# Patient Record
Sex: FEMALE | Race: WHITE | NOT HISPANIC OR LATINO | Employment: FULL TIME | URBAN - METROPOLITAN AREA
[De-identification: names, ages, dates, MRNs, and addresses within clinical notes are randomized per-mention and may not be internally consistent; named-entity substitution may affect disease eponyms.]

---

## 2017-06-19 ENCOUNTER — APPOINTMENT (OUTPATIENT)
Dept: LAB | Facility: CLINIC | Age: 52
End: 2017-06-19
Payer: COMMERCIAL

## 2017-06-19 ENCOUNTER — TRANSCRIBE ORDERS (OUTPATIENT)
Dept: LAB | Facility: CLINIC | Age: 52
End: 2017-06-19

## 2017-06-19 DIAGNOSIS — E03.9 UNSPECIFIED HYPOTHYROIDISM: Primary | ICD-10-CM

## 2017-06-19 DIAGNOSIS — E03.9 UNSPECIFIED HYPOTHYROIDISM: ICD-10-CM

## 2017-06-19 LAB
T4 FREE SERPL-MCNC: 0.92 NG/DL (ref 0.76–1.46)
TSH SERPL DL<=0.05 MIU/L-ACNC: 0.49 UIU/ML (ref 0.36–3.74)

## 2017-06-19 PROCEDURE — 84439 ASSAY OF FREE THYROXINE: CPT

## 2017-06-19 PROCEDURE — 84443 ASSAY THYROID STIM HORMONE: CPT

## 2017-06-19 PROCEDURE — 36415 COLL VENOUS BLD VENIPUNCTURE: CPT

## 2018-01-22 ENCOUNTER — TRANSCRIBE ORDERS (OUTPATIENT)
Dept: RADIOLOGY | Facility: CLINIC | Age: 53
End: 2018-01-22

## 2018-01-22 ENCOUNTER — APPOINTMENT (OUTPATIENT)
Dept: RADIOLOGY | Facility: CLINIC | Age: 53
End: 2018-01-22
Payer: COMMERCIAL

## 2018-01-22 ENCOUNTER — APPOINTMENT (OUTPATIENT)
Dept: LAB | Facility: CLINIC | Age: 53
End: 2018-01-22
Payer: COMMERCIAL

## 2018-01-22 DIAGNOSIS — Z00.00 ROUTINE GENERAL MEDICAL EXAMINATION AT A HEALTH CARE FACILITY: ICD-10-CM

## 2018-01-22 DIAGNOSIS — R06.9 ABNORMAL RESPIRATORY RATE: Primary | ICD-10-CM

## 2018-01-22 DIAGNOSIS — E03.9 MYXEDEMA HEART DISEASE: ICD-10-CM

## 2018-01-22 DIAGNOSIS — R06.9 ABNORMAL RESPIRATORY RATE: ICD-10-CM

## 2018-01-22 DIAGNOSIS — E78.2 MIXED HYPERLIPIDEMIA: ICD-10-CM

## 2018-01-22 DIAGNOSIS — I51.9 MYXEDEMA HEART DISEASE: ICD-10-CM

## 2018-01-22 LAB
ALBUMIN SERPL BCP-MCNC: 3.6 G/DL (ref 3.5–5)
ALP SERPL-CCNC: 88 U/L (ref 46–116)
ALT SERPL W P-5'-P-CCNC: 32 U/L (ref 12–78)
ANION GAP SERPL CALCULATED.3IONS-SCNC: 7 MMOL/L (ref 4–13)
AST SERPL W P-5'-P-CCNC: 19 U/L (ref 5–45)
BASOPHILS # BLD AUTO: 0.03 THOUSANDS/ΜL (ref 0–0.1)
BASOPHILS NFR BLD AUTO: 0 % (ref 0–1)
BILIRUB SERPL-MCNC: 0.4 MG/DL (ref 0.2–1)
BUN SERPL-MCNC: 19 MG/DL (ref 5–25)
CALCIUM SERPL-MCNC: 9.2 MG/DL (ref 8.3–10.1)
CHLORIDE SERPL-SCNC: 107 MMOL/L (ref 100–108)
CHOLEST SERPL-MCNC: 205 MG/DL (ref 50–200)
CO2 SERPL-SCNC: 29 MMOL/L (ref 21–32)
CREAT SERPL-MCNC: 0.87 MG/DL (ref 0.6–1.3)
EOSINOPHIL # BLD AUTO: 0.36 THOUSAND/ΜL (ref 0–0.61)
EOSINOPHIL NFR BLD AUTO: 5 % (ref 0–6)
ERYTHROCYTE [DISTWIDTH] IN BLOOD BY AUTOMATED COUNT: 14.1 % (ref 11.6–15.1)
GFR SERPL CREATININE-BSD FRML MDRD: 77 ML/MIN/1.73SQ M
GLUCOSE P FAST SERPL-MCNC: 95 MG/DL (ref 65–99)
HCT VFR BLD AUTO: 38.9 % (ref 34.8–46.1)
HDLC SERPL-MCNC: 69 MG/DL (ref 40–60)
HGB BLD-MCNC: 12.8 G/DL (ref 11.5–15.4)
LDLC SERPL CALC-MCNC: 127 MG/DL (ref 0–100)
LYMPHOCYTES # BLD AUTO: 1.88 THOUSANDS/ΜL (ref 0.6–4.47)
LYMPHOCYTES NFR BLD AUTO: 27 % (ref 14–44)
MCH RBC QN AUTO: 30.5 PG (ref 26.8–34.3)
MCHC RBC AUTO-ENTMCNC: 32.9 G/DL (ref 31.4–37.4)
MCV RBC AUTO: 93 FL (ref 82–98)
MONOCYTES # BLD AUTO: 0.58 THOUSAND/ΜL (ref 0.17–1.22)
MONOCYTES NFR BLD AUTO: 8 % (ref 4–12)
NEUTROPHILS # BLD AUTO: 4.07 THOUSANDS/ΜL (ref 1.85–7.62)
NEUTS SEG NFR BLD AUTO: 60 % (ref 43–75)
PLATELET # BLD AUTO: 256 THOUSANDS/UL (ref 149–390)
PMV BLD AUTO: 9.3 FL (ref 8.9–12.7)
POTASSIUM SERPL-SCNC: 4.4 MMOL/L (ref 3.5–5.3)
PROT SERPL-MCNC: 7.2 G/DL (ref 6.4–8.2)
RBC # BLD AUTO: 4.19 MILLION/UL (ref 3.81–5.12)
SODIUM SERPL-SCNC: 143 MMOL/L (ref 136–145)
T4 SERPL-MCNC: 9.3 UG/DL (ref 4.7–13.3)
TRIGL SERPL-MCNC: 45 MG/DL
TSH SERPL DL<=0.05 MIU/L-ACNC: 0.46 UIU/ML (ref 0.36–3.74)
WBC # BLD AUTO: 6.92 THOUSAND/UL (ref 4.31–10.16)

## 2018-01-22 PROCEDURE — 80061 LIPID PANEL: CPT

## 2018-01-22 PROCEDURE — 84436 ASSAY OF TOTAL THYROXINE: CPT

## 2018-01-22 PROCEDURE — 80053 COMPREHEN METABOLIC PANEL: CPT

## 2018-01-22 PROCEDURE — 85025 COMPLETE CBC W/AUTO DIFF WBC: CPT

## 2018-01-22 PROCEDURE — 71046 X-RAY EXAM CHEST 2 VIEWS: CPT

## 2018-01-22 PROCEDURE — 84443 ASSAY THYROID STIM HORMONE: CPT

## 2018-01-22 PROCEDURE — 36415 COLL VENOUS BLD VENIPUNCTURE: CPT

## 2018-02-27 ENCOUNTER — HOSPITAL ENCOUNTER (INPATIENT)
Facility: HOSPITAL | Age: 53
LOS: 4 days | Discharge: HOME | DRG: 394 | End: 2018-03-03
Attending: EMERGENCY MEDICINE | Admitting: FAMILY MEDICINE
Payer: COMMERCIAL

## 2018-02-27 ENCOUNTER — APPOINTMENT (EMERGENCY)
Dept: CT IMAGING | Facility: HOSPITAL | Age: 53
DRG: 394 | End: 2018-02-27
Payer: COMMERCIAL

## 2018-02-27 DIAGNOSIS — K52.9 COLITIS: Primary | ICD-10-CM

## 2018-02-27 DIAGNOSIS — D72.829 LEUKOCYTOSIS: ICD-10-CM

## 2018-02-27 PROBLEM — Z86.73 HISTORY OF TIAS: Status: ACTIVE | Noted: 2018-02-27

## 2018-02-27 LAB
ANION GAP SERPL CALCULATED.3IONS-SCNC: 10 MMOL/L (ref 4–13)
APTT PPP: 29 SECONDS (ref 23–35)
BASOPHILS # BLD AUTO: 0.02 THOUSANDS/ΜL (ref 0–0.1)
BASOPHILS NFR BLD AUTO: 0 % (ref 0–1)
BUN SERPL-MCNC: 22 MG/DL (ref 5–25)
CALCIUM SERPL-MCNC: 9 MG/DL (ref 8.3–10.1)
CHLORIDE SERPL-SCNC: 107 MMOL/L (ref 100–108)
CO2 SERPL-SCNC: 26 MMOL/L (ref 21–32)
CREAT SERPL-MCNC: 0.85 MG/DL (ref 0.6–1.3)
EOSINOPHIL # BLD AUTO: 0.07 THOUSAND/ΜL (ref 0–0.61)
EOSINOPHIL NFR BLD AUTO: 1 % (ref 0–6)
ERYTHROCYTE [DISTWIDTH] IN BLOOD BY AUTOMATED COUNT: 14.4 % (ref 11.6–15.1)
GFR SERPL CREATININE-BSD FRML MDRD: 79 ML/MIN/1.73SQ M
GLUCOSE SERPL-MCNC: 110 MG/DL (ref 65–140)
HCT VFR BLD AUTO: 40.6 % (ref 34.8–46.1)
HGB BLD-MCNC: 13.4 G/DL (ref 11.5–15.4)
INR PPP: 0.84 (ref 0.86–1.16)
LYMPHOCYTES # BLD AUTO: 1.32 THOUSANDS/ΜL (ref 0.6–4.47)
LYMPHOCYTES NFR BLD AUTO: 10 % (ref 14–44)
MCH RBC QN AUTO: 30.5 PG (ref 26.8–34.3)
MCHC RBC AUTO-ENTMCNC: 33 G/DL (ref 31.4–37.4)
MCV RBC AUTO: 92 FL (ref 82–98)
MONOCYTES # BLD AUTO: 0.56 THOUSAND/ΜL (ref 0.17–1.22)
MONOCYTES NFR BLD AUTO: 4 % (ref 4–12)
NEUTROPHILS # BLD AUTO: 11.95 THOUSANDS/ΜL (ref 1.85–7.62)
NEUTS SEG NFR BLD AUTO: 85 % (ref 43–75)
PLATELET # BLD AUTO: 281 THOUSANDS/UL (ref 149–390)
PMV BLD AUTO: 9.3 FL (ref 8.9–12.7)
POTASSIUM SERPL-SCNC: 4.3 MMOL/L (ref 3.5–5.3)
PROTHROMBIN TIME: 11.8 SECONDS (ref 12.1–14.4)
RBC # BLD AUTO: 4.4 MILLION/UL (ref 3.81–5.12)
SODIUM SERPL-SCNC: 143 MMOL/L (ref 136–145)
WBC # BLD AUTO: 13.92 THOUSAND/UL (ref 4.31–10.16)

## 2018-02-27 PROCEDURE — 99222 1ST HOSP IP/OBS MODERATE 55: CPT | Performed by: PHYSICIAN ASSISTANT

## 2018-02-27 PROCEDURE — 85610 PROTHROMBIN TIME: CPT | Performed by: EMERGENCY MEDICINE

## 2018-02-27 PROCEDURE — 96360 HYDRATION IV INFUSION INIT: CPT

## 2018-02-27 PROCEDURE — 36415 COLL VENOUS BLD VENIPUNCTURE: CPT | Performed by: EMERGENCY MEDICINE

## 2018-02-27 PROCEDURE — 80048 BASIC METABOLIC PNL TOTAL CA: CPT | Performed by: EMERGENCY MEDICINE

## 2018-02-27 PROCEDURE — 99285 EMERGENCY DEPT VISIT HI MDM: CPT

## 2018-02-27 PROCEDURE — 85730 THROMBOPLASTIN TIME PARTIAL: CPT | Performed by: EMERGENCY MEDICINE

## 2018-02-27 PROCEDURE — 74177 CT ABD & PELVIS W/CONTRAST: CPT

## 2018-02-27 PROCEDURE — 85025 COMPLETE CBC W/AUTO DIFF WBC: CPT | Performed by: EMERGENCY MEDICINE

## 2018-02-27 RX ORDER — MORPHINE SULFATE 4 MG/ML
4 INJECTION, SOLUTION INTRAMUSCULAR; INTRAVENOUS EVERY 4 HOURS PRN
Status: DISCONTINUED | OUTPATIENT
Start: 2018-02-27 | End: 2018-03-03 | Stop reason: HOSPADM

## 2018-02-27 RX ORDER — TRAMADOL HYDROCHLORIDE 50 MG/1
50 TABLET ORAL EVERY 6 HOURS PRN
Status: DISCONTINUED | OUTPATIENT
Start: 2018-02-27 | End: 2018-03-03 | Stop reason: HOSPADM

## 2018-02-27 RX ORDER — ASPIRIN 81 MG/1
81 TABLET, CHEWABLE ORAL DAILY
Status: DISCONTINUED | OUTPATIENT
Start: 2018-02-28 | End: 2018-03-03 | Stop reason: HOSPADM

## 2018-02-27 RX ORDER — ACETAMINOPHEN 325 MG/1
650 TABLET ORAL EVERY 6 HOURS PRN
Status: DISCONTINUED | OUTPATIENT
Start: 2018-02-27 | End: 2018-03-03 | Stop reason: HOSPADM

## 2018-02-27 RX ORDER — MAGNESIUM HYDROXIDE/ALUMINUM HYDROXICE/SIMETHICONE 120; 1200; 1200 MG/30ML; MG/30ML; MG/30ML
30 SUSPENSION ORAL EVERY 6 HOURS PRN
Status: DISCONTINUED | OUTPATIENT
Start: 2018-02-27 | End: 2018-03-03 | Stop reason: HOSPADM

## 2018-02-27 RX ORDER — ONDANSETRON 2 MG/ML
4 INJECTION INTRAMUSCULAR; INTRAVENOUS EVERY 6 HOURS PRN
Status: DISCONTINUED | OUTPATIENT
Start: 2018-02-27 | End: 2018-03-03 | Stop reason: HOSPADM

## 2018-02-27 RX ORDER — SODIUM CHLORIDE 9 MG/ML
100 INJECTION, SOLUTION INTRAVENOUS CONTINUOUS
Status: DISCONTINUED | OUTPATIENT
Start: 2018-02-27 | End: 2018-03-03 | Stop reason: HOSPADM

## 2018-02-27 RX ORDER — CIPROFLOXACIN 2 MG/ML
400 INJECTION, SOLUTION INTRAVENOUS EVERY 12 HOURS
Status: DISCONTINUED | OUTPATIENT
Start: 2018-02-27 | End: 2018-03-03 | Stop reason: HOSPADM

## 2018-02-27 RX ADMIN — METRONIDAZOLE 500 MG: 500 INJECTION, SOLUTION INTRAVENOUS at 22:08

## 2018-02-27 RX ADMIN — IOHEXOL 100 ML: 350 INJECTION, SOLUTION INTRAVENOUS at 19:06

## 2018-02-27 RX ADMIN — SODIUM CHLORIDE 1000 ML: 0.9 INJECTION, SOLUTION INTRAVENOUS at 18:03

## 2018-02-27 RX ADMIN — SODIUM CHLORIDE 100 ML/HR: 0.9 INJECTION, SOLUTION INTRAVENOUS at 22:08

## 2018-02-27 RX ADMIN — CIPROFLOXACIN 400 MG: 2 INJECTION, SOLUTION INTRAVENOUS at 22:45

## 2018-02-27 NOTE — ED PROVIDER NOTES
History  Chief Complaint   Patient presents with    Rectal Bleeding     Pt c/o "bright red rectal bleeding starting around 1300 today" Pt has hx of hemmrhoids, states "It's not that, I think it might be my diverticulitis"      Patient presents to the emergency department for evaluation of lower abdominal pain which began earlier today with multiple episodes of rectal bleeding  Patient has a history of diverticulitis and colitis and she states this feels like that  She denies urinary symptoms  She denies upper abdominal pain  She denies back pain  She denies fever chills nausea vomiting diarrhea  Denies trauma fall injury or new activity  There are no ill contacts at home  She denies recent antibiotic use  None       Past Medical History:   Diagnosis Date    Diverticulitis        Past Surgical History:   Procedure Laterality Date    CHOLECYSTECTOMY      COLONOSCOPY         History reviewed  No pertinent family history  I have reviewed and agree with the history as documented  Social History   Substance Use Topics    Smoking status: Former Smoker    Smokeless tobacco: Not on file    Alcohol use No        Review of Systems   Constitutional: Negative  Negative for activity change, appetite change, chills, diaphoresis, fatigue and fever  HENT: Negative  Eyes: Negative  Negative for photophobia and visual disturbance  Respiratory: Negative  Negative for cough, choking, chest tightness and shortness of breath  Cardiovascular: Negative  Negative for chest pain, palpitations and leg swelling  Gastrointestinal: Positive for abdominal pain, anal bleeding and blood in stool  Negative for constipation, diarrhea, nausea, rectal pain and vomiting  Endocrine: Negative  Genitourinary: Negative  Musculoskeletal: Negative  Negative for back pain, neck pain and neck stiffness  Skin: Negative  Negative for rash and wound  Allergic/Immunologic: Negative      Neurological: Negative  Negative for dizziness, tremors, seizures, syncope, facial asymmetry, speech difficulty, weakness, light-headedness, numbness and headaches  Hematological: Negative  Does not bruise/bleed easily  Psychiatric/Behavioral: Negative  Physical Exam  ED Triage Vitals [02/27/18 1733]   Temperature Pulse Respirations Blood Pressure SpO2   98 °F (36 7 °C) 105 16 145/76 98 %      Temp Source Heart Rate Source Patient Position - Orthostatic VS BP Location FiO2 (%)   Oral Monitor Lying Right arm --      Pain Score       8           Orthostatic Vital Signs  Vitals:    02/27/18 1733 02/27/18 1745 02/27/18 1956   BP: 145/76 145/76 137/68   Pulse: 105 92 94   Patient Position - Orthostatic VS: Lying  Lying       Physical Exam   Constitutional: She is oriented to person, place, and time  She appears well-developed and well-nourished  Nontoxic appearance without respiratory distress  Patient looks comfortable sitting upright on the stretcher  HENT:   Head: Normocephalic and atraumatic  Right Ear: External ear normal    Left Ear: External ear normal    Mouth/Throat: Oropharynx is clear and moist    Eyes: Conjunctivae and EOM are normal  Pupils are equal, round, and reactive to light  Neck: Normal range of motion  Neck supple  Cardiovascular: Normal rate, regular rhythm, normal heart sounds and intact distal pulses  Pulmonary/Chest: Effort normal and breath sounds normal  No respiratory distress  She has no wheezes  She has no rales  She exhibits no tenderness  Abdominal: Soft  Bowel sounds are normal  She exhibits no distension and no mass  There is tenderness  There is no rebound and no guarding  No hernia  Mild lower abdominal tenderness to palpation  No peritoneal signs   Musculoskeletal: Normal range of motion  She exhibits no edema, tenderness or deformity  Neurological: She is alert and oriented to person, place, and time  She has normal reflexes  She displays normal reflexes   No cranial nerve deficit or sensory deficit  She exhibits normal muscle tone  Coordination normal    Skin: Skin is warm and dry  Psychiatric: She has a normal mood and affect  Her behavior is normal  Judgment and thought content normal    Nursing note and vitals reviewed  ED Medications  Medications   sodium chloride 0 9 % bolus 1,000 mL (0 mL Intravenous Stopped 2/27/18 1914)   iohexol (OMNIPAQUE) 350 MG/ML injection (MULTI-DOSE) 100 mL (100 mL Intravenous Given 2/27/18 1906)       Diagnostic Studies  Results Reviewed     Procedure Component Value Units Date/Time    Protime-INR [[id number]]  (Abnormal) Collected:  02/27/18 1802    Lab Status:  Final result Specimen:  Blood from Arm, Left Updated:  02/27/18 1825     Protime 11 8 (L) seconds      INR 0 84 (L)    APTT [[id number]]  (Normal) Collected:  02/27/18 1802    Lab Status:  Final result Specimen:  Blood from Arm, Left Updated:  02/27/18 1825     PTT 29 seconds     Narrative: Therapeutic Heparin Range = 60-90 seconds    Basic metabolic panel [[id number]] Collected:  02/27/18 1802    Lab Status:  Final result Specimen:  Blood from Arm, Left Updated:  02/27/18 1823     Sodium 143 mmol/L      Potassium 4 3 mmol/L      Chloride 107 mmol/L      CO2 26 mmol/L      Anion Gap 10 mmol/L      BUN 22 mg/dL      Creatinine 0 85 mg/dL      Glucose 110 mg/dL      Calcium 9 0 mg/dL      eGFR 79 ml/min/1 73sq m     Narrative:         National Kidney Disease Education Program recommendations are as follows:  GFR calculation is accurate only with a steady state creatinine  Chronic Kidney disease less than 60 ml/min/1 73 sq  meters  Kidney failure less than 15 ml/min/1 73 sq  meters      CBC and differential [[id number]]  (Abnormal) Collected:  02/27/18 1802    Lab Status:  Final result Specimen:  Blood from Arm, Left Updated:  02/27/18 1808     WBC 13 92 (H) Thousand/uL      RBC 4 40 Million/uL      Hemoglobin 13 4 g/dL      Hematocrit 40 6 %      MCV 92 fL      MCH 30 5 pg MCHC 33 0 g/dL      RDW 14 4 %      MPV 9 3 fL      Platelets 396 Thousands/uL      Neutrophils Relative 85 (H) %      Lymphocytes Relative 10 (L) %      Monocytes Relative 4 %      Eosinophils Relative 1 %      Basophils Relative 0 %      Neutrophils Absolute 11 95 (H) Thousands/µL      Lymphocytes Absolute 1 32 Thousands/µL      Monocytes Absolute 0 56 Thousand/µL      Eosinophils Absolute 0 07 Thousand/µL      Basophils Absolute 0 02 Thousands/µL                  CT abdomen pelvis with contrast   Final Result by Yusef Shin MD (02/27 1927)      Colitis of the descending colon  Workstation performed: [id number]                    Procedures  Procedures       Phone Contacts  ED Phone Contact    ED Course  ED Course as of Feb 27 2020   Tue Feb 27, 2018 1959 Patient will be admitted to the hospitalist service  Her CT is consistent with descending colitis  Patient is refusing analgesics at this time  MDM  CritCare Time    Disposition  Final diagnoses:   Colitis   Leukocytosis     Time reflects when diagnosis was documented in both MDM as applicable and the Disposition within this note     Time User Action Codes Description Comment    2/27/2018  8:00 PM Vena Corona Add [K52 9] Colitis     2/27/2018  8:00 PM Tom Cross 57 [D72 829] Leukocytosis       ED Disposition     ED Disposition Condition Comment    Admit  Case was discussed with Dr Yaya Toribio  and the patient's admission status was agreed to be Admission Status: inpatient status to the service of Dr Emanuel Rodriguez    None       Patient's Medications    No medications on file     No discharge procedures on file      ED Provider  Electronically Signed by           Jermain Orellana MD  02/27/18 8346

## 2018-02-28 LAB
ANION GAP SERPL CALCULATED.3IONS-SCNC: 8 MMOL/L (ref 4–13)
BUN SERPL-MCNC: 13 MG/DL (ref 5–25)
CALCIUM SERPL-MCNC: 8.5 MG/DL (ref 8.3–10.1)
CHLORIDE SERPL-SCNC: 106 MMOL/L (ref 100–108)
CO2 SERPL-SCNC: 25 MMOL/L (ref 21–32)
CREAT SERPL-MCNC: 0.73 MG/DL (ref 0.6–1.3)
ERYTHROCYTE [DISTWIDTH] IN BLOOD BY AUTOMATED COUNT: 14.3 % (ref 11.6–15.1)
GFR SERPL CREATININE-BSD FRML MDRD: 95 ML/MIN/1.73SQ M
GLUCOSE SERPL-MCNC: 98 MG/DL (ref 65–140)
HCT VFR BLD AUTO: 36.4 % (ref 34.8–46.1)
HGB BLD-MCNC: 11.9 G/DL (ref 11.5–15.4)
LACTATE SERPL-SCNC: 0.4 MMOL/L (ref 0.5–2)
MCH RBC QN AUTO: 30.2 PG (ref 26.8–34.3)
MCHC RBC AUTO-ENTMCNC: 32.7 G/DL (ref 31.4–37.4)
MCV RBC AUTO: 92 FL (ref 82–98)
PLATELET # BLD AUTO: 248 THOUSANDS/UL (ref 149–390)
PMV BLD AUTO: 9.7 FL (ref 8.9–12.7)
POTASSIUM SERPL-SCNC: 3.5 MMOL/L (ref 3.5–5.3)
RBC # BLD AUTO: 3.94 MILLION/UL (ref 3.81–5.12)
SODIUM SERPL-SCNC: 139 MMOL/L (ref 136–145)
WBC # BLD AUTO: 10.92 THOUSAND/UL (ref 4.31–10.16)

## 2018-02-28 PROCEDURE — 80048 BASIC METABOLIC PNL TOTAL CA: CPT | Performed by: PHYSICIAN ASSISTANT

## 2018-02-28 PROCEDURE — 83605 ASSAY OF LACTIC ACID: CPT | Performed by: PHYSICIAN ASSISTANT

## 2018-02-28 PROCEDURE — 85027 COMPLETE CBC AUTOMATED: CPT | Performed by: PHYSICIAN ASSISTANT

## 2018-02-28 PROCEDURE — 99232 SBSQ HOSP IP/OBS MODERATE 35: CPT | Performed by: HOSPITALIST

## 2018-02-28 PROCEDURE — 99254 IP/OBS CNSLTJ NEW/EST MOD 60: CPT | Performed by: INTERNAL MEDICINE

## 2018-02-28 RX ADMIN — CIPROFLOXACIN 400 MG: 2 INJECTION, SOLUTION INTRAVENOUS at 09:16

## 2018-02-28 RX ADMIN — TRAMADOL HYDROCHLORIDE 50 MG: 50 TABLET, FILM COATED ORAL at 05:19

## 2018-02-28 RX ADMIN — METRONIDAZOLE 500 MG: 500 INJECTION, SOLUTION INTRAVENOUS at 22:15

## 2018-02-28 RX ADMIN — CIPROFLOXACIN 400 MG: 2 INJECTION, SOLUTION INTRAVENOUS at 21:00

## 2018-02-28 RX ADMIN — SODIUM CHLORIDE 100 ML/HR: 0.9 INJECTION, SOLUTION INTRAVENOUS at 11:40

## 2018-02-28 RX ADMIN — TRAMADOL HYDROCHLORIDE 50 MG: 50 TABLET, FILM COATED ORAL at 11:24

## 2018-02-28 RX ADMIN — METRONIDAZOLE 500 MG: 500 INJECTION, SOLUTION INTRAVENOUS at 14:37

## 2018-02-28 RX ADMIN — METRONIDAZOLE 500 MG: 500 INJECTION, SOLUTION INTRAVENOUS at 05:19

## 2018-02-28 RX ADMIN — ASPIRIN 81 MG 81 MG: 81 TABLET ORAL at 09:15

## 2018-02-28 NOTE — CONSULTS
Consultation - [facility]  Erum Shoemaker 47 y o  female MRN: [id number]  Unit/Bed#: -01 Encounter: [id number]    Inpatient consult to gastroenterology  Consult performed by: Corey Sosa ordered by: Beltran Sal        Reason for Consult / Principal Problem: Colitis    ASSESSMENT and PLAN:    Active Problems:    Colitis    History of TIAs    Descending Colitis with abdominal pain and bright red bleeding  -Likely ischemic, less likely infectious v diverticular  -History of ischemic colitis, confirmed by biopsy in 2014  Negative historical workup with doppler U/S, and negative workup for clotting disorders   -Last colonoscopy 3/2015 with sigmoid diverticulosis, poor prep, and tubular adenoma in the cecum    Plan:   -Continue supportive care, pain management per primary  -Continue to monitor H/H qdaily  -Will advance to clear liquid diet at this time  -Will r/o infectious etiology with stool studies  -If does not improve during admission would consider colonoscopy, if improvement would recommend outpatient colonoscopy for evaluation of resolution and because she is due secondary to polyp history  -------------------------------------------------------------------------------------------------------------------  HPI:   Alicia Peters is a 46 y/o female with a PMH of diverticulitis and remote history of TIAs who presents with lower abdominal pain  The symptoms started abruptly after lunch 2/27 with severe abdominal pain in the LLQ  She then had a formed brown bm and following started with loose bms with blood, she has since been passing bloody mucus every ~30 minutes  Her pain persists in the LLQ and is constant  She did develop fever/chills and nausea but did not have vomiting  She denies any new medications (only on ASA 81mg), eating any strange food, no recent travel  No sick contacts  She did have the flu about 3 weeks ago   Typically at baseline she is constipated going every 2-3 days  She has a history of ischemic colitis and questionable history of diverticulitis  Her last episode was in 2015  She was seen in the past by Dr Kayleigh Hart  She had a colonoscopy 3/2015 with a poor prep and diverticulosis in the sigmoid colon as well as a polyp in the cecum that was a tubular adenoma with repeat colonoscopy recommended in 2016  She also had a colonoscopy 11/2014 with biopsies consistent with severe ischemic colitis  She had an EGD 3/2015 was unremarkable with biopsies showing mild chronic gastritis  She had a ultrasound with doppler in 2014/ 11/2015 with patent vasculature  She has had prior negative workup with clotting disorders such as factor V leiden  CT A/P this admission shows descending colitis without abscess and scattered sigmoid diverticula  her hemoglobin 11 9 2/28 13 4 2/27  Lactic acid unremarkable  She presented with mild leukocytosis at 13  She was started on cipro and flagyl  REVIEW OF SYSTEMS:  CONSTITUTIONAL: + fever/chill  Denies rigors  Good appetite, and no recent weight loss  HEENT: No earache or tinnitus  Denies hearing loss or visual disturbances  CARDIOVASCULAR: No chest pain or palpitations  RESPIRATORY: Denies any cough, hemoptysis, shortness of breath or dyspnea on exertion  GASTROINTESTINAL: As noted in the History of Present Illness  GENITOURINARY: No problems with urination  Denies any hematuria or dysuria  NEUROLOGIC: No dizziness or vertigo, denies headaches  MUSCULOSKELETAL: Denies any muscle or joint pain  SKIN: Denies skin rashes or itching  ENDOCRINE: Denies excessive thirst  Denies intolerance to heat or cold  PSYCHOSOCIAL: Denies depression or anxiety  Denies any recent memory loss       Historical Information   Past Medical History:   Diagnosis Date    Diverticulitis      Past Surgical History:   Procedure Laterality Date    CHOLECYSTECTOMY      COLONOSCOPY       Social History   History   Alcohol Use No History   Drug Use No     History   Smoking Status    Former Smoker   Smokeless Tobacco    Not on file     History reviewed  No pertinent family history  Meds/Allergies     Prescriptions Prior to Admission   Medication    ASPIRIN 81 PO     Current Facility-Administered Medications   Medication Dose Route Frequency    acetaminophen (TYLENOL) tablet 650 mg  650 mg Oral Q6H PRN    aluminum-magnesium hydroxide-simethicone (MYLANTA) 200-200-20 mg/5 mL oral suspension 30 mL  30 mL Oral Q6H PRN    aspirin chewable tablet 81 mg  81 mg Oral Daily    ciprofloxacin (CIPRO) IVPB (premix) 400 mg  400 mg Intravenous Q12H    metroNIDAZOLE (FLAGYL) IVPB (premix) 500 mg  500 mg Intravenous Q8H    morphine (PF) 4 mg/mL injection 4 mg  4 mg Intravenous Q4H PRN    ondansetron (ZOFRAN) injection 4 mg  4 mg Intravenous Q6H PRN    sodium chloride 0 9 % infusion  100 mL/hr Intravenous Continuous    traMADol (ULTRAM) tablet 50 mg  50 mg Oral Q6H PRN     No Known Allergies    Objective     Blood pressure 113/63, pulse 95, temperature 98 6 °F (37 °C), temperature source Oral, resp  rate 16, weight 93 3 kg (205 lb 11 oz), SpO2 92 %  Intake/Output Summary (Last 24 hours) at 02/28/18 0820  Last data filed at 02/27/18 1914   Gross per 24 hour   Intake             1000 ml   Output                0 ml   Net             1000 ml     PHYSICAL EXAM:    General Appearance:   Alert, cooperative, no distress, appears stated age    HEENT:   Normocephalic, atraumatic, anicteric, no oropharyngeal thrush present      Neck:  Supple, symmetrical, trachea midline, no adenopathy;    thyroid: no enlargement/tenderness/nodules; no carotid  bruit or JVD    Lungs:   Clear to auscultation bilaterally; no rales, rhonchi or wheezing; respirations unlabored    Heart[de-identified]   S1 and S2 normal; regular rate and rhythm; no murmur, rub, or gallop     Abdomen:   Soft, non-distended; normal bowel sounds; Tenderness in the LLQ without rigidity or peritoneal signs  No masses, no organomegaly    Genitalia:   Deferred    Rectal:   Deferred    Extremities:  No cyanosis, clubbing or edema    Pulses:  2+ and symmetric all extremities    Skin:  Skin color, texture, turgor normal, no rashes or lesions    Lymph nodes:  No palpable cervical, axillary or inguinal lymphadenopathy        Lab Results:     Results from last 7 days  Lab Units 02/28/18  0708 02/27/18  1802   WBC Thousand/uL 10 92* 13 92*   HEMOGLOBIN g/dL 11 9 13 4   HEMATOCRIT % 36 4 40 6   PLATELETS Thousands/uL 248 281   NEUTROS PCT %  --  85*   LYMPHS PCT %  --  10*   MONOS PCT %  --  4   EOS PCT %  --  1       Results from last 7 days  Lab Units 02/27/18  1802   SODIUM mmol/L 143   POTASSIUM mmol/L 4 3   CHLORIDE mmol/L 107   CO2 mmol/L 26   BUN mg/dL 22   CREATININE mg/dL 0 85   CALCIUM mg/dL 9 0   GLUCOSE RANDOM mg/dL 110       Results from last 7 days  Lab Units 02/27/18  1802   INR  0 84*     Imaging Studies: I have personally reviewed pertinent imaging studies  Ct Abdomen Pelvis With Contrast    Result Date: 2/27/2018  Impression: Colitis of the descending colon  Workstation performed: [id number]     Patient was seen and examined by Dr Amber Baker  All [facility] medical decisions were made by Dr Amber Baker  Thank you for allowing us to participate in the care of this present patient  We will follow-up with you closely

## 2018-02-28 NOTE — PROGRESS NOTES
Progress Note - Chayo Sharpe 1965, 47 y o  female MRN: [id number]    Unit/Bed#: -01 Encounter: [id number]    Primary Care Provider: Gerard King MD   Date and time admitted to hospital: 2018  5:27 PM      * Colitis   Assessment & Plan    CT with colitis of descending colon, white count 13 9, mild tachycardia  Other VSS  Hx of diverticulitis per review of [facility] records; ischemic colitis per pt   Cont cipro and flagyl   Analgesics as needed  Consult GI; appreciate input   Consider CTA if increased pain/rectal bleeding  History of TIAs   Assessment & Plan    Noted; cont ASA            VTE Pharmacologic Prophylaxis:   Pharmacologic: held for rectal bleeding   Mechanical VTE Prophylaxis in Place: Yes    Patient Centered Rounds: I have performed bedside rounds with nursing staff today  Discussions with Specialists or Other Care Team Provider: GI     Education and Discussions with Family / Patient: patient     Time Spent for Care: 20 minutes  More than 50% of total time spent on counseling and coordination of care as described above  Current Length of Stay: 1 day(s)    Current Patient Status: Inpatient   Certification Statement: The patient will continue to require additional inpatient hospital stay due to colitis on antibotics     Discharge Plan / Estimated Discharge Date: TBD based on clinical course    Code Status: Level 1 - Full Code    Subjective:   Pt's pain is slightly worse but not similar to prior case of ischemic colitis  No bowel movements  Objective:     Vitals:   Temp (24hrs), Av 2 °F (36 8 °C), Min:98 °F (36 7 °C), Max:98 6 °F (37 °C)    HR:  [] 95  Resp:  [16-18] 16  BP: (113-145)/(60-76) 113/63  SpO2:  [92 %-98 %] 92 %  Body mass index is 35 31 kg/m²  Input and Output Summary (last 24 hours):        Intake/Output Summary (Last 24 hours) at 18 0930  Last data filed at 18 1914   Gross per 24 hour   Intake             1000 ml   Output 0 ml   Net             1000 ml       Physical Exam:     Physical Exam   Constitutional: She is oriented to person, place, and time  She appears well-developed  No distress  Cardiovascular: Normal rate and regular rhythm  Pulmonary/Chest: Effort normal and breath sounds normal  No respiratory distress  She has no wheezes  Abdominal: Soft  Bowel sounds are normal  She exhibits no distension and no mass  There is tenderness (mild diffuse tenderness )  There is no rebound and no guarding  Musculoskeletal: She exhibits no edema  Neurological: She is alert and oriented to person, place, and time  Skin: Skin is warm and dry  She is not diaphoretic  Psychiatric: She has a normal mood and affect  Her behavior is normal    Nursing note and vitals reviewed  Additional Data:     Labs:      Results from last 7 days  Lab Units 02/28/18  0708 02/27/18  1802   WBC Thousand/uL 10 92* 13 92*   HEMOGLOBIN g/dL 11 9 13 4   HEMATOCRIT % 36 4 40 6   PLATELETS Thousands/uL 248 281   NEUTROS PCT %  --  85*   LYMPHS PCT %  --  10*   MONOS PCT %  --  4   EOS PCT %  --  1       Results from last 7 days  Lab Units 02/28/18  0708   SODIUM mmol/L 139   POTASSIUM mmol/L 3 5   CHLORIDE mmol/L 106   CO2 mmol/L 25   BUN mg/dL 13   CREATININE mg/dL 0 73   CALCIUM mg/dL 8 5   GLUCOSE RANDOM mg/dL 98       Results from last 7 days  Lab Units 02/27/18  1802   INR  0 84*       * I Have Reviewed All Lab Data Listed Above  * Additional Pertinent Lab Tests Reviewed:  All Labs Within Last 24 Hours Reviewed    Imaging:    Imaging Reports Reviewed Today Include:   Imaging Personally Reviewed by Myself Includes:      Recent Cultures (last 7 days):           Last 24 Hours Medication List:     Current Facility-Administered Medications:  acetaminophen 650 mg Oral Q6H PRN Corazon Oswaldo, PA-C    aluminum-magnesium hydroxide-simethicone 30 mL Oral Q6H PRN Corazon Oswaldo, PA-C    aspirin 81 mg Oral Daily Corazon Oswaldo, PA-C ciprofloxacin 400 mg Intravenous Q12H Paulene Juan José, PA-C Last Rate: 400 mg (02/28/18 0916)   metroNIDAZOLE 500 mg Intravenous Q8H Arpita Hunt PA-C Last Rate: 500 mg (02/28/18 0519)   morphine injection 4 mg Intravenous Q4H PRN Paulene Fairhope, PA-C    ondansetron 4 mg Intravenous Q6H PRN Paulene Fairhope, PA-C    sodium chloride 100 mL/hr Intravenous Continuous Paulene Juan José, PA-C Last Rate: 100 mL/hr (02/27/18 2208)   traMADol 50 mg Oral Q6H PRN Paulene Fairhope, PA-C         Today, Patient Was Seen By: Issac Dey MD    ** Please Note: This note has been constructed using a voice recognition system   **

## 2018-02-28 NOTE — PLAN OF CARE
DISCHARGE PLANNING     Discharge to home or other facility with appropriate resources Progressing        PAIN - ADULT     Verbalizes/displays adequate comfort level or baseline comfort level Progressing        SAFETY ADULT     Patient will remain free of falls Progressing

## 2018-02-28 NOTE — ASSESSMENT & PLAN NOTE
CT with colitis of descending colon, white count 13 9, mild tachycardia  Other VSS  Hx of diverticulitis per review of [facility] records;   H/o ischemic colitis; seen by Dr Ban Farrar in the past   Cont cipro and flagyl   Analgesics as needed  Consult GI; appreciate input   Consider CTA if increased pain/rectal bleeding

## 2018-02-28 NOTE — ASSESSMENT & PLAN NOTE
1  CT with colitis of descending colon, white count 13 9, mild tachycardia  Other VSS  2  Hx of diverticulitis per review of [facility] records, ischemic colitis per pt although I could not find mention of this in records  3  Will start Cipro and Flagyl, monitor CBC  IVF  4  Analgesics as needed  Hold off on steroids until GI evaluates  5  Consult GI  6  Rectal bleeding is minimal, if any increase in hematochezia/chagne in vitals, will monitor H/H closely  Hgb stable at 13 5   7  Consider CTA if increased pain/rectal bleeding

## 2018-02-28 NOTE — ED NOTES
SLIM at the bedside     Nicole Epps, [facility]  02/27/18 8935

## 2018-02-28 NOTE — PLAN OF CARE
Problem: DISCHARGE PLANNING - CARE MANAGEMENT  Goal: Discharge to post-acute care or home with appropriate resources  INTERVENTIONS:  - Conduct assessment to determine patient/family and health care team treatment goals, and need for post-acute services based on payer coverage, community resources, and patient preferences, and barriers to discharge  - Address psychosocial, clinical, and financial barriers to discharge as identified in assessment in conjunction with the patient/family and health care team  - Arrange appropriate level of post-acute services according to patients   needs and preference and payer coverage in collaboration with the physician and health care team  - Communicate with and update the patient/family, physician, and health care team regarding progress on the discharge plan  - Arrange appropriate transportation to post-acute venues  Outcome: Progressing  Per nursing rounds this AM, Patient was admitted for Colitis  Patient is currently r/o c-dif  Patient still having pain, blood in stool, and most likely will not be ready for another 48hrs  CM department will continue to follow pt through discharge

## 2018-02-28 NOTE — SOCIAL WORK
Per nursing rounds this AM, Patient was admitted for Colitis  Patient is currently r/o c-dif  Patient still having pain, blood in stool, and most likely will not be ready for another 48hrs  CM department will continue to follow pt through discharge

## 2018-02-28 NOTE — PLAN OF CARE
Problem: PAIN - ADULT  Goal: Verbalizes/displays adequate comfort level or baseline comfort level  Interventions:  - Encourage patient to monitor pain and request assistance  - Assess pain using appropriate pain scale  - Administer analgesics based on type and severity of pain and evaluate response  - Implement non-pharmacological measures as appropriate and evaluate response  - Consider cultural and social influences on pain and pain management  - Notify physician/advanced practitioner if interventions unsuccessful or patient reports new pain   Outcome: Progressing      Problem: SAFETY ADULT  Goal: Patient will remain free of falls  INTERVENTIONS:  - Assess patient frequently for physical needs  -  Identify cognitive and physical deficits and behaviors that affect risk of falls    -  [place] fall precautions as indicated by assessment   - Educate patient/family on patient safety including physical limitations  - Instruct patient to call for assistance with activity based on assessment  - Modify environment to reduce risk of injury  - Consider OT/PT consult to assist with strengthening/mobility   Outcome: Progressing      Problem: DISCHARGE PLANNING  Goal: Discharge to home or other facility with appropriate resources  INTERVENTIONS:  - Identify barriers to discharge w/patient and caregiver  - Arrange for needed discharge resources and transportation as appropriate  - Identify discharge learning needs (meds, wound care, etc )  - Arrange for interpretive services to assist at discharge as needed  - Refer to Case Management Department for coordinating discharge planning if the patient needs post-hospital services based on physician/advanced practitioner order or complex needs related to functional status, cognitive ability, or social support system   Outcome: Progressing

## 2018-02-28 NOTE — H&P
H&P- Quique Fernandez 1965, 47 y o  female MRN: [id number]    Unit/Bed#: TAYLER Encounter: [id number]    Primary Care Provider: Adebayo Boogie MD   Date and time admitted to hospital: 2/27/2018  5:27 PM    Colitis   Assessment & Plan    1  CT with colitis of descending colon, white count 13 9, mild tachycardia  Other VSS  2  Hx of diverticulitis and ischemic colitis per pt  Will start Cipro and Flagyl, monitor CBC  IVF  3  Analgesics as needed  Hold off on steroids until GI evaluates  4  Consult GI  5  Rectal bleeding is minimal, if any increase in hematochezia/chagne in vitals, will monitor H/H closely  Hgb stable at 13 5  6  Consider CTA if increased pain/rectal bleeding  VTE Prophylaxis: hold until rectal bleeding resolves  / sequential compression device   Code Status: FULL  POLST: POLST form is not discussed and not completed at this time  Discussion with family: none    Anticipated Length of Stay:  Patient will be admitted on an Inpatient basis with an anticipated length of stay of  > 2 midnights  Justification for Hospital Stay: requiring IV abx    Total Time for Visit, including Counseling / Coordination of Care: 30 minutes  Greater than 50% of this total time spent on direct patient counseling and coordination of care  Chief Complaint:   Abdominal pain    History of Present Illness:    Quique Fernandez is a 47 y o  female  With PMHx of diverticulitis who presents with lower abdominal pain  Patient states she felt well up until 12 PM this afternoon after lunch when she developed severe abdominal pain suddenly  She says the pain was in her lower abdomen in the LLQ with radiation to the suprapubic area  She describes it as constant and progressive  She says she knew to check her stool and saw that it was in fact bloody  Her first bowel movement was loose and she says they became looser and darker and more foul smelling  She says her stools now are watery and very bloody  She says she is going to the bathroom about 4-5 times per hour with bright red blood with each BM  She describes streaks of blood as well as yellow mucousy "balls" in her stool  She also admits to fever and chills that developed shortly after her abdominal pain as well as nausea without vomiting  She says she has about 3-4 episodes of colitis per year but has only been hospitalized once before  She says the colitis of her last admission was "ten times worse"  She believes she was diagnosed with ischemic colitis on her last admission, she was seen by Dr Omar Dick  She says she also has a history of TIA's that have all been worked up for which she says they gave her no reason for why they occurred, she takes baby asa daily  Denies any other past medical history or risk factors for vascular disease  Denies taking any other medications  She is currently refusing analgesics as she states last time she saw "rats on the ceiling with their guts hanging out"  She denies CP, SOB, HA, dizziness, palpitations  Review of Systems:    Review of Systems   Constitutional: Positive for appetite change, chills and fever  Negative for activity change, diaphoresis and fatigue  Respiratory: Negative  Cardiovascular: Negative  Gastrointestinal: Positive for abdominal distention, abdominal pain, blood in stool, diarrhea and nausea  Negative for constipation and vomiting  Musculoskeletal: Negative  Skin: Negative  Neurological: Negative  Hematological: Negative  Psychiatric/Behavioral: Negative  Past Medical and Surgical History:     Past Medical History:   Diagnosis Date    Diverticulitis        Past Surgical History:   Procedure Laterality Date    CHOLECYSTECTOMY      COLONOSCOPY         Meds/Allergies:    Prior to Admission medications    Medication Sig Start Date End Date Taking?  Authorizing Provider   ASPIRIN 81 PO Take 81 mg by mouth   Yes Historical Provider, MD     I have reviewed home medications with patient personally  Allergies: No Known Allergies    Social History:     Marital Status: /Civil Union   Occupation:   Patient Pre-hospital Living Situation: home  Patient Pre-hospital Level of Mobility: independent  Patient Pre-hospital Diet Restrictions: none  Substance Use History:   History   Alcohol Use No     History   Smoking Status    Former Smoker   Smokeless Tobacco    Not on file     History   Drug Use No       Family History:    non-contributory    Physical Exam:     Vitals:   Blood Pressure: 137/68 (02/27/18 1956)  Pulse: 94 (02/27/18 1956)  Temperature: 98 °F (36 7 °C) (02/27/18 1733)  Temp Source: Oral (02/27/18 1733)  Respirations: 16 (02/27/18 1956)  Weight - Scale: 93 3 kg (205 lb 11 oz) (02/27/18 1734)  SpO2: 96 % (02/27/18 1956)    Physical Exam   Constitutional: She appears well-developed and well-nourished  She appears distressed  HENT:   Head: Normocephalic and atraumatic  Cardiovascular: Normal rate, regular rhythm, normal heart sounds and intact distal pulses  Exam reveals no gallop and no friction rub  No murmur heard  Pulmonary/Chest: Effort normal and breath sounds normal  No respiratory distress  She has no wheezes  She has no rales  She exhibits no tenderness  Abdominal: Soft  Bowel sounds are normal  She exhibits no distension and no mass  There is tenderness (suprapubic tenderness with wrost tenderness over LLQ) in the suprapubic area and left lower quadrant  There is rebound and guarding  Musculoskeletal: She exhibits no edema  Neurological: She is alert  Skin: Skin is warm and dry  No rash noted  She is not diaphoretic  No erythema  No pallor  Psychiatric: She has a normal mood and affect  Her behavior is normal    Nursing note and vitals reviewed  Additional Data:     Lab Results: I have personally reviewed pertinent reports          Results from last 7 days  Lab Units 02/27/18  1802   WBC Thousand/uL 13 92* HEMOGLOBIN g/dL 13 4   HEMATOCRIT % 40 6   PLATELETS Thousands/uL 281   NEUTROS PCT % 85*   LYMPHS PCT % 10*   MONOS PCT % 4   EOS PCT % 1       Results from last 7 days  Lab Units 02/27/18  1802   SODIUM mmol/L 143   POTASSIUM mmol/L 4 3   CHLORIDE mmol/L 107   CO2 mmol/L 26   BUN mg/dL 22   CREATININE mg/dL 0 85   CALCIUM mg/dL 9 0   GLUCOSE RANDOM mg/dL 110       Results from last 7 days  Lab Units 02/27/18  1802   INR  0 84*       Imaging: I have personally reviewed pertinent reports  CT abdomen pelvis with contrast   Final Result by Jenkins Hashimoto, MD (02/27 1927)      Colitis of the descending colon  Workstation performed: [id number]             EKG, Pathology, and Other Studies Reviewed on Admission:   · EKG: none on record    Allscripts / Epic Records Reviewed: No     ** Please Note: This note has been constructed using a voice recognition system   **

## 2018-02-28 NOTE — CASE MANAGEMENT
Initial Clinical Review    Admission: Date/Time/Statement: 2/27/18 @ 2019     Orders Placed This Encounter   Procedures    Inpatient Admission (expected length of stay for this patient is greater than two midnights)     Standing Status:   Standing     Number of Occurrences:   1     Order Specific Question:   Admitting Physician     Answer:   Fawad Stanton [[id number]]     Order Specific Question:   Level of Care     Answer:   Med Surg [[id number]]     Order Specific Question:   Estimated length of stay     Answer:   More than 2 Midnights     Order Specific Question:   Certification     Answer:   I certify that inpatient services are medically necessary for this patient for a duration of greater than two midnights  See H&P and MD Progress Notes for additional information about the patient's course of treatment  ED: Date/Time/Mode of Arrival:   ED Arrival Information     Expected Arrival Acuity Means of Arrival Escorted By Service Admission Type    - 2/27/2018 17:26 Urgent Walk-In Family Member General Medicine Urgent    Arrival Complaint    Abdominal pain, bloody stool          Chief Complaint:   Chief Complaint   Patient presents with    Rectal Bleeding     Pt c/o "bright red rectal bleeding starting around 1300 today" Pt has hx of hemmrhoids, states "It's not that, I think it might be my diverticulitis"        History of Illness:  47 y o  female  With PMHx of diverticulitis who presents with lower abdominal pain  Patient states she felt well up until 12 PM this afternoon after lunch when she developed severe abdominal pain suddenly  She says the pain was in her lower abdomen in the LLQ with radiation to the suprapubic area  She describes it as constant and progressive  She says she knew to check her stool and saw that it was in fact bloody  Her first bowel movement was loose and she says they became looser and darker and more foul smelling  She says her stools now are watery and very bloody   She says she is going to the bathroom about 4-5 times per hour with bright red blood with each BM  She describes streaks of blood as well as yellow mucousy "balls" in her stool  She also admits to fever and chills that developed shortly after her abdominal pain as well as nausea without vomiting  She says she has about 3-4 episodes of colitis per year but has only been hospitalized once before  She says the colitis of her last admission was "ten times worse"  She believes she was diagnosed with ischemic colitis on her last admission, she was seen by Dr Lm Parker  She says she also has a history of TIA's that have all been worked up for which she says they gave her no reason for why they occurred, she takes baby asa daily  Denies any other past medical history or risk factors for vascular disease  Denies taking any other medications  She is currently refusing analgesics as she states last time she saw "rats on the ceiling with their guts hanging out"  She denies CP, SOB, HA, dizziness, palpitations    ED Vital Signs:   ED Triage Vitals [02/27/18 1733]   Temperature Pulse Respirations Blood Pressure SpO2   98 °F (36 7 °C) 105 16 145/76 98 %      Temp Source Heart Rate Source Patient Position - Orthostatic VS BP Location FiO2 (%)   Oral Monitor Lying Right arm --      Pain Score       8        Wt Readings from Last 1 Encounters:   02/27/18 93 3 kg (205 lb 11 oz)       Vital Signs (abnormal): maximum pulse 105  Exam  Mild lower abdominal tenderness to palpation       Abnormal Labs/Diagnostic Test Results:   INR 0 84  Wbc 13 92    Ct abdomen - Colitis of the descending colon    Labs 2/28- lactic acid 0 4  Wbc 10 92    ED Treatment:   Medication Administration from 02/27/2018 1725 to 02/27/2018 2110       Date/Time Order Dose Route Action Action by Comments     02/27/2018 1914 sodium chloride 0 9 % bolus 1,000 mL 0 mL Intravenous Stopped Virgil Matamoros RN      02/27/2018 1803 sodium chloride 0 9 % bolus 1,000 mL 1,000 mL Intravenous New Bag Tennille Valverde, RN      02/27/2018 1906 iohexol (OMNIPAQUE) 350 MG/ML injection (MULTI-DOSE) 100 mL 100 mL Intravenous Given Boubacar Britt           Past Medical/Surgical History: Active Ambulatory Problems     Diagnosis Date Noted    No Active Ambulatory Problems     Resolved Ambulatory Problems     Diagnosis Date Noted    No Resolved Ambulatory Problems     Past Medical History:   Diagnosis Date    Diverticulitis        Admitting Diagnosis: Colitis [K52 9]  Leukocytosis [D72 829]  Rectal bleeding [K62 5]    Age/Sex: 47 y o  female    Assessment/Plan:   Colitis   Assessment & Plan     1  CT with colitis of descending colon, white count 13 9, mild tachycardia  Other VSS  2  Hx of diverticulitis and ischemic colitis per pt  Will start Cipro and Flagyl, monitor CBC  IVF  3  Analgesics as needed  Hold off on steroids until GI evaluates  4  Consult GI  5  Rectal bleeding is minimal, if any increase in hematochezia/chagne in vitals, will monitor H/H closely  Hgb stable at 13 5    6  Consider CTA if increased pain/rectal bleeding              Admission Orders:  2/27/2018 2020 INPATIENT   Scheduled Meds:   Current Facility-Administered Medications:  acetaminophen 650 mg Oral Q6H PRN Jeremy Retort, PA-C    aluminum-magnesium hydroxide-simethicone 30 mL Oral Q6H PRN Jeremy Retort, PA-C    aspirin 81 mg Oral Daily Jeremy Retort, PA-FERNY    ciprofloxacin 400 mg Intravenous Q12H Jeremy Retort, PA-C Last Rate: 400 mg (02/28/18 0916)   metroNIDAZOLE 500 mg Intravenous Q8H Arpita Hunt PA-C Last Rate: 500 mg (02/28/18 0519)   morphine injection 4 mg Intravenous Q4H PRN Letty Hunt PA-C    ondansetron 4 mg Intravenous Q6H PRN Jeremy Retort, PA-FERNY    sodium chloride 100 mL/hr Intravenous Continuous Jeremy Retort, PA-C Last Rate: 100 mL/hr (02/27/18 0118)   traMADol 50 mg Oral Q6H PRN Arpita Hunt PA-C      Continuous Infusions:   sodium chloride 100 mL/hr Last Rate: 100 mL/hr (02/27/18 5058)     PRN Meds:   acetaminophen    aluminum-magnesium hydroxide-simethicone    morphine injection    ondansetron    traMADol - used x 1    OTHER ORDERS: scds  Stool clostridium difficile; enteric bacterial panel  Clears  Consult GI    Per attending on 2/28-   Colitis   Assessment & Plan     CT with colitis of descending colon, white count 13 9, mild tachycardia  Other VSS  Hx of diverticulitis per review of [facility] records; ischemic colitis per pt   Cont cipro and flagyl   Analgesics as needed  Consult GI; appreciate input   Consider CTA if increased pain/rectal bleeding  Certification Statement: The patient will continue to require additional inpatient hospital stay due to colitis on antibotics         Thank you,  [address] [facility] in the [facility] by Salo Lowe for 2017  Network Utilization Review Department  Phone: [phone]; Fax [phone]  ATTENTION: The Network Utilization Review Department is now centralized for our 7 Facilities  Make a note that we have a new phone and fax numbers for our Department  Please call with any questions or concerns to [phone] and carefully follow the prompts so that you are directed to the right person  All voicemails are confidential  Fax any determinations, approvals, denials, and requests for initial or continue stay review clinical to [phone]  Due to HIGH CALL volume, it would be easier if you could please send faxed requests to expedite your requests and in part, help us provide discharge notifications faster

## 2018-03-01 ENCOUNTER — APPOINTMENT (INPATIENT)
Dept: ULTRASOUND IMAGING | Facility: HOSPITAL | Age: 53
DRG: 394 | End: 2018-03-01
Payer: COMMERCIAL

## 2018-03-01 LAB
BASOPHILS # BLD AUTO: 0.01 THOUSANDS/ΜL (ref 0–0.1)
BASOPHILS NFR BLD AUTO: 0 % (ref 0–1)
EOSINOPHIL # BLD AUTO: 0.32 THOUSAND/ΜL (ref 0–0.61)
EOSINOPHIL NFR BLD AUTO: 3 % (ref 0–6)
ERYTHROCYTE [DISTWIDTH] IN BLOOD BY AUTOMATED COUNT: 14.5 % (ref 11.6–15.1)
HCT VFR BLD AUTO: 33 % (ref 34.8–46.1)
HGB BLD-MCNC: 10.8 G/DL (ref 11.5–15.4)
LYMPHOCYTES # BLD AUTO: 1.53 THOUSANDS/ΜL (ref 0.6–4.47)
LYMPHOCYTES NFR BLD AUTO: 15 % (ref 14–44)
MCH RBC QN AUTO: 30.6 PG (ref 26.8–34.3)
MCHC RBC AUTO-ENTMCNC: 32.7 G/DL (ref 31.4–37.4)
MCV RBC AUTO: 94 FL (ref 82–98)
MONOCYTES # BLD AUTO: 0.74 THOUSAND/ΜL (ref 0.17–1.22)
MONOCYTES NFR BLD AUTO: 7 % (ref 4–12)
NEUTROPHILS # BLD AUTO: 7.39 THOUSANDS/ΜL (ref 1.85–7.62)
NEUTS SEG NFR BLD AUTO: 75 % (ref 43–75)
PLATELET # BLD AUTO: 201 THOUSANDS/UL (ref 149–390)
PMV BLD AUTO: 9.3 FL (ref 8.9–12.7)
RBC # BLD AUTO: 3.53 MILLION/UL (ref 3.81–5.12)
WBC # BLD AUTO: 9.99 THOUSAND/UL (ref 4.31–10.16)

## 2018-03-01 PROCEDURE — 85025 COMPLETE CBC W/AUTO DIFF WBC: CPT | Performed by: PHYSICIAN ASSISTANT

## 2018-03-01 PROCEDURE — 87493 C DIFF AMPLIFIED PROBE: CPT | Performed by: PHYSICIAN ASSISTANT

## 2018-03-01 PROCEDURE — 93975 VASCULAR STUDY: CPT

## 2018-03-01 PROCEDURE — 99232 SBSQ HOSP IP/OBS MODERATE 35: CPT | Performed by: INTERNAL MEDICINE

## 2018-03-01 PROCEDURE — 87505 NFCT AGENT DETECTION GI: CPT | Performed by: PHYSICIAN ASSISTANT

## 2018-03-01 RX ADMIN — METRONIDAZOLE 500 MG: 500 INJECTION, SOLUTION INTRAVENOUS at 05:30

## 2018-03-01 RX ADMIN — METRONIDAZOLE 500 MG: 500 INJECTION, SOLUTION INTRAVENOUS at 13:20

## 2018-03-01 RX ADMIN — TRAMADOL HYDROCHLORIDE 50 MG: 50 TABLET, FILM COATED ORAL at 00:54

## 2018-03-01 RX ADMIN — TRAMADOL HYDROCHLORIDE 50 MG: 50 TABLET, FILM COATED ORAL at 13:19

## 2018-03-01 RX ADMIN — CIPROFLOXACIN 400 MG: 2 INJECTION, SOLUTION INTRAVENOUS at 09:43

## 2018-03-01 RX ADMIN — SODIUM CHLORIDE 100 ML/HR: 0.9 INJECTION, SOLUTION INTRAVENOUS at 21:14

## 2018-03-01 RX ADMIN — SODIUM CHLORIDE 100 ML/HR: 0.9 INJECTION, SOLUTION INTRAVENOUS at 11:32

## 2018-03-01 RX ADMIN — ASPIRIN 81 MG 81 MG: 81 TABLET ORAL at 08:30

## 2018-03-01 RX ADMIN — CIPROFLOXACIN 400 MG: 2 INJECTION, SOLUTION INTRAVENOUS at 21:14

## 2018-03-01 RX ADMIN — METRONIDAZOLE 500 MG: 500 INJECTION, SOLUTION INTRAVENOUS at 22:16

## 2018-03-01 NOTE — PLAN OF CARE
DISCHARGE PLANNING     Discharge to home or other facility with appropriate resources Progressing        GASTROINTESTINAL - ADULT     Minimal or absence of nausea and/or vomiting Progressing     Maintains or returns to baseline bowel function Progressing     Maintains adequate nutritional intake Progressing        PAIN - ADULT     Verbalizes/displays adequate comfort level or baseline comfort level Progressing        SAFETY ADULT     Patient will remain free of falls Progressing

## 2018-03-01 NOTE — SOCIAL WORK
Per nursing rounds this AM, patient is doing better today  No bowel movements  Resting the bowel at this time  Patient is currently on clear liquids and currently working on determining the type of colitis  Patient may d c home with PO antibiotic if colitis is infectious type  Patient currently on IV antibiotics, IV fluids, and is ambulating herself to and from bathroom  No need from case management stand point at this time, CM department will follow patient through discharge  Possible d/c 24hrs pending medical clearance

## 2018-03-01 NOTE — PLAN OF CARE
Problem: DISCHARGE PLANNING - CARE MANAGEMENT  Goal: Discharge to post-acute care or home with appropriate resources  INTERVENTIONS:  - Conduct assessment to determine patient/family and health care team treatment goals, and need for post-acute services based on payer coverage, community resources, and patient preferences, and barriers to discharge  - Address psychosocial, clinical, and financial barriers to discharge as identified in assessment in conjunction with the patient/family and health care team  - Arrange appropriate level of post-acute services according to patients   needs and preference and payer coverage in collaboration with the physician and health care team  - Communicate with and update the patient/family, physician, and health care team regarding progress on the discharge plan  - Arrange appropriate transportation to post-acute venues   Outcome: Progressing  Per nursing rounds this AM, patient is doing better today  No bowel movements  Resting the bowel at this time  Patient is currently on clear liquids and currently working on determining the type of colitis  Patient may d c home with PO antibiotic if colitis is infectious type  Patient currently on IV antibiotics, IV fluids, and is ambulating herself to and from bathroom  No need from case management stand point at this time, CM department will follow patient through discharge  Possible d/c 24hrs pending medical clearance

## 2018-03-01 NOTE — PROGRESS NOTES
Progress Note - Nuno Marquez 47 y o  female MRN: [id number]    Unit/Bed#: -01 Encounter: [id number]    Assessment and Plan:   Principal Problem:    Colitis  Active Problems:    History of TIAs    Descending Colitis with abdominal pain and bright red bleeding  -Likely ischemic, less likely infectious   -History of ischemic colitis, confirmed by biopsy in 2014  Negative historical workup with doppler U/S, and negative workup for clotting disorders   -Last colonoscopy 3/2015 with sigmoid diverticulosis, poor prep, and tubular adenoma in the cecum     Plan:   -Continue supportive care, pain management per primary  -Continue IV hydration  -Trial clear liquid diet  -check stool studies if has a bm  -Continue antibiotics for now  -Will need outpatient follow-up and outpatient colonoscopy  ----------------------------------------------------------------------------------------------------------------    Subjective:     Much improved  No further bleeding, has not had a bm since ~8am 2/28  Has been hesitant to trial clears  Abdominal pain is improved, did not need pain medication this Am  Slept well overnight    Objective:     Vitals: Blood pressure 117/66, pulse 78, temperature 98 3 °F (36 8 °C), resp  rate 18, weight 93 3 kg (205 lb 11 oz), SpO2 95 %  ,Body mass index is 35 31 kg/m²      No intake or output data in the 24 hours ending 03/01/18 1044    Physical Exam:     General Appearance: Alert, appears stated age and cooperative  Lungs: Clear to auscultation bilaterally, no rales or rhonchi, no labored breathing/accessory muscle use  Heart: Regular rate and rhythm, S1, S2 normal, no murmur, click, rub or gallop  Abdomen: Soft, mild LLQ pain, non-distended; bowel sounds normal; no masses or no organomegaly  Extremities: No cyanosis, clubbing, or edema    Invasive Devices     Peripheral Intravenous Line            Peripheral IV 02/27/18 Left Antecubital 1 day                Lab Results:    Results from last 7 days  Lab Units 03/01/18  0905   WBC Thousand/uL 9 99   HEMOGLOBIN g/dL 10 8*   HEMATOCRIT % 33 0*   PLATELETS Thousands/uL 201   NEUTROS PCT % 75   LYMPHS PCT % 15   MONOS PCT % 7   EOS PCT % 3       Results from last 7 days  Lab Units 02/28/18  0708   SODIUM mmol/L 139   POTASSIUM mmol/L 3 5   CHLORIDE mmol/L 106   CO2 mmol/L 25   BUN mg/dL 13   CREATININE mg/dL 0 73   CALCIUM mg/dL 8 5   GLUCOSE RANDOM mg/dL 98       Results from last 7 days  Lab Units 02/27/18  1802   INR  0 84*           Imaging Studies: I have personally reviewed pertinent imaging studies  Ct Abdomen Pelvis With Contrast    Result Date: 2/27/2018  Impression: Colitis of the descending colon   Workstation performed: [id number]

## 2018-03-01 NOTE — PLAN OF CARE
DISCHARGE PLANNING     Discharge to home or other facility with appropriate resources Progressing        DISCHARGE PLANNING - CARE MANAGEMENT     Discharge to post-acute care or home with appropriate resources Progressing        PAIN - ADULT     Verbalizes/displays adequate comfort level or baseline comfort level Progressing        SAFETY ADULT     Patient will remain free of falls Progressing

## 2018-03-01 NOTE — PROGRESS NOTES
Progress Note - Opal Farrar 1965, 47 y o  female MRN: [id number]    Unit/Bed#: -01 Encounter: [id number]    Primary Care Provider: Aylin Harrington MD   Date and time admitted to hospital: 2/27/2018  5:27 PM      History of TIAs   Assessment & Plan    · Noted; cont ASA  · No apparent issues         * Colitis   Assessment & Plan    · Symptoms of abdominal pain and BRBPR, symptoms slowly improving  Trend hgb   · GI following, appreciate input  · Likely ischemic colitis, less likely infectious v diverticular  Has hx of ischemic colitis confirmed by bx in 2014  · Check stool studies if able to go, has not had BM in 1 day  Continue IV cipro/flagyl for now  · CT with evidence of colitis in descending colon  · Currently on clears and does not want to advance yet, continue IVF  · Leukocytosis improving, afebrile  · Needs outpatient colonoscopy           VTE Pharmacologic Prophylaxis:   Pharmacologic: low risk, ambulatory   Mechanical VTE Prophylaxis in Place: Yes    Patient Centered Rounds: I have performed bedside rounds with nursing staff today  EDITE    Discussions with Specialists or Other Care Team Provider: appreciate GI input  Education and Discussions with Family / Patient: patient  Time Spent for Care: 30 minutes  More than 50% of total time spent on counseling and coordination of care as described above  Current Length of Stay: 2 day(s)    Current Patient Status: Inpatient   Certification Statement: The patient will continue to require additional inpatient hospital stay due to need to advance diet and monitor symptoms of pain     Discharge Plan: home tomorrow if tolerating diet  Code Status: Level 1 - Full Code      Subjective:   Pt reports having less abdominal pain today  No nausea or vomiting yet she is reluctant to eat  She is relying on IVF   I stated that she needs to at least try eating some clear liquids so we can assess how she responds to this and advance her diet as tolerated  She understands and states she will try some clears today  She denies having any bowel movement in about 24 hours  Objective:     Vitals:   Temp (24hrs), Av 3 °F (36 8 °C), Min:98 1 °F (36 7 °C), Max:98 4 °F (36 9 °C)    HR:  [81-94] 81  Resp:  [18] 18  BP: (109-110)/(62-64) 110/64  SpO2:  [94 %-95 %] 94 %  Body mass index is 35 31 kg/m²  Input and Output Summary (last 24 hours):     No intake or output data in the 24 hours ending 18 0946    Physical Exam:     Physical Exam   Constitutional: She is oriented to person, place, and time  No distress  Cardiovascular: Normal rate and regular rhythm  Pulmonary/Chest: Effort normal and breath sounds normal  No respiratory distress  She has no wheezes  Abdominal: Soft  She exhibits no distension  There is tenderness (LLQ)  Dec bowel sounds    Musculoskeletal: She exhibits no edema  Neurological: She is alert and oriented to person, place, and time  Skin: Skin is warm and dry  She is not diaphoretic  Psychiatric:   Flat    Nursing note and vitals reviewed  Additional Data:     Labs:      Results from last 7 days  Lab Units 18  0905   WBC Thousand/uL 9 99   HEMOGLOBIN g/dL 10 8*   HEMATOCRIT % 33 0*   PLATELETS Thousands/uL 201   NEUTROS PCT % 75   LYMPHS PCT % 15   MONOS PCT % 7   EOS PCT % 3       Results from last 7 days  Lab Units 18  0708   SODIUM mmol/L 139   POTASSIUM mmol/L 3 5   CHLORIDE mmol/L 106   CO2 mmol/L 25   BUN mg/dL 13   CREATININE mg/dL 0 73   CALCIUM mg/dL 8 5   GLUCOSE RANDOM mg/dL 98       Results from last 7 days  Lab Units 18  1802   INR  0 84*       * I Have Reviewed All Lab Data Listed Above  * Additional Pertinent Lab Tests Reviewed: All Labs Within Last 24 Hours Reviewed    Imaging:    Imaging Reports Reviewed Today Include:  All  Imaging Personally Reviewed by Myself Includes:  none    Recent Cultures (last 7 days):           Last 24 Hours Medication List:     Current Facility-Administered Medications:  acetaminophen 650 mg Oral Q6H PRN Jeremy Labrum, PA-C    aluminum-magnesium hydroxide-simethicone 30 mL Oral Q6H PRN Jeremy Labrum, PA-C    aspirin 81 mg Oral Daily Jeremy Labrum, PA-C    ciprofloxacin 400 mg Intravenous Q12H Jeremy Labrum, PA-C Last Rate: 400 mg (03/01/18 0943)   metroNIDAZOLE 500 mg Intravenous Q8H Arpita Hunt PA-C Last Rate: 500 mg (03/01/18 0530)   morphine injection 4 mg Intravenous Q4H PRN Jeremy Labrum, PA-C    ondansetron 4 mg Intravenous Q6H PRN Jeremy Labrum, PA-C    sodium chloride 100 mL/hr Intravenous Continuous Jeremy Labrum, PA-C Last Rate: 100 mL/hr (02/28/18 1140)   traMADol 50 mg Oral Q6H PRN Jeremy Labrum, PA-C         Today, Patient Was Seen By: Rajan Owens PA-C    ** Please Note: Dictation voice to text software may have been used in the creation of this document   **

## 2018-03-01 NOTE — ASSESSMENT & PLAN NOTE
· Symptoms of abdominal pain and BRBPR, symptoms slowly improving  Trend hgb   · GI following, appreciate input  · Likely ischemic colitis, less likely infectious v diverticular   Has hx of ischemic colitis confirmed by bx in 2014  · Check stool studies if able to go, has not had BM in 1 day  · CT with evidence of colitis in descending colon  · Currently on clears and does not want to advance yet, continue IVF  · Leukocytosis improving, afebrile  · Needs outpatient colonoscopy

## 2018-03-01 NOTE — PLAN OF CARE
GASTROINTESTINAL - ADULT     Minimal or absence of nausea and/or vomiting Progressing     Maintains or returns to baseline bowel function Progressing     Maintains adequate nutritional intake Progressing        PAIN - ADULT     Verbalizes/displays adequate comfort level or baseline comfort level Progressing        SAFETY ADULT     Patient will remain free of falls Progressing

## 2018-03-02 LAB
ANION GAP SERPL CALCULATED.3IONS-SCNC: 9 MMOL/L (ref 4–13)
BASOPHILS # BLD AUTO: 0.02 THOUSANDS/ΜL (ref 0–0.1)
BASOPHILS NFR BLD AUTO: 0 % (ref 0–1)
BUN SERPL-MCNC: 9 MG/DL (ref 5–25)
C DIFF TOX GENS STL QL NAA+PROBE: NORMAL
CALCIUM SERPL-MCNC: 7.9 MG/DL (ref 8.3–10.1)
CAMPYLOBACTER DNA SPEC NAA+PROBE: NORMAL
CHLORIDE SERPL-SCNC: 109 MMOL/L (ref 100–108)
CO2 SERPL-SCNC: 24 MMOL/L (ref 21–32)
CREAT SERPL-MCNC: 0.73 MG/DL (ref 0.6–1.3)
EOSINOPHIL # BLD AUTO: 0.47 THOUSAND/ΜL (ref 0–0.61)
EOSINOPHIL NFR BLD AUTO: 6 % (ref 0–6)
ERYTHROCYTE [DISTWIDTH] IN BLOOD BY AUTOMATED COUNT: 14.3 % (ref 11.6–15.1)
GFR SERPL CREATININE-BSD FRML MDRD: 95 ML/MIN/1.73SQ M
GLUCOSE SERPL-MCNC: 81 MG/DL (ref 65–140)
HCT VFR BLD AUTO: 31.5 % (ref 34.8–46.1)
HGB BLD-MCNC: 10.1 G/DL (ref 11.5–15.4)
LYMPHOCYTES # BLD AUTO: 1.62 THOUSANDS/ΜL (ref 0.6–4.47)
LYMPHOCYTES NFR BLD AUTO: 19 % (ref 14–44)
MAGNESIUM SERPL-MCNC: 1.7 MG/DL (ref 1.6–2.6)
MCH RBC QN AUTO: 30.1 PG (ref 26.8–34.3)
MCHC RBC AUTO-ENTMCNC: 32.1 G/DL (ref 31.4–37.4)
MCV RBC AUTO: 94 FL (ref 82–98)
MONOCYTES # BLD AUTO: 0.76 THOUSAND/ΜL (ref 0.17–1.22)
MONOCYTES NFR BLD AUTO: 9 % (ref 4–12)
NEUTROPHILS # BLD AUTO: 5.47 THOUSANDS/ΜL (ref 1.85–7.62)
NEUTS SEG NFR BLD AUTO: 66 % (ref 43–75)
PLATELET # BLD AUTO: 194 THOUSANDS/UL (ref 149–390)
PMV BLD AUTO: 9.3 FL (ref 8.9–12.7)
POTASSIUM SERPL-SCNC: 3.5 MMOL/L (ref 3.5–5.3)
RBC # BLD AUTO: 3.36 MILLION/UL (ref 3.81–5.12)
SALMONELLA DNA SPEC QL NAA+PROBE: NORMAL
SHIGA TOXIN STX GENE SPEC NAA+PROBE: NORMAL
SHIGELLA DNA SPEC QL NAA+PROBE: NORMAL
SODIUM SERPL-SCNC: 142 MMOL/L (ref 136–145)
WBC # BLD AUTO: 8.34 THOUSAND/UL (ref 4.31–10.16)

## 2018-03-02 PROCEDURE — 85025 COMPLETE CBC W/AUTO DIFF WBC: CPT | Performed by: INTERNAL MEDICINE

## 2018-03-02 PROCEDURE — 83735 ASSAY OF MAGNESIUM: CPT | Performed by: INTERNAL MEDICINE

## 2018-03-02 PROCEDURE — 93975 VASCULAR STUDY: CPT | Performed by: SURGERY

## 2018-03-02 PROCEDURE — 99232 SBSQ HOSP IP/OBS MODERATE 35: CPT | Performed by: NURSE PRACTITIONER

## 2018-03-02 PROCEDURE — 99254 IP/OBS CNSLTJ NEW/EST MOD 60: CPT | Performed by: PHYSICIAN ASSISTANT

## 2018-03-02 PROCEDURE — 99232 SBSQ HOSP IP/OBS MODERATE 35: CPT | Performed by: INTERNAL MEDICINE

## 2018-03-02 PROCEDURE — 80048 BASIC METABOLIC PNL TOTAL CA: CPT | Performed by: INTERNAL MEDICINE

## 2018-03-02 RX ADMIN — CIPROFLOXACIN 400 MG: 2 INJECTION, SOLUTION INTRAVENOUS at 10:01

## 2018-03-02 RX ADMIN — METRONIDAZOLE 500 MG: 500 INJECTION, SOLUTION INTRAVENOUS at 23:09

## 2018-03-02 RX ADMIN — ASPIRIN 81 MG 81 MG: 81 TABLET ORAL at 08:40

## 2018-03-02 RX ADMIN — SODIUM CHLORIDE 100 ML/HR: 0.9 INJECTION, SOLUTION INTRAVENOUS at 08:38

## 2018-03-02 RX ADMIN — METRONIDAZOLE 500 MG: 500 INJECTION, SOLUTION INTRAVENOUS at 14:34

## 2018-03-02 RX ADMIN — SODIUM CHLORIDE 100 ML/HR: 0.9 INJECTION, SOLUTION INTRAVENOUS at 21:36

## 2018-03-02 RX ADMIN — TRAMADOL HYDROCHLORIDE 50 MG: 50 TABLET, FILM COATED ORAL at 00:04

## 2018-03-02 RX ADMIN — CIPROFLOXACIN 400 MG: 2 INJECTION, SOLUTION INTRAVENOUS at 21:37

## 2018-03-02 RX ADMIN — METRONIDAZOLE 500 MG: 500 INJECTION, SOLUTION INTRAVENOUS at 05:00

## 2018-03-02 NOTE — PLAN OF CARE
Problem: DISCHARGE PLANNING - CARE MANAGEMENT  Goal: Discharge to post-acute care or home with appropriate resources  INTERVENTIONS:  - Conduct assessment to determine patient/family and health care team treatment goals, and need for post-acute services based on payer coverage, community resources, and patient preferences, and barriers to discharge  - Address psychosocial, clinical, and financial barriers to discharge as identified in assessment in conjunction with the patient/family and health care team  - Arrange appropriate level of post-acute services according to patients   needs and preference and payer coverage in collaboration with the physician and health care team  - Communicate with and update the patient/family, physician, and health care team regarding progress on the discharge plan  - Arrange appropriate transportation to post-acute venues   Outcome: Progressing  Per nursing rounds this AM, patient is currently on IV antibiotics, IV steroids, and will most likely not discharge for another 24hrs pending medical clearance  Cm will continue to follow patient

## 2018-03-02 NOTE — PROGRESS NOTES
[facility]  Progress Note - Abebediogenes Servin 1965, 47 y o  female MRN: [id number]    Unit/Bed#: -01 Encounter: [id number]    Primary Care Provider: Chavo Winter MD   Date and time admitted to hospital: 2/27/2018  5:27 PM    * Colitis   Assessment & Plan    · CT with evidence of colitis in descending colon, continue cipro and flagyl  · Symptoms of abdominal pain, patient feeling much better today  · GI following, appreciate input  · Likely ischemic colitis, less likely infectious v diverticular  Has hx of ischemic colitis confirmed by bx in 2014  · Patient had soft bm yesterday, Stool studies pending, f/u result  · Currently on clears and per patient has been tolerating, will advance to full liquid and monitor for symptoms  Patient agreeable to plan  · Hemoglobin 10 1 today  Monitor  Patient denied any blood in stool yesterday  · Leukocytosis resoved, afebrile  · Vascular consulted per GI as 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  Patent SMA and also in the setting of recurrent ischemia  · Will need outpatient colonoscopy  History of TIAs   Assessment & Plan    · Noted; cont ASA  · No apparent issues           Pharmacologic: low risk, ambulatory  Mechanical VTE Prophylaxis in Place: Yes    Patient Centered Rounds: I have performed bedside rounds with nursing staff today  Edithe    Discussions with Specialists or Other Care Team Provider: nursing, case management     Education and Discussions with Family / Patient: Patient     Time Spent for Care: 30 minutes  More than 50% of total time spent on counseling and coordination of care as described above      Current Length of Stay: 3 day(s)    Current Patient Status: Inpatient   Certification Statement: The patient will continue to require additional inpatient hospital stay due to monitor for symptoms of pain, f/u stool studies, advance diet    Discharge Plan / Estimated Discharge Date: Later today or tomorrow       Code Status: Level 1 - Full Code      Subjective:   Patient feels better today  Complains of some LLQ pain but only with palpation  Denies any nausea or vomiting  Ok with trialing full liquids  States she has not passed gas but did have a loose bm yesterday in which she did not note any blood  Objective:     Vitals:   Temp (24hrs), Av 1 °F (36 7 °C), Min:98 °F (36 7 °C), Max:98 2 °F (36 8 °C)    HR:  [78-81] 78  Resp:  [16-18] 16  BP: (122-150)/(68-72) 122/70  SpO2:  [92 %-94 %] 92 %  Body mass index is 35 31 kg/m²  Input and Output Summary (last 24 hours): Intake/Output Summary (Last 24 hours) at 18 1009  Last data filed at 18 1800   Gross per 24 hour   Intake              280 ml   Output                0 ml   Net              280 ml       Physical Exam:     Physical Exam   Constitutional: She is oriented to person, place, and time  She appears well-developed  No distress  HENT:   Head: Normocephalic  Eyes: Pupils are equal, round, and reactive to light  Neck: Normal range of motion  Cardiovascular: Normal rate and regular rhythm  No murmur heard  Pulmonary/Chest: Effort normal and breath sounds normal    Abdominal: There is tenderness (slight tenderness to LLQ with palpation  )  Musculoskeletal: Normal range of motion  She exhibits no edema  Neurological: She is alert and oriented to person, place, and time  Skin: Skin is warm and dry  Psychiatric: She has a normal mood and affect  Nursing note and vitals reviewed        Additional Data:     Labs:      Results from last 7 days  Lab Units 18  0459   WBC Thousand/uL 8 34   HEMOGLOBIN g/dL 10 1*   HEMATOCRIT % 31 5*   PLATELETS Thousands/uL 194   NEUTROS PCT % 66   LYMPHS PCT % 19   MONOS PCT % 9   EOS PCT % 6       Results from last 7 days  Lab Units 18  0459   SODIUM mmol/L 142   POTASSIUM mmol/L 3 5   CHLORIDE mmol/L 109*   CO2 mmol/L 24   BUN mg/dL 9   CREATININE mg/dL 0 73 CALCIUM mg/dL 7 9*   GLUCOSE RANDOM mg/dL 81       Results from last 7 days  Lab Units 02/27/18  1802   INR  0 84*         Recent Cultures (last 7 days):           Last 24 Hours Medication List:     Current Facility-Administered Medications:  acetaminophen 650 mg Oral Q6H PRN Choco Arroyo PA-C    aluminum-magnesium hydroxide-simethicone 30 mL Oral Q6H PRN Choco Arroyo PA-C    aspirin 81 mg Oral Daily Choco Arroyo PA-C    ciprofloxacin 400 mg Intravenous Q12H Choco Arroyo PA-C Last Rate: 400 mg (03/02/18 1001)   metroNIDAZOLE 500 mg Intravenous Q8H Arpita Hunt PA-C Last Rate: 500 mg (03/02/18 0500)   morphine injection 4 mg Intravenous Q4H PRN Arpita Hunt PA-C    ondansetron 4 mg Intravenous Q6H PRN Choco Arroyo PA-C    sodium chloride 100 mL/hr Intravenous Continuous Choco Arroyo PA-C Last Rate: 100 mL/hr (03/02/18 0599)   traMADol 50 mg Oral Q6H PRN Choco Arroyo PA-C         Today, Patient Was Seen By: EBEN Yap    ** Please Note: Dragon 360 Dictation voice to text software may have been used in the creation of this document   **

## 2018-03-02 NOTE — PROGRESS NOTES
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, patent SMA     Plan:   -Continue supportive care, pain management per primary  -Continue IV hydration  -Trial advancement to full liquids  -Will consult vascular given recurrent ischemia in a 46 y/o and doppler findings  -f/u stool studies  -Continue antibiotics for now  -Will need outpatient follow-up and outpatient colonoscopy  ----------------------------------------------------------------------------------------------------------------    Subjective:     She feels much improved, is tolerating clear liquids without pain  Had a looser bm last night without blood  Still some generalized LLQ tenderness  Doppler with >70% stenosis of ANGELA/celiac    Objective:     Vitals: Blood pressure 122/70, pulse 78, temperature 98 °F (36 7 °C), temperature source Oral, resp  rate 16, weight 93 3 kg (205 lb 11 oz), SpO2 92 %  ,Body mass index is 35 31 kg/m²        Intake/Output Summary (Last 24 hours) at 03/02/18 0902  Last data filed at 03/01/18 1800   Gross per 24 hour   Intake              280 ml   Output                0 ml   Net              280 ml       Physical Exam:     General Appearance: Alert, appears stated age and cooperative  Lungs: Clear to auscultation bilaterally, no rales or rhonchi, no labored breathing/accessory muscle use  Heart: Regular rate and rhythm, S1, S2 normal, no murmur, click, rub or gallop  Abdomen: Soft, mildly tender LLQ without guarding, non-distended; bowel sounds normal; no masses or no organomegaly  Extremities: No cyanosis, clubbing, or edema    Invasive Devices     Peripheral Intravenous Line            Peripheral IV 02/27/18 Left Antecubital 2 days                Lab Results:    Results from last 7 days  Lab Units 03/02/18  0459   WBC Thousand/uL 8 34   HEMOGLOBIN g/dL 10 1*   HEMATOCRIT % 31 5*   PLATELETS Thousands/uL 194   NEUTROS PCT % 66   LYMPHS PCT % 19   MONOS PCT % 9   EOS PCT % 6       Results from last 7 days  Lab Units 03/02/18  0459   SODIUM mmol/L 142   POTASSIUM mmol/L 3 5   CHLORIDE mmol/L 109*   CO2 mmol/L 24   BUN mg/dL 9   CREATININE mg/dL 0 73   CALCIUM mg/dL 7 9*   GLUCOSE RANDOM mg/dL 81       Results from last 7 days  Lab Units 02/27/18  1802   INR  0 84*           Imaging Studies: I have personally reviewed pertinent imaging studies  Ct Abdomen Pelvis With Contrast    Result Date: 2/27/2018  Impression: Colitis of the descending colon   Workstation performed: [id number]

## 2018-03-02 NOTE — CONSULTS
Consult Note - Vascular Surgery   Lydia Vale García 47 y o  female MRN: [id number]    Unit/Bed#: -01 Encounter: [id number]    Assessment:  1  Descending colitis  2  Celiac / SMA increased velocities on vascular duplex  3  History of TIAs  4  Prior tobacco addiction - quit in 2014   5   Obesity    Recommendations:     - CT abdomen and Vascular duplex of celiac and mesenteric arteries were reviewed with Dr Sydney Francis Doctor  The celiac and mesenteric vessels by ultrasound show, overall, only minimal plaquing  It is possible the study is over estimating the velocities with in the vessels, perhaps due to her body habitus  In any case, she has suffered the colitis in the past with vascular studies at that time showing normal, patent vessels in the abdomen  Doubt that intervention on celiac/ANGELA would prevent colitis  She potentially could have small vessel disease or other process affecting the descending colon  There does not appear to be a role for any acute vascular intervention       - Recommend continuation of supportive care and GI work up for causes of colitis   - She remains on aspirin     - Patient education was provided  - We will have our office reach out to her for follow up in the office +/- repeat duplex in the next 1-2 months  - Our findings/ thoughts / recommendations were discussed with the patient and Dr Alina Duong, hospitalist      Requesting Service: SLIM    Re:  Celiac / SMA stenosis    HPI: Marquis Nuñez is a 47 y o  female who presents with descending colitis  The patient presented with sudden onset of LLQ abd and pelvic pain with loose, bloody stools  She felt ill with fevers, chills and nausea  She is admitted to the hospital for colitis and started on ciprofloxacin Iv,  metronidazole Iv and fluids  Overall, symptomatically she feels much better  Last BM yesterday, she still had bloody stool  CT scan with contrast of the abdomen s/f colitis involving the descending colon  Vascular duplex Celiacs and mesenterics shows velocities suggesting > 70% stenosis in the Celiac and ANGELA with patent SMA  Vascular surgery was asked to evaluate the patient  Patient has a past medical history significant for colitis in 2014 - workup and biopsy suggest ischemic etiology  Vascular duplex performed in 2014 at 2015 showed all 3 (celiac, ANGELA, SMA) arteries were patent  Since hospitalization in 2014 she does try to be careful about foods that she eats  She has had no weight loss, in fact she has had weight gain in the past months  She is obese with BMI of 35  She has no food fear post prandial abdominal discomfort  She has no nausea and vomiting at baseline  No reflux  She had a remote laparoscopic cholecystectomy  She gives a history of tobacco addiction  She quit smoking in 2014 after colitis  History of TIAs with left facial, upper and lower extremity numbness for which she was placed on aspirin  She reports that she had a KIMBERLY over 10 years ago which was negative  She does c/w palpitations  She has a bedside commode next to her bed at home  No overt chest pain  SOB with activity  Mild edema at the end of the day  No dizziness, lightheadedness  Review of Systems:  General: recent fevers, chills as per hpi  Cardiovascular: positive for - dyspnea on exertion  Respiratory: no cough, chronic lung disease  Gastrointestinal: per HPI  Genitourinary ROS: negative  Musculoskeletal ROS: no specific joint complaints  Neurological ROS: no neuro sx  Hematological and Lymphatic ROS: positive for - gi bleeding     Dermatological ROS: negative  Psychological ROS: negative  Ophthalmic ROS: negative  ENT ROS: negative    Past Medical History:  Past Medical History:   Diagnosis Date    Diverticulitis        Past Surgical History:  Past Surgical History:   Procedure Laterality Date    CHOLECYSTECTOMY      COLONOSCOPY         Social History:  History   Alcohol Use No     History   Drug Use No History   Smoking Status    Former Smoker   Smokeless Tobacco    Not on file       Family History:  History reviewed  No pertinent family history  Allergies:  No Known Allergies    Medications:  Current Facility-Administered Medications   Medication Dose Route Frequency    acetaminophen (TYLENOL) tablet 650 mg  650 mg Oral Q6H PRN    aluminum-magnesium hydroxide-simethicone (MYLANTA) 200-200-20 mg/5 mL oral suspension 30 mL  30 mL Oral Q6H PRN    aspirin chewable tablet 81 mg  81 mg Oral Daily    ciprofloxacin (CIPRO) IVPB (premix) 400 mg  400 mg Intravenous Q12H    metroNIDAZOLE (FLAGYL) IVPB (premix) 500 mg  500 mg Intravenous Q8H    morphine (PF) 4 mg/mL injection 4 mg  4 mg Intravenous Q4H PRN    ondansetron (ZOFRAN) injection 4 mg  4 mg Intravenous Q6H PRN    sodium chloride 0 9 % infusion  100 mL/hr Intravenous Continuous    traMADol (ULTRAM) tablet 50 mg  50 mg Oral Q6H PRN       Vitals:  /76 (BP Location: Right arm)   Pulse 77   Temp 97 8 °F (36 6 °C) (Oral)   Resp 16   Wt 93 3 kg (205 lb 11 oz)   SpO2 98%   BMI 35 31 kg/m²     I/Os:  I/O last 24 hours:   In: 280 [P O :280]  Out: -     Lab Results and Cultures:   Lab Results   Component Value Date    WBC 8 34 03/02/2018    HGB 10 1 (L) 03/02/2018    HCT 31 5 (L) 03/02/2018    MCV 94 03/02/2018     03/02/2018     Lab Results   Component Value Date    GLUCOSE 81 03/02/2018    CALCIUM 7 9 (L) 03/02/2018     03/02/2018    K 3 5 03/02/2018    CO2 24 03/02/2018     (H) 03/02/2018    BUN 9 03/02/2018    CREATININE 0 73 03/02/2018     Lab Results   Component Value Date    INR 0 84 (L) 02/27/2018    INR 1 30 (H) 11/22/2014    PROTIME 11 8 (L) 02/27/2018    PROTIME 15 4 (H) 11/22/2014       Lipid Panel:   Lab Results   Component Value Date    CHOL 205 (H) 01/22/2018    CHOL 201 01/16/2014   ,     Blood Culture: No results found for: BLOODCX,   Urinalysis: No results found for: Nathanael Ice, Nolene Bury, LEUKOCYTESUR, NITRITE, PROTEINUA, GLUCOSEU, KETONESU, BILIRUBINUR, BLOODU,   Urine Culture: No results found for: URINECX,   Wound Culure: No results found for: WOUNDCULT    Physical Exam:    General appearance: alert and oriented, in no acute distress  Skin: Skin color, texture, turgor normal  No rashes or lesions  Neurologic: Grossly normal  Head: Normocephalic, without obvious abnormality, atraumatic  Eyes: conjunctivae/corneas clear  PERRL, EOM's intact  Fundi benign  Throat: lips, mucosa, and tongue normal; teeth and gums normal  Neck: no adenopathy, no carotid bruit, no JVD, supple, symmetrical, trachea midline and thyroid not enlarged, symmetric, no tenderness/mass/nodules  Lungs: clear to auscultation bilaterally  Chest wall: no tenderness  Heart: regular rate and rhythm, S1, S2 normal, no murmur, click, rub or gallop  Abdomen: obese  tender left mid - lower quadrants  Quiet bs  Extremities: extremities normal, warm and well-perfused; no cyanosis, clubbing, or edema    Pulse exam:  Radial: Right: 2+ Left[de-identified] 2+  DP: Right: 2+ Left: 2+      IMAGING: Studies reviewed with Dr Mirta Ortega Doctor  [facility] 02/01/2018  CLINICAL:  Indications:  Vascular Complications of mesenteric artery [T81 710A]  The  patient presents with ischemic colitis  Risk Factors  The patient has no history of hypertension or renal disease  Clinical  Right Pressure:  117/66 mm Hg     FINDINGS:     Unilateral     Impression  PSV  AP Diam    Sup-Kailee Ao                  93      2 2    Celiac         >70%        285             Prox  SMA                  202             Px Inf-Yobany Ao               86      1 9    Ds Inf-Yobany Ao              106      1 7    ANGELA            >70%        274                Segment     Rig  Left                      PSV  PSV    Prox Renal  122  106       CONCLUSION:  Impression  The aorta is patent and normal in caliber  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    The superior mesenteric artery is normal and patent in a fasting state  There is a >70% stenosis in the inferior mesenteric artery  [facility] RE[facility] 2015     [facility]  [facility] [address]     PATIENT  : Radhika Alvarez   53 F            : 1965  MRN      : [id number]  Type     : O/P     Acct     : [de-identified]  DOS      : 2015      Referring: Kala Clarke   Phone: [phone]  ICD9     :   CPT      : 99851 - Celiac and/or Mesenteric Duplex; complete study  Tech     : DANIELA QURESHI BS RVT RDMS    ReadingMD: Bethany Ovalles MD, RPVI     CLINICAL:  Indications:  Chronic Vascular Insufficiency of the Intestine [K55 1]  Patient  with h/o ischemic colitis 2014 and now c/o intermittent burning pain in  her left upper abdomen post prandial  Patient lost 15 Lbs this past month but  has gained weight in the past year  Risk Factors  The patient has history of obesity      FINDINGS:     Unilateral     Impression  PSV  EDV (cm/s)  AP Diam    Sup-Kailee Ao     Normal      106          17      2 3    Celiac         Normal      147          40             Prox  SMA      Normal      173          42             Px Inf-Yobany Ao  Normal                           1 6    Hepatic        Normal      106          24             Mid  SMA       Normal      109          20             Splenic        Normal      156          41             Dist  SMA      Normal      126          26             Ds Inf-Yobany Ao  Normal                           1  5    ANGELA            Normal      177          21                      CONCLUSION:  Impression  The aorta is patent and normal in caliber  The celiac, splenic and hepatic arteries are patent  The superior and inferior mesenteric arteries are normal and patent in a  fasting state  B/L renal arteries are patent      In comparison to the study of 14 there is no significant change      2014  [facility]   [facility] [address]   PATIENT  : Erika Drew   51 F            : 1965   MRN      : [id number]   Type     : O/P      Acct     : [de-identified]   DOS      : 2014    Referring: Keke Maier   Phone: [phone]   ICD9     : 557 0 - ACUTE VASC INSUFF INTESTINE   CPT      : 19237 - Celiac and/or Mesenteric Duplex; complete study   Tech     : TEREZA MORALEZ,RDMS,RVT     ReadingMD: Eleonora Engle MD RVT      CLINICAL:   Indications:  Acute Mesenteric Ischemia [557 0]  patient admitted for ischemic colitis, complains of abdominal pain   Clinical   Right Pressure:  140/78 mm Hg, Left Pressure:  137/75 mm Hg  FINDINGS:   Unilateral       PSV (cm/s)  EDV (cm/s)  Diameter AP  Diameter Lateral     Sup-Celiac Ao            83          15          2 1               2 3     Jux Renal Aorta                                  1 6               2 0     Px Inf-Yobany Ao            90          19                                    Ds Inf-Yobany Ao            95          26                            1 5     Celiac                  129          35                                    Hepatic                 184          51                                    Splenic                 204          55                                    Prox  SMA               197          51                                    ANGELA                     181          51                                    Segment         Right                   Left                               PSV (cm/s)  EDV (cm/s)  PSV (cm/s)  EDV (cm/s)     Prox Com Iliac         132          48         112          24     CONCLUSION:   Impression   The aorta is patent and normal in caliber  The celiac, splenic and hepatic arteries are patent  The superior and inferior mesenteric arteries are normal and patent in a   fasting state           CT ABDOMEN AND PELVIS WITH IV CONTRAST    INDICATION: Lower abdominal pain with rectal bleeding   History of diverticulitis and colitis  COMPARISON: None  TECHNIQUE:  CT examination of the abdomen and pelvis was performed  Axial, sagittal, and coronal 2D reformatted images were created from the source data and submitted for interpretation  Radiation dose length product (DLP) for this visit: 02 08 70 26 99 mGy-cm    This examination, like all CT scans performed in the [facility], was performed utilizing techniques to minimize radiation dose exposure, including the use of iterative   reconstruction and automated exposure control  IV Contrast:  100 mL of iohexol (OMNIPAQUE)  Enteric Contrast:  Enteric contrast was not administered  FINDINGS:    ABDOMEN    LOWER CHEST:  No clinically significant abnormality identified in the visualized lower chest     LIVER/BILIARY TREE:  Unremarkable  GALLBLADDER: Bi Amble is surgically absent  SPLEEN:  Unremarkable  PANCREAS:  Unremarkable  ADRENAL GLANDS:  Unremarkable  KIDNEYS/URETERS:  Unremarkable  No hydronephrosis  STOMACH AND BOWEL:  Descending colitis without evidence for extraluminal collection  Scattered sigmoid diverticula        APPENDIX:  No findings to suggest appendicitis  ABDOMINOPELVIC CAVITY:  Scattered left-sided and retroperitoneal lymph nodes noted   No intra-abdominal collection or pneumoperitoneum  VESSELS:  Unremarkable for patient's age  PELVIS    REPRODUCTIVE ORGANS:  Unremarkable for patient's age  URINARY BLADDER:  Unremarkable  ABDOMINAL WALL/INGUINAL REGIONS:  Unremarkable  OSSEOUS STRUCTURES:  No acute fracture or destructive osseous lesion  Impression:       Colitis of the descending colon         Elina Lui PA-C  3/2/2018  [facility]

## 2018-03-02 NOTE — SOCIAL WORK
Per nursing rounds this AM, patient is currently on IV antibiotics, IV steroids, and will most likely not discharge for another 24hrs pending medical clearance  Cm will continue to follow patient

## 2018-03-02 NOTE — ASSESSMENT & PLAN NOTE
· CT with evidence of colitis in descending colon  · Symptoms of abdominal pain, patient feeling much better today  · GI following, appreciate input  · Likely ischemic colitis, less likely infectious v diverticular  Has hx of ischemic colitis confirmed by bx in 2014  · Patient had soft bm yesterday, Stool studies pending, f/u result  · Currently on clears and per patient has been tolerating, will advance to full liquid and monitor for symptoms  Patient agreeable to plan  · Hemoglobin 10 1 today  Monitor  Patient denied any blood in stool yesterday  · Leukocytosis resoved, afebrile  · Will need outpatient colonoscopy

## 2018-03-02 NOTE — PLAN OF CARE
DISCHARGE PLANNING     Discharge to home or other facility with appropriate resources Progressing        DISCHARGE PLANNING - CARE MANAGEMENT     Discharge to post-acute care or home with appropriate resources Progressing        GASTROINTESTINAL - ADULT     Minimal or absence of nausea and/or vomiting Progressing     Maintains or returns to baseline bowel function Progressing     Maintains adequate nutritional intake Progressing        PAIN - ADULT     Verbalizes/displays adequate comfort level or baseline comfort level Progressing        SAFETY ADULT     Patient will remain free of falls Progressing

## 2018-03-03 VITALS
DIASTOLIC BLOOD PRESSURE: 80 MMHG | RESPIRATION RATE: 18 BRPM | BODY MASS INDEX: 35.31 KG/M2 | WEIGHT: 205.69 LBS | HEART RATE: 81 BPM | TEMPERATURE: 98.5 F | OXYGEN SATURATION: 98 % | SYSTOLIC BLOOD PRESSURE: 131 MMHG

## 2018-03-03 PROBLEM — K52.9 COLITIS: Status: RESOLVED | Noted: 2018-02-27 | Resolved: 2018-03-03

## 2018-03-03 PROBLEM — Z86.73 HISTORY OF TIAS: Status: RESOLVED | Noted: 2018-02-27 | Resolved: 2018-03-03

## 2018-03-03 LAB
BASOPHILS # BLD AUTO: 0.03 THOUSANDS/ΜL (ref 0–0.1)
BASOPHILS NFR BLD AUTO: 0 % (ref 0–1)
EOSINOPHIL # BLD AUTO: 0.45 THOUSAND/ΜL (ref 0–0.61)
EOSINOPHIL NFR BLD AUTO: 6 % (ref 0–6)
ERYTHROCYTE [DISTWIDTH] IN BLOOD BY AUTOMATED COUNT: 14.2 % (ref 11.6–15.1)
HCT VFR BLD AUTO: 37.3 % (ref 34.8–46.1)
HGB BLD-MCNC: 12.1 G/DL (ref 11.5–15.4)
LYMPHOCYTES # BLD AUTO: 1.73 THOUSANDS/ΜL (ref 0.6–4.47)
LYMPHOCYTES NFR BLD AUTO: 24 % (ref 14–44)
MCH RBC QN AUTO: 30 PG (ref 26.8–34.3)
MCHC RBC AUTO-ENTMCNC: 32.4 G/DL (ref 31.4–37.4)
MCV RBC AUTO: 93 FL (ref 82–98)
MONOCYTES # BLD AUTO: 0.66 THOUSAND/ΜL (ref 0.17–1.22)
MONOCYTES NFR BLD AUTO: 9 % (ref 4–12)
NEUTROPHILS # BLD AUTO: 4.37 THOUSANDS/ΜL (ref 1.85–7.62)
NEUTS SEG NFR BLD AUTO: 61 % (ref 43–75)
PLATELET # BLD AUTO: 242 THOUSANDS/UL (ref 149–390)
PMV BLD AUTO: 9.4 FL (ref 8.9–12.7)
RBC # BLD AUTO: 4.03 MILLION/UL (ref 3.81–5.12)
WBC # BLD AUTO: 7.24 THOUSAND/UL (ref 4.31–10.16)

## 2018-03-03 PROCEDURE — 99232 SBSQ HOSP IP/OBS MODERATE 35: CPT | Performed by: INTERNAL MEDICINE

## 2018-03-03 PROCEDURE — 85025 COMPLETE CBC W/AUTO DIFF WBC: CPT | Performed by: NURSE PRACTITIONER

## 2018-03-03 PROCEDURE — 99239 HOSP IP/OBS DSCHRG MGMT >30: CPT | Performed by: NURSE PRACTITIONER

## 2018-03-03 RX ORDER — METRONIDAZOLE 500 MG/1
500 TABLET ORAL EVERY 8 HOURS SCHEDULED
Qty: 20 TABLET | Refills: 0 | Status: SHIPPED | OUTPATIENT
Start: 2018-03-03 | End: 2018-03-09

## 2018-03-03 RX ORDER — CIPROFLOXACIN 500 MG/1
500 TABLET, FILM COATED ORAL EVERY 12 HOURS SCHEDULED
Qty: 13 TABLET | Refills: 0 | Status: SHIPPED | OUTPATIENT
Start: 2018-03-03 | End: 2018-03-09

## 2018-03-03 RX ADMIN — ASPIRIN 81 MG 81 MG: 81 TABLET ORAL at 09:01

## 2018-03-03 RX ADMIN — METRONIDAZOLE 500 MG: 500 INJECTION, SOLUTION INTRAVENOUS at 05:19

## 2018-03-03 RX ADMIN — SODIUM CHLORIDE 100 ML/HR: 0.9 INJECTION, SOLUTION INTRAVENOUS at 09:02

## 2018-03-03 RX ADMIN — CIPROFLOXACIN 400 MG: 2 INJECTION, SOLUTION INTRAVENOUS at 09:01

## 2018-03-03 RX ADMIN — METRONIDAZOLE 500 MG: 500 INJECTION, SOLUTION INTRAVENOUS at 12:29

## 2018-03-03 NOTE — PROGRESS NOTES
Progress Note - Isabel Triana 47 y o  female MRN: [id number]    Unit/Bed#: -01 Encounter: [id number]        Subjective:   Patient says she is feeling better, she said she passed small amount of bright red blood per rectum last night, has not had any bowel movements though, says she is only having mild discomfort in the left lower quadrant but is tolerating soft diet this morning, which is her 1st meal     Objective:     Vitals: Blood pressure 131/80, pulse 81, temperature 98 5 °F (36 9 °C), temperature source Oral, resp  rate 18, weight 93 3 kg (205 lb 11 oz), SpO2 98 %  ,Body mass index is 35 31 kg/m²  No intake or output data in the 24 hours ending 03/03/18 1244    Physical Exam:   General appearance: alert, appears stated age and cooperative  Lungs: clear to auscultation bilaterally, no labored breathing/accessory muscle use  Heart: regular rate and rhythm, S1, S2 normal, no murmur, click, rub or gallop  Abdomen: soft, mild left lower quadrant tenderness; bowel sounds normal; no masses,  no organomegaly  Extremities: no edema    Invasive Devices     Peripheral Intravenous Line            Peripheral IV 02/27/18 Left Antecubital 3 days                Lab, Imaging and other studies: I have personally reviewed pertinent reports      Admission on 02/27/2018   Component Date Value    Sodium 02/27/2018 143     Potassium 02/27/2018 4 3     Chloride 02/27/2018 107     CO2 02/27/2018 26     Anion Gap 02/27/2018 10     BUN 02/27/2018 22     Creatinine 02/27/2018 0 85     Glucose 02/27/2018 110     Calcium 02/27/2018 9 0     eGFR 02/27/2018 79     WBC 02/27/2018 13 92*    RBC 02/27/2018 4 40     Hemoglobin 02/27/2018 13 4     Hematocrit 02/27/2018 40 6     MCV 02/27/2018 92     MCH 02/27/2018 30 5     MCHC 02/27/2018 33 0     RDW 02/27/2018 14 4     MPV 02/27/2018 9 3     Platelets 74/71/8983 281     Neutrophils Relative 02/27/2018 85*    Lymphocytes Relative 02/27/2018 10*    Monocytes Relative 02/27/2018 4     Eosinophils Relative 02/27/2018 1     Basophils Relative 02/27/2018 0     Neutrophils Absolute 02/27/2018 11 95*    Lymphocytes Absolute 02/27/2018 1 32     Monocytes Absolute 02/27/2018 0 56     Eosinophils Absolute 02/27/2018 0 07     Basophils Absolute 02/27/2018 0 02     Protime 02/27/2018 11 8*    INR 02/27/2018 0 84*    PTT 02/27/2018 29     WBC 02/28/2018 10 92*    RBC 02/28/2018 3 94     Hemoglobin 02/28/2018 11 9     Hematocrit 02/28/2018 36 4     MCV 02/28/2018 92     MCH 02/28/2018 30 2     MCHC 02/28/2018 32 7     RDW 02/28/2018 14 3     Platelets 01/56/6540 248     MPV 02/28/2018 9 7     Sodium 02/28/2018 139     Potassium 02/28/2018 3 5     Chloride 02/28/2018 106     CO2 02/28/2018 25     Anion Gap 02/28/2018 8     BUN 02/28/2018 13     Creatinine 02/28/2018 0 73     Glucose 02/28/2018 98     Calcium 02/28/2018 8 5     eGFR 02/28/2018 95     LACTIC ACID 02/28/2018 0 4*    C difficile toxin by PCR 03/01/2018 NEGATIVE for C difficle toxin by PCR        Salmonella sp PCR 03/01/2018 None Detected     Shigella sp/Enteroinvasi* 03/01/2018 None Detected     Campylobacter sp (jejuni* 03/01/2018 None Detected     Shiga toxin 1/Shiga jorge luis* 03/01/2018 None Detected     WBC 03/01/2018 9 99     RBC 03/01/2018 3 53*    Hemoglobin 03/01/2018 10 8*    Hematocrit 03/01/2018 33 0*    MCV 03/01/2018 94     MCH 03/01/2018 30 6     MCHC 03/01/2018 32 7     RDW 03/01/2018 14 5     MPV 03/01/2018 9 3     Platelets 03/55/7472 201     Neutrophils Relative 03/01/2018 75     Lymphocytes Relative 03/01/2018 15     Monocytes Relative 03/01/2018 7     Eosinophils Relative 03/01/2018 3     Basophils Relative 03/01/2018 0     Neutrophils Absolute 03/01/2018 7 39     Lymphocytes Absolute 03/01/2018 1 53     Monocytes Absolute 03/01/2018 0 74     Eosinophils Absolute 03/01/2018 0 32     Basophils Absolute 03/01/2018 0 01     Sodium 03/02/2018 142     Potassium 03/02/2018 3 5     Chloride 03/02/2018 109*    CO2 03/02/2018 24     Anion Gap 03/02/2018 9     BUN 03/02/2018 9     Creatinine 03/02/2018 0 73     Glucose 03/02/2018 81     Calcium 03/02/2018 7 9*    eGFR 03/02/2018 95     Magnesium 03/02/2018 1 7     WBC 03/02/2018 8 34     RBC 03/02/2018 3 36*    Hemoglobin 03/02/2018 10 1*    Hematocrit 03/02/2018 31 5*    MCV 03/02/2018 94     MCH 03/02/2018 30 1     MCHC 03/02/2018 32 1     RDW 03/02/2018 14 3     MPV 03/02/2018 9 3     Platelets 20/10/2947 194     Neutrophils Relative 03/02/2018 66     Lymphocytes Relative 03/02/2018 19     Monocytes Relative 03/02/2018 9     Eosinophils Relative 03/02/2018 6     Basophils Relative 03/02/2018 0     Neutrophils Absolute 03/02/2018 5 47     Lymphocytes Absolute 03/02/2018 1 62     Monocytes Absolute 03/02/2018 0 76     Eosinophils Absolute 03/02/2018 0 47     Basophils Absolute 03/02/2018 0 02     WBC 03/03/2018 7 24     RBC 03/03/2018 4 03     Hemoglobin 03/03/2018 12 1     Hematocrit 03/03/2018 37 3     MCV 03/03/2018 93     MCH 03/03/2018 30 0     MCHC 03/03/2018 32 4     RDW 03/03/2018 14 2     MPV 03/03/2018 9 4     Platelets 10/84/9284 242     Neutrophils Relative 03/03/2018 61     Lymphocytes Relative 03/03/2018 24     Monocytes Relative 03/03/2018 9     Eosinophils Relative 03/03/2018 6     Basophils Relative 03/03/2018 0     Neutrophils Absolute 03/03/2018 4 37     Lymphocytes Absolute 03/03/2018 1 73     Monocytes Absolute 03/03/2018 0 66     Eosinophils Absolute 03/03/2018 0 45     Basophils Absolute 03/03/2018 0 03      Results for Patrick Lazaro (MRN [id number]) as of 3/3/2018 12:44   Ref   Range 2/27/2018 18:02 2/28/2018 07:08 2/28/2018 08:18 3/1/2018 09:05 3/1/2018 13:23 3/2/2018 04:59 3/3/2018 06:15   eGFR Latest Units: ml/min/1 73sq m 79 95    95    Sodium Latest Ref Range: 136 - 145 mmol/L 143 139    142    Potassium Latest Ref Range: 3 5 - 5 3 mmol/L 4 3 3 5    3 5    Chloride Latest Ref Range: 100 - 108 mmol/L 107 106    109 (H)    CO2 Latest Ref Range: 21 - 32 mmol/L 26 25    24    Anion Gap Latest Ref Range: 4 - 13 mmol/L 10 8    9    BUN Latest Ref Range: 5 - 25 mg/dL 22 13    9    Creatinine Latest Ref Range: 0 60 - 1 30 mg/dL 0 85 0 73    0 73    Glucose Latest Ref Range: 65 - 140 mg/dL 110 98    81    Calcium Latest Ref Range: 8 3 - 10 1 mg/dL 9 0 8 5    7 9 (L)    Magnesium Latest Ref Range: 1 6 - 2 6 mg/dL      1 7    LACTIC ACID Latest Ref Range: 0 5 - 2 0 mmol/L   0 4 (L)       WBC Latest Ref Range: 4 31 - 10 16 Thousand/uL 13 92 (H) 10 92 (H)  9 99  8 34 7 24   RBC Latest Ref Range: 3 81 - 5 12 Million/uL 4 40 3 94  3 53 (L)  3 36 (L) 4 03   Hemoglobin Latest Ref Range: 11 5 - 15 4 g/dL 13 4 11 9  10 8 (L)  10 1 (L) 12 1   Hematocrit Latest Ref Range: 34 8 - 46 1 % 40 6 36 4  33 0 (L)  31 5 (L) 37 3   MCV Latest Ref Range: 82 - 98 fL 92 92  94  94 93   MCH Latest Ref Range: 26 8 - 34 3 pg 30 5 30 2  30 6  30 1 30 0   MCHC Latest Ref Range: 31 4 - 37 4 g/dL 33 0 32 7  32 7  32 1 32 4   RDW Latest Ref Range: 11 6 - 15 1 % 14 4 14 3  14 5  14 3 14 2   Platelets Latest Ref Range: 149 - 390 Thousands/uL 281 248  201  194 242   MPV Latest Ref Range: 8 9 - 12 7 fL 9 3 9 7  9 3  9 3 9 4   Neutrophils Relative Latest Ref Range: 43 - 75 % 85 (H)   75  66 61   Lymphocytes Relative Latest Ref Range: 14 - 44 % 10 (L)   15  19 24   Monocytes Relative Latest Ref Range: 4 - 12 % 4   7  9 9   Eosinophils Relative Latest Ref Range: 0 - 6 % 1   3  6 6   Basophils Relative Latest Ref Range: 0 - 1 % 0   0  0 0   Neutrophils Absolute Latest Ref Range: 1 85 - 7 62 Thousands/µL 11 95 (H)   7 39  5 47 4 37   Lymphocytes Absolute Latest Ref Range: 0 60 - 4 47 Thousands/µL 1 32   1 53  1 62 1 73   Monocytes Absolute Latest Ref Range: 0 17 - 1 22 Thousand/µL 0 56   0 74  0 76 0 66   Eosinophils Absolute Latest Ref Range: 0 00 - 0 61 Thousand/µL 0 07   0 32  0 47 0 45 Basophils Absolute Latest Ref Range: 0 00 - 0 10 Thousands/µL 0 02   0 01  0 02 0 03   Protime Latest Ref Range: 12 1 - 14 4 seconds 11 8 (L)         INR Latest Ref Range: 0 86 - 1 16  0 84 (L)         PTT Latest Ref Range: 23 - 35 seconds 29         CLOSTRIDIUM DIFFICILE TOXIN BY PCR Unknown     Rpt     C DIFF TOXIN B Latest Ref Range: NEGATIVE for C difficle toxin by PCR  NEGATIVE for C di    ENTERIC BACTERIAL PANEL BY PCR Unknown     Rpt         Assessment/Plan:    1  Ischemic colitis of the descending colon, with history of ischemic colitis back in 2014  Doppler study showed greater than 70% stenosis of the celiac/ANGELA with patent SMA    Clinically appears to be improving with respect to this current episode, she tolerated soft food this morning so far    - observe response to low residue diet  - monitor abdominal exam, temperature, white blood cell count  - continue antibiotics  - outpatient colonoscopy in 4-6 weeks  - vascular surgery input appreciated, they are recommending repeat Doppler study in a few months  - discussed case with Kael Sylvester, NP with [facility]

## 2018-03-03 NOTE — DISCHARGE INSTRUCTIONS
Colitis   WHAT YOU NEED TO KNOW:   Colitis is swelling and irritation of your colon  Colitis may be caused by ulcers or a problem with your immune system  Bacteria, a virus, or a parasite may also cause colitis  The cause may not be known  You may have diarrhea, abdominal pain, fever, or blood or mucus in your bowel movement  DISCHARGE INSTRUCTIONS:   Return to the emergency department if:   · You have sudden trouble breathing  · Your bowel movements are black or have blood in them  · You have blood in your vomit  · You have severe abdominal pain or your abdomen is swollen and feels hard  · You have any of the following signs of dehydration:     ¨ Dizziness or weakness    ¨ Dry mouth, cracked lips, or severe thirst    ¨ Fast heartbeat or breathing    ¨ Urinating very little or not at all  Contact your healthcare provider if:   · Your symptoms get worse or do not go away  · You have a fever, chills, cough, or feel weak and achy  · You suddenly lose weight without trying  · You have questions or concerns about your condition or care  Medicines:   · Medicines  may be given to decrease inflammation in your colon and treat diarrhea  · Take your medicine as directed  Contact your healthcare provider if you think your medicine is not helping or if you have side effects  Tell him of her if you are allergic to any medicine  Keep a list of the medicines, vitamins, and herbs you take  Include the amounts, and when and why you take them  Bring the list or the pill bottles to follow-up visits  Carry your medicine list with you in case of an emergency  Manage your symptoms:   · Drink liquids as directed  to help prevent dehydration  Good liquids to drink include water, juice, and broth  Ask how much liquid to drink each day  You may need to drink an oral rehydration solution (ORS)  An ORS contains a balance of water, salt, and sugar to replace body fluids lost during diarrhea       · Eat a variety of healthy foods  Healthy foods include fruits, vegetables, whole-grain breads, beans, low-fat dairy products, lean meats, and fish  You may need to eat several small meals throughout the day instead of large meals  Avoid spicy foods, caffeine, chocolate, and foods high in fat  · Talk to your healthcare provider before you take NSAIDs  NSAIDs can cause worsen your symptoms if ulcers are causing your colitis  · Start to exercise when you feel better  Regular exercise helps your bowels work normally  Ask about the best exercise plan for you  Follow up with your healthcare provider as directed: You may need to return for a colonoscopy or other tests  Write down how often you have a bowel movements and what they look like  Bring this to your follow-up visits  Write down your questions so you remember to ask them during your visits  © 2017 [employer] Information is for End User's use only and may not be sold, redistributed or otherwise used for commercial purposes  All illustrations and images included in CareNotes® are the copyrighted property of A [employer] M [employer]  or [employer]  The above information is an  only  It is not intended as medical advice for individual conditions or treatments  Talk to your doctor, nurse or pharmacist before following any medical regimen to see if it is safe and effective for you  Low Fiber Diet   WHAT YOU NEED TO KNOW:   A low-fiber diet limits foods that are high in fiber  Fiber is the part of fruits, vegetables, and grains that is not broken down by your body  You may need to follow this diet after surgery on your intestines  You may also need to follow this diet for certain conditions such as Crohn disease or ulcerative colitis  Ask your healthcare provider or dietitian how much fiber you can have each day  DISCHARGE INSTRUCTIONS:   Foods to include:  Read food labels to check the amount of fiber that are found in foods  Some foods that are low in fiber include the following:  · Grains:  Choose grains that have less than 2 grams of fiber in each serving  Examples include the following:     ¨ Cream of wheat and finely ground grits    ¨ Dry cereal made from rice     ¨ White bread, white pasta, and white rice    ¨ Crackers, bagels, and rolls made from white or refined flour    · Other foods:      ¨ Canned and well-cooked fruit without skins or seeds, and juice without pulp    ¨ Ripe bananas and melons    ¨ Canned and well-cooked vegetables without skins or seeds, and vegetable juice    ¨ Cow's milk, lactose-free milk, soy milk, and rice milk    ¨ Yogurt without nuts, fruit, or granola    ¨ Eggs, poultry (such as chicken and turkey), fish, and tender, ground, well-cooked beef     ¨ Tofu and smooth peanut butter    ¨ Broth and strained soups made of low-fiber foods  Foods to avoid:   · Breads, cereals, crackers, and pasta made with whole wheat or whole grains (such as whole oats)    · [employer], wild rice, quinoa, kasha, and barley    · All fresh fruit with skin, except banana and melons     · Dried fruits and fruit juice with pulp    · Canned pineapple    · Raw vegetables    · Nuts, seeds, and popcorn    · Beans, nuts, peas, and lentils    · Tough meats    · Coconut and avocado  What else you should know about a low-fiber diet:  A low-fiber diet can decrease the amount of bowel movements you have  Drink liquids as directed to avoid constipation  Ask how much liquid to drink each day and which liquids are best for you  © 2017 [employer]  Information is for End User's use only and may not be sold, redistributed or otherwise used for commercial purposes  All illustrations and images included in CareNotes® are the copyrighted property of A [employer] M [employer]  or [employer]  The above information is an  only  It is not intended as medical advice for individual conditions or treatments   Talk to your doctor, nurse or pharmacist before following any medical regimen to see if it is safe and effective for you

## 2018-03-03 NOTE — DISCHARGE SUMMARY
Discharge Summary - [address]    Patient Information: Ava Winter 47 y o  female MRN: [id number]  Unit/Bed#: -01 Encounter: [id number]    Discharging Physician / Practitioner: EBEN Esquivel  PCP: Colletta Lines, MD  Admission Date: 2/27/2018  Discharge Date: 03/03/18    Reason for Admission:  Lower abdominal pain    Diagnosis at discharge:  Ischemic colitis    Discharge Diagnoses:     Principal Problem:    Colitis  Active Problems:    History of TIAs  Resolved Problems:    * No resolved hospital problems  *      Consultations During Hospital Stay:  · GI  · Vascular surgery    Procedures Performed:     · None    Significant Findings / Test Results:     · CT abdomen pelvis with contrast:  Colitis of the descending colon  · Vascular celiac and or mesenteric duplex: The aorta is patent and normal in caliber  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  The superior mesenteric artery is normal and patent in a fasting stateThere is a >70% stenosis in the inferior mesenteric artery  · C diff negative negative  · Stool panel, bacterial: negative      Incidental Findings:   · none    Test Results Pending at Discharge (will require follow up): · None      Outpatient Tests Requested:  · Recommend outpatient follow-up with GI in 2 weeks  · Recommend outpatient follow-up with PCP  · Recommend follow-up with vascular surgery, should have repeat mesenteric duplex done in 1-2 months  Complications:  None    Hospital Course:     Ava Winter is a 47 y o  female patient with a past medical history of TIAs, diverticulitis who originally presented to the hospital on 2/27/2018 due to lower abdominal pain  Patient states that she was feeling well up until 12:00 p m  on day of admission after eating lunch she developed severe abdominal pain which came on sudden  She stated that the pain was worse in her lower abdomen and radiated to the suprapubic area    She stated that she knew check her stool and solid it was in fact bloody  She stated that her 1st bowel movement was loose and then she said they continued to become looser and darker and more foul smelling  She did also admit to fevers and chills that developed shortly after her abdominal pain as well as nausea and vomiting  She has a history of colitis and has been hospitalized once before, but does admit to about 3-4 episodes of colitis per year  She believed on admission that she had been diagnosed with ischemic colitis previously  She was started on intravenous Cipro and Flagyl  She was evaluated by GI  She was kept NPO and her diet was gradually advanced  Her pain resolved  We did order a vascular celiac/mesenteric duplex which did show a greater than 70% since stenosis in the celiac artery  As well as a greater than 70% stenosis in the inferior mesenteric artery  Vascular surgery did evaluate the patient  There were no inpatient interventions necessary however they did recommend outpatient follow-up and repeat duplex in 1-2 months  I did discuss this with the patient yesterday as well as on day of discharge in the presence of her   GI wanted to keep the patient 1 more day however the patient has been tolerating full liquids and had a soft diet this morning  She has passing gas without abdominal pain  She did have a bowel movement which was slightly bloody, but much improved  I did discuss with the patient that she I wanted to keep her 1 more day however she wanted to go home  She will continue with full liquid/soft diet over the next several days and gradually advance to low residue low-fiber diet  This was discussed with patient as well as instructions were given on her discharge instructions  The patient will be discharged home on remaining course of Flagyl and Cipro to complete 10 days total   She will follow up with GI and her PCP as well as vascular surgery    Discussed plan in depth with both patient and   Condition at Discharge: stable     Discharge Day Visit / Exam:     Subjective:  Patient offers no complaints  States she is passing gas  Had a small bowel movement today with small amount of blood noted  Denies any abdominal pain  Tolerated soft diet this morning for breakfast   Wants to go home  Vitals: Blood Pressure: 131/80 (03/03/18 0710)  Pulse: 81 (03/03/18 0710)  Temperature: 98 5 °F (36 9 °C) (03/03/18 0710)  Temp Source: Oral (03/03/18 0710)  Respirations: 18 (03/03/18 0710)  Weight - Scale: 93 3 kg (205 lb 11 oz) (02/27/18 1734)  SpO2: 98 % (03/03/18 0710)       Exam:   Physical Exam   Constitutional: She is oriented to person, place, and time  No distress  HENT:   Head: Normocephalic  Eyes: Pupils are equal, round, and reactive to light  Neck: Normal range of motion  Cardiovascular: Normal rate and regular rhythm  No murmur heard  Pulmonary/Chest: Effort normal and breath sounds normal    Abdominal: Soft  Bowel sounds are normal  She exhibits no distension  There is no tenderness  Musculoskeletal: Normal range of motion  She exhibits no edema  Neurological: She is alert and oriented to person, place, and time  Skin: Skin is warm and dry  Psychiatric: She has a normal mood and affect  Nursing note and vitals reviewed  Discussion with Family:  Patient and     Discharge instructions/Information to patient and family:   See after visit summary for information provided to patient and family  Provisions for Follow-Up Care:  See after visit summary for information related to follow-up care and any pertinent home health orders  Disposition:     Home    For Discharges to [facility]:   · Not Applicable to this Patient - Not Applicable to this Patient    Planned Readmission: no     Discharge Statement:  I spent 35 minutes discharging the patient  This time was spent on the day of discharge   I had direct contact with the patient on the day of discharge  Greater than 50% of the total time was spent examining patient, answering all patient questions, arranging and discussing plan of care with patient as well as directly providing post-discharge instructions  Additional time then spent on discharge activities  Discharge Medications:  See after visit summary for reconciled discharge medications provided to patient and family        ** Please Note: This note has been constructed using a voice recognition system **

## 2018-03-03 NOTE — DISCHARGE INSTR - AVS FIRST PAGE
· Please take in easy in regards to diet over the next several days, start with liquids/soft foods and ease into low residue/low fiber diet  Please follow up with GI (Dr Lm Parker) in about 2 weeks  I did include some information about low fiber diet below  · Take entire course of antibiotics (cipro and flagyl)  · Follow up with your PCP as well as a vascular surgeon for repeat imaging in 1-2 months

## 2018-03-06 ENCOUNTER — TELEPHONE (OUTPATIENT)
Dept: GASTROENTEROLOGY | Facility: CLINIC | Age: 53
End: 2018-03-06

## 2018-03-06 NOTE — TELEPHONE ENCOUNTER
Dr Gilman Castleman patient---she mixed up her medication and would like a call back to make sure she is on the right schedule

## 2018-03-12 ENCOUNTER — TRANSCRIBE ORDERS (OUTPATIENT)
Dept: ADMINISTRATIVE | Facility: HOSPITAL | Age: 53
End: 2018-03-12

## 2018-03-12 DIAGNOSIS — K55.1 MESENTERIC ARTERY STENOSIS (HCC): Primary | ICD-10-CM

## 2018-03-14 ENCOUNTER — OFFICE VISIT (OUTPATIENT)
Dept: GASTROENTEROLOGY | Facility: AMBULARY SURGERY CENTER | Age: 53
End: 2018-03-14
Payer: COMMERCIAL

## 2018-03-14 VITALS
DIASTOLIC BLOOD PRESSURE: 66 MMHG | HEIGHT: 64 IN | RESPIRATION RATE: 12 BRPM | BODY MASS INDEX: 34.15 KG/M2 | TEMPERATURE: 98 F | SYSTOLIC BLOOD PRESSURE: 108 MMHG | WEIGHT: 200 LBS | HEART RATE: 86 BPM

## 2018-03-14 DIAGNOSIS — K52.9 COLITIS: ICD-10-CM

## 2018-03-14 DIAGNOSIS — Z12.11 ENCOUNTER FOR SCREENING COLONOSCOPY: Primary | ICD-10-CM

## 2018-03-14 PROCEDURE — 99203 OFFICE O/P NEW LOW 30 MIN: CPT | Performed by: PHYSICIAN ASSISTANT

## 2018-03-14 NOTE — LETTER
March 14, 2018     [address] [place] [address]    Patient: Leah Roberts   YOB: 1965   Date of Visit: 3/14/2018       Dear Dr Roel Cotton:    Thank you for referring Opal Gee to me for evaluation  Below are my notes for this consultation  If you have questions, please do not hesitate to call me  I look forward to following your patient along with you           Sincerely,        Izabella Montenegro PA-C        CC: No Recipients

## 2018-03-14 NOTE — PROGRESS NOTES
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, and the same degrees of stenosis in the inferior mesenteric artery on mesenteric doppler  Vascular surgery saw the patient who did not believe she would benefit from surgical intervention, as she did not have stenosis during her first episode of ischemic colitis  Also, her symptoms did not exhibit chronicity  She reported having bright red blood per rectum that continued even after discharge  She relays that she was on a liquid diet for the first couple days, then advanced herself to soft foods  She now has semi formed stools that are brown  She denies BRBPR, hematochezia or melena  She does have residual left sided abdominal pain that she describes as a burning sensation  This is tolerable and much improved compared to initial presentation  She does endorse chronic history of GI issues since her 25s, one being IBS  Her last colonoscopy was with Dr Isabella Del Toro in 2015  Sigmoid diverticulosis was noted, and a single tubular adenoma in the cecum  Because of poor prep, she was due for a recall colonoscopy in 2016  She also has history of EGD in 2015 showing mild gastritis  She does report family history of colon cancer in distant relatives  Review of Systems:    CONSTITUTIONAL: Denies any fever, chills, or rigors  Good appetite, and no recent weight loss  HEENT: No earache or tinnitus  Denies hearing loss or visual disturbances  CARDIOVASCULAR: No chest pain or palpitations  RESPIRATORY: Denies any cough, hemoptysis, shortness of breath or dyspnea on exertion  GASTROINTESTINAL: As noted in the History of Present Illness  GENITOURINARY: No problems with urination  Denies any hematuria or dysuria  NEUROLOGIC: No dizziness or vertigo, denies headaches  MUSCULOSKELETAL: Denies any muscle or joint pain  SKIN: Denies skin rashes or itching  ENDOCRINE: Denies excessive thirst  Denies intolerance to heat or cold  PSYCHOSOCIAL: Denies depression or anxiety  Denies any recent memory loss  Current Outpatient Prescriptions   Medication Sig Dispense Refill    ASPIRIN 81 PO Take 81 mg by mouth       No current facility-administered medications for this visit  Past Medical History:   Diagnosis Date    Colitis     Diverticulitis      Past Surgical History:   Procedure Laterality Date    CHOLECYSTECTOMY      COLONOSCOPY       Social History     Social History    Marital status: /Civil Union     Spouse name: N/A    Number of children: N/A    Years of education: N/A     Social History Main Topics    Smoking status: Former Smoker    Smokeless tobacco: Never Used    Alcohol use No    Drug use: No    Sexual activity: Yes     Partners: Male     Other Topics Concern    None     Social History Narrative    None           PHYSICAL EXAM:      General Appearance:   Alert and oriented x 3  Cooperative, and in no respiratory distress   HEENT:   Normocephalic, atraumatic, anicteric      Neck:  Supple, symmetrical, trachea midline   Lungs:   Clear to auscultation bilaterally; no rales, rhonchi or wheezing; respirations unlabored    Heart[de-identified]   Regular rate and rhythm; no murmur, rub, or gallop     Abdomen:   Soft, left mid abdomen only tender on deep palpation without guarding or rigidity, non-distended; normal bowel sounds; no masses, no organomegaly    Genitalia:   Deferred    Rectal:   Deferred    Extremities:  No cyanosis, clubbing or edema    Pulses:  2+ and symmetric all extremities    Skin:  Skin color, texture, turgor normal, no rashes or lesions    Lymph nodes:  No palpable cervical or supraclavicular lymphadenopathy        Lab Results:     Results from last 6 Months  Lab Units 03/03/18  0615   WBC Thousand/uL 7 24   HEMOGLOBIN g/dL 12 1   HEMATOCRIT % 37 3   PLATELETS Thousands/uL 242   NEUTROS PCT % 61   LYMPHS PCT % 24   MONOS PCT % 9   EOS PCT % 6       Results from last 6 Months  Lab Units 03/02/18  0459  01/22/18  1124   SODIUM mmol/L 142  < > 143   POTASSIUM mmol/L 3 5  < > 4 4   CHLORIDE mmol/L 109*  < > 107   CO2 mmol/L 24  < > 29   BUN mg/dL 9  < > 19   CREATININE mg/dL 0 73  < > 0 87   CALCIUM mg/dL 7 9*  < > 9 2   TOTAL PROTEIN g/dL  --   --  7 2   BILIRUBIN TOTAL mg/dL  --   --  0 40   ALK PHOS U/L  --   --  88   ALT U/L  --   --  32   AST U/L  --   --  19   GLUCOSE RANDOM mg/dL 81  < >  --    < > = values in this interval not displayed  Results from last 6 Months  Lab Units 02/27/18  1802   INR  0 84*           Imaging Studies: I have personally reviewed pertinent imaging studies  Ct Abdomen Pelvis With Contrast    Result Date: 2/27/2018  Impression: Colitis of the descending colon  Workstation performed: [id number]       ASSESSMENT and PLAN:      1) Descending colitis thought to be ischemic colitis - Patient seems much improved  Fortunately, no hematochezia or BRBPR  She is having 3-4 bowel movements per day, but they are formed  There may be contributing IBS, but she does have inflammation that is healing    - We will plan for colonoscopy in mid April, GoLytley prep ordered with Dr Moreno Owen  -  She is to follow-up with vascular as well  - Follow-up after biopsy results       Follow up in 6 weeks

## 2018-04-05 ENCOUNTER — ANESTHESIA EVENT (OUTPATIENT)
Dept: PERIOP | Facility: AMBULARY SURGERY CENTER | Age: 53
End: 2018-04-05
Payer: COMMERCIAL

## 2018-04-16 ENCOUNTER — ANESTHESIA (OUTPATIENT)
Dept: PERIOP | Facility: AMBULARY SURGERY CENTER | Age: 53
End: 2018-04-16
Payer: COMMERCIAL

## 2018-04-16 ENCOUNTER — HOSPITAL ENCOUNTER (OUTPATIENT)
Facility: AMBULARY SURGERY CENTER | Age: 53
Setting detail: OUTPATIENT SURGERY
Discharge: HOME | End: 2018-04-16
Attending: INTERNAL MEDICINE | Admitting: INTERNAL MEDICINE
Payer: COMMERCIAL

## 2018-04-16 VITALS
BODY MASS INDEX: 34.83 KG/M2 | TEMPERATURE: 97.1 F | HEIGHT: 64 IN | OXYGEN SATURATION: 96 % | WEIGHT: 204 LBS | DIASTOLIC BLOOD PRESSURE: 61 MMHG | RESPIRATION RATE: 18 BRPM | HEART RATE: 81 BPM | SYSTOLIC BLOOD PRESSURE: 136 MMHG

## 2018-04-16 PROCEDURE — 45378 DIAGNOSTIC COLONOSCOPY: CPT | Performed by: INTERNAL MEDICINE

## 2018-04-16 RX ORDER — LIDOCAINE HYDROCHLORIDE 10 MG/ML
INJECTION, SOLUTION INFILTRATION; PERINEURAL AS NEEDED
Status: DISCONTINUED | OUTPATIENT
Start: 2018-04-16 | End: 2018-04-16 | Stop reason: SURG

## 2018-04-16 RX ORDER — PROPOFOL 10 MG/ML
INJECTION, EMULSION INTRAVENOUS AS NEEDED
Status: DISCONTINUED | OUTPATIENT
Start: 2018-04-16 | End: 2018-04-16 | Stop reason: SURG

## 2018-04-16 RX ORDER — SODIUM CHLORIDE 9 MG/ML
125 INJECTION, SOLUTION INTRAVENOUS CONTINUOUS
Status: DISCONTINUED | OUTPATIENT
Start: 2018-04-16 | End: 2018-04-16 | Stop reason: HOSPADM

## 2018-04-16 RX ADMIN — SODIUM CHLORIDE: 0.9 INJECTION, SOLUTION INTRAVENOUS at 09:23

## 2018-04-16 RX ADMIN — LIDOCAINE HYDROCHLORIDE 50 MG: 10 INJECTION, SOLUTION INFILTRATION; PERINEURAL at 09:35

## 2018-04-16 RX ADMIN — PROPOFOL 120 MG: 10 INJECTION, EMULSION INTRAVENOUS at 09:35

## 2018-04-16 RX ADMIN — PROPOFOL 30 MG: 10 INJECTION, EMULSION INTRAVENOUS at 09:37

## 2018-04-16 NOTE — H&P
History and Physical -  [facility]  Enrique Walters 47 y o  female MRN: [id number]        HPI:  15-year-old female with history of ischemic colitis in the descending colon    Historical Information   Past Medical History:   Diagnosis Date    Colitis     Diverticulitis     Stroke [facility])     unknown origin of 3 "mini strokes"  2008, 2012, [address]      Past Surgical History:   Procedure Laterality Date    ADENOIDECTOMY      CHOLECYSTECTOMY      COLONOSCOPY      DILATION AND CURETTAGE OF UTERUS      TONSILLECTOMY       Social History   History   Alcohol Use No     History   Drug Use No     History   Smoking Status    Former Smoker   Smokeless Tobacco    Never Used     History reviewed  No pertinent family history  Meds/Allergies     Prescriptions Prior to Admission   Medication    ASPIRIN 81 PO       No Known Allergies    Objective     Blood pressure 119/75, pulse 91, temperature 97 7 °F (36 5 °C), temperature source Temporal, resp  rate 18, height 5' 4" (1 626 m), weight 92 5 kg (204 lb), SpO2 96 %      PHYSICAL EXAM:    Gen: NAD  CV: S1 & S2 normal, RRR  CHEST: Clear to auscultate  ABD: soft, NT/ND, good bowel sounds  EXT: no edema    ASSESSMENT:      history of colitis    PLAN:     colonoscopy

## 2018-04-16 NOTE — OP NOTE
COLONOSCOPY    PROCEDURE: Colonoscopy    INDICATIONS: Hx ischemic colitis    POST-OP DIAGNOSIS: See the impression below    SEDATION: Monitored anesthesia care, check anesthesia records    PHYSICAL EXAM:    /75   Pulse 91   Temp 97 7 °F (36 5 °C) (Temporal)   Resp 18   Ht 5' 4" (1 626 m)   Wt 92 5 kg (204 lb)   SpO2 96%   BMI 35 02 kg/m²   Body mass index is 35 02 kg/m²  General: NAD  Heart: S1 & S2 normal, RRR  Lungs: CTA, No rales or rhonchi  Abdomen: Soft, nontender, nondistended, good bowel sounds    CONSENT:  Informed consent was obtained for the procedure, including sedation after explaining the risks and benefits of the procedure  Risks including but not limited to bleeding, perforation, infection, aspiration were discussed in detail  Also explained about less than 100%$ sensitivity with the exam and other alternatives  PREPARATION:   EKG tracing, pulse oximetry, blood pressure were monitored throughout the procedure  Patient was identified by myself both verbally and by visual inspection of ID band  DESCRIPTION:   Patient was placed in the left lateral decubitus position and was sedated with the above medication  Digital rectal examination was performed  The colonoscope was introduced in to the anal canal and advanced up to cecum, which was identified by the appendiceal orifice and IC valve  A careful inspection was made as the colonoscope was withdrawn, including a retroflexed view of the rectum; findings and interventions are described below  Appropriate photodocumentation was obtained  The quality of the colonic preparation was poor  FINDINGS:     1  Cecum and ileocecal valve -normal mucosa     2  There is large amount of thick liquid stool throughout with more solid stool in the sigmoid colon area  The visible mucosa appeared to be normal without any evidence of colitis  3  Sigmoid diverticulosis     4   Small internal hemorrhoids         IMPRESSIONS:       as above    RECOMMENDATIONS:     rescheduled for colonoscopy with  GoLYTELY and magnesium citrate prep    COMPLICATIONS:  None; patient tolerated the procedure well      DISPOSITION: PACU           CONDITION: Stable

## 2018-04-16 NOTE — ANESTHESIA POSTPROCEDURE EVALUATION
Post-Op Assessment Note      CV Status:  Stable    Mental Status:  Alert and awake    Hydration Status:  Euvolemic    PONV Controlled:  Controlled    Airway Patency:  Patent    Post Op Vitals Reviewed: Yes          Staff: CRNA           /55 (04/16/18 0946)    Temp      Pulse 78 (04/16/18 0946)   Resp 16 (04/16/18 0946)    SpO2 97 % (04/16/18 0946)

## 2018-04-16 NOTE — ANESTHESIA PREPROCEDURE EVALUATION
Review of Systems/Medical History  Patient summary reviewed  Chart reviewed  No history of anesthetic complications     Cardiovascular  Negative cardio ROS    Pulmonary  Negative pulmonary ROS        GI/Hepatic            Endo/Other     GYN       Hematology   Musculoskeletal       Neurology    CVA , no residual symptoms,    Psychology           Physical Exam    Airway    Mallampati score: II  TM Distance: >3 FB  Neck ROM: full     Dental       Cardiovascular  Comment: Negative ROS,     Pulmonary      Other Findings        Anesthesia Plan  ASA Score- 2     Anesthesia Type- IV sedation with anesthesia with ASA Monitors  Additional Monitors:   Airway Plan:         Plan Factors-    Induction- intravenous  Postoperative Plan-     Informed Consent- Anesthetic plan and risks discussed with patient  I personally reviewed this patient with the CRNA  Discussed and agreed on the Anesthesia Plan with the CRNA  Daniel Doe

## 2018-04-16 NOTE — DISCHARGE INSTRUCTIONS
Colonoscopy   WHAT YOU NEED TO KNOW:   A colonoscopy is a procedure to examine the inside of your colon (intestine) with a scope  Polyps or tissue growths may have been removed during your colonoscopy  It is normal to feel bloated and to have some abdominal discomfort  You should be passing gas  If you have hemorrhoids or you had polyps removed, you may have a small amount of bleeding  DISCHARGE INSTRUCTIONS:   Seek care immediately if:   · You have a large amount of bright red blood in your bowel movements  · Your abdomen is hard and firm and you have severe pain  · You have sudden trouble breathing  Contact your healthcare provider if:   · You develop a rash or hives  · You have a fever within 24 hours of your procedure  · You have not had a bowel movement for 3 days after your procedure  · You have questions or concerns about your condition or care  Activity:   · Do not lift, strain, or run  for 3 days after your procedure  · Rest after your procedure  You have been given medicine to relax you  Do not  drive or make important decisions until the day after your procedure  Return to your normal activity as directed  · Relieve gas and discomfort from bloating  by lying on your right side with a heating pad on your abdomen  You may need to take short walks to help the gas move out  Eat small meals until bloating is relieved  If you had polyps removed: For 7 days after your procedure:  · Do not  take aspirin  · Do not  go on long car rides  Help prevent constipation:   · Eat a variety of healthy foods  Healthy foods include fruit, vegetables, whole-grain breads, low-fat dairy products, beans, lean meat, and fish  Ask if you need to be on a special diet  Your healthcare provider may recommend that you eat high-fiber foods such as cooked beans  Fiber helps you have regular bowel movements  · Drink liquids as directed    Adults should drink between 9 and 13 eight-ounce cups of liquid every day  Ask what amount is best for you  For most people, good liquids to drink are water, juice, and milk  · Exercise as directed  Talk to your healthcare provider about the best exercise plan for you  Exercise can help prevent constipation, decrease your blood pressure and improve your health  Follow up with your healthcare provider as directed:  Write down your questions so you remember to ask them during your visits  © 2017 [employer] Information is for End User's use only and may not be sold, redistributed or otherwise used for commercial purposes  All illustrations and images included in CareNotes® are the copyrighted property of [employer]  or [employer]  The above information is an  only  It is not intended as medical advice for individual conditions or treatments  Talk to your doctor, nurse or pharmacist before following any medical regimen to see if it is safe and effective for you

## 2018-04-23 ENCOUNTER — HOSPITAL ENCOUNTER (OUTPATIENT)
Dept: NON INVASIVE DIAGNOSTICS | Facility: CLINIC | Age: 53
Discharge: HOME | End: 2018-04-23
Payer: COMMERCIAL

## 2018-04-23 DIAGNOSIS — K55.1 MESENTERIC ARTERY STENOSIS (HCC): ICD-10-CM

## 2018-04-23 PROCEDURE — 93975 VASCULAR STUDY: CPT

## 2018-04-26 PROCEDURE — 93975 VASCULAR STUDY: CPT | Performed by: SURGERY

## 2018-10-24 ENCOUNTER — TELEPHONE (OUTPATIENT)
Dept: GASTROENTEROLOGY | Facility: MEDICAL CENTER | Age: 53
End: 2018-10-24

## 2018-10-24 NOTE — TELEPHONE ENCOUNTER
Dr Lilian Trejo pt called wanting to know if it is ok for her to take anti-inflammatory medication with her having colitis  Pt states she fell at work and they are suggesting she take it   Pt would like to be called to please advise and can be reached at [phone]

## 2019-04-03 ENCOUNTER — TELEPHONE (OUTPATIENT)
Dept: ADMINISTRATIVE | Facility: HOSPITAL | Age: 54
End: 2019-04-03

## 2019-04-03 DIAGNOSIS — K55.1 CHRONIC VASCULAR INSUFFICIENCY OF INTESTINE (HCC): Primary | ICD-10-CM

## 2019-05-03 ENCOUNTER — HOSPITAL ENCOUNTER (OUTPATIENT)
Dept: NON INVASIVE DIAGNOSTICS | Facility: CLINIC | Age: 54
Discharge: HOME | End: 2019-05-03
Payer: COMMERCIAL

## 2019-05-03 DIAGNOSIS — K55.1 CHRONIC VASCULAR INSUFFICIENCY OF INTESTINE (HCC): ICD-10-CM

## 2019-05-03 PROCEDURE — 93975 VASCULAR STUDY: CPT

## 2019-05-06 PROCEDURE — 93975 VASCULAR STUDY: CPT | Performed by: SURGERY

## 2019-05-10 ENCOUNTER — TRANSCRIBE ORDERS (OUTPATIENT)
Dept: VASCULAR SURGERY | Facility: CLINIC | Age: 54
End: 2019-05-10

## 2019-05-10 ENCOUNTER — OFFICE VISIT (OUTPATIENT)
Dept: VASCULAR SURGERY | Facility: CLINIC | Age: 54
End: 2019-05-10
Payer: COMMERCIAL

## 2019-05-10 VITALS
TEMPERATURE: 99.3 F | WEIGHT: 224 LBS | HEART RATE: 121 BPM | SYSTOLIC BLOOD PRESSURE: 126 MMHG | BODY MASS INDEX: 38.45 KG/M2 | DIASTOLIC BLOOD PRESSURE: 82 MMHG

## 2019-05-10 DIAGNOSIS — I87.2 VENOUS INSUFFICIENCY (CHRONIC) (PERIPHERAL): ICD-10-CM

## 2019-05-10 DIAGNOSIS — I87.2 VENOUS INSUFFICIENCY (CHRONIC) (PERIPHERAL): Primary | ICD-10-CM

## 2019-05-10 DIAGNOSIS — K55.9 VASCULAR DISORDER OF INTESTINE (HCC): ICD-10-CM

## 2019-05-10 DIAGNOSIS — K55.9 VASCULAR INSUFFICIENCY OF INTESTINE (HCC): Primary | ICD-10-CM

## 2019-05-10 PROCEDURE — 99244 OFF/OP CNSLTJ NEW/EST MOD 40: CPT | Performed by: NURSE PRACTITIONER

## 2019-05-10 RX ORDER — AMOXICILLIN 500 MG/1
TABLET, FILM COATED ORAL
Refills: 0 | COMMUNITY
Start: 2019-04-12 | End: 2020-11-23

## 2019-05-10 RX ORDER — IBUPROFEN 800 MG/1
TABLET ORAL
Refills: 0 | COMMUNITY
Start: 2019-04-12 | End: 2021-12-22 | Stop reason: ALTCHOICE

## 2019-10-07 ENCOUNTER — APPOINTMENT (OUTPATIENT)
Dept: RADIOLOGY | Facility: CLINIC | Age: 54
End: 2019-10-07
Payer: COMMERCIAL

## 2019-10-07 ENCOUNTER — TRANSCRIBE ORDERS (OUTPATIENT)
Dept: URGENT CARE | Facility: CLINIC | Age: 54
End: 2019-10-07

## 2019-10-07 DIAGNOSIS — M79.672 LEFT FOOT PAIN: ICD-10-CM

## 2019-10-07 DIAGNOSIS — M79.672 LEFT FOOT PAIN: Primary | ICD-10-CM

## 2019-10-07 PROCEDURE — 73630 X-RAY EXAM OF FOOT: CPT

## 2019-10-17 ENCOUNTER — TRANSCRIBE ORDERS (OUTPATIENT)
Dept: ADMINISTRATIVE | Facility: HOSPITAL | Age: 54
End: 2019-10-17

## 2019-10-17 DIAGNOSIS — Z12.31 SCREENING MAMMOGRAM FOR HIGH-RISK PATIENT: Primary | ICD-10-CM

## 2019-10-18 ENCOUNTER — HOSPITAL ENCOUNTER (OUTPATIENT)
Dept: RADIOLOGY | Age: 54
Discharge: HOME | End: 2019-10-18
Payer: COMMERCIAL

## 2019-10-18 VITALS — HEIGHT: 64 IN | WEIGHT: 230 LBS | BODY MASS INDEX: 39.27 KG/M2

## 2019-10-18 DIAGNOSIS — Z12.31 SCREENING MAMMOGRAM FOR HIGH-RISK PATIENT: ICD-10-CM

## 2019-10-18 PROCEDURE — 77067 SCR MAMMO BI INCL CAD: CPT

## 2020-01-27 ENCOUNTER — TRANSCRIBE ORDERS (OUTPATIENT)
Dept: ADMINISTRATIVE | Facility: HOSPITAL | Age: 55
End: 2020-01-27

## 2020-01-27 ENCOUNTER — HOSPITAL ENCOUNTER (OUTPATIENT)
Dept: CT IMAGING | Facility: HOSPITAL | Age: 55
Discharge: HOME | End: 2020-01-27
Payer: COMMERCIAL

## 2020-01-27 DIAGNOSIS — G89.29 OTHER CHRONIC BACK PAIN: ICD-10-CM

## 2020-01-27 DIAGNOSIS — M54.9 OTHER CHRONIC BACK PAIN: Primary | ICD-10-CM

## 2020-01-27 DIAGNOSIS — G89.29 OTHER CHRONIC BACK PAIN: Primary | ICD-10-CM

## 2020-01-27 DIAGNOSIS — M54.9 OTHER CHRONIC BACK PAIN: ICD-10-CM

## 2020-01-27 PROCEDURE — 74176 CT ABD & PELVIS W/O CONTRAST: CPT

## 2020-11-23 ENCOUNTER — HOSPITAL ENCOUNTER (EMERGENCY)
Facility: HOSPITAL | Age: 55
Discharge: HOME | End: 2020-11-23
Attending: EMERGENCY MEDICINE
Payer: COMMERCIAL

## 2020-11-23 ENCOUNTER — APPOINTMENT (EMERGENCY)
Dept: RADIOLOGY | Facility: HOSPITAL | Age: 55
End: 2020-11-23
Payer: COMMERCIAL

## 2020-11-23 ENCOUNTER — APPOINTMENT (EMERGENCY)
Dept: CT IMAGING | Facility: HOSPITAL | Age: 55
End: 2020-11-23
Payer: COMMERCIAL

## 2020-11-23 VITALS
SYSTOLIC BLOOD PRESSURE: 110 MMHG | HEIGHT: 64 IN | DIASTOLIC BLOOD PRESSURE: 58 MMHG | BODY MASS INDEX: 39.48 KG/M2 | RESPIRATION RATE: 18 BRPM | OXYGEN SATURATION: 94 % | HEART RATE: 82 BPM | TEMPERATURE: 99 F

## 2020-11-23 DIAGNOSIS — R10.9 FLANK PAIN: Primary | ICD-10-CM

## 2020-11-23 DIAGNOSIS — N12 PYELONEPHRITIS OF LEFT KIDNEY: ICD-10-CM

## 2020-11-23 DIAGNOSIS — N39.0 UTI (URINARY TRACT INFECTION): ICD-10-CM

## 2020-11-23 LAB
ALBUMIN SERPL BCP-MCNC: 3.1 G/DL (ref 3.5–5)
ALP SERPL-CCNC: 160 U/L (ref 46–116)
ALT SERPL W P-5'-P-CCNC: 84 U/L (ref 12–78)
ANION GAP SERPL CALCULATED.3IONS-SCNC: 12 MMOL/L (ref 4–13)
AST SERPL W P-5'-P-CCNC: 42 U/L (ref 5–45)
BACTERIA UR QL AUTO: ABNORMAL /HPF
BASOPHILS # BLD AUTO: 0.04 THOUSANDS/ΜL (ref 0–0.1)
BASOPHILS NFR BLD AUTO: 0 % (ref 0–1)
BILIRUB SERPL-MCNC: 0.48 MG/DL (ref 0.2–1)
BILIRUB UR QL STRIP: ABNORMAL
BUN SERPL-MCNC: 15 MG/DL (ref 5–25)
CALCIUM ALBUM COR SERPL-MCNC: 9.8 MG/DL (ref 8.3–10.1)
CALCIUM SERPL-MCNC: 9.1 MG/DL (ref 8.3–10.1)
CHLORIDE SERPL-SCNC: 101 MMOL/L (ref 100–108)
CLARITY UR: CLEAR
CO2 SERPL-SCNC: 21 MMOL/L (ref 21–32)
COLOR UR: YELLOW
CREAT SERPL-MCNC: 0.91 MG/DL (ref 0.6–1.3)
EOSINOPHIL # BLD AUTO: 0.07 THOUSAND/ΜL (ref 0–0.61)
EOSINOPHIL NFR BLD AUTO: 1 % (ref 0–6)
ERYTHROCYTE [DISTWIDTH] IN BLOOD BY AUTOMATED COUNT: 14 % (ref 11.6–15.1)
GFR SERPL CREATININE-BSD FRML MDRD: 72 ML/MIN/1.73SQ M
GLUCOSE SERPL-MCNC: 113 MG/DL (ref 65–140)
GLUCOSE UR STRIP-MCNC: NEGATIVE MG/DL
HCT VFR BLD AUTO: 40.6 % (ref 34.8–46.1)
HGB BLD-MCNC: 13.1 G/DL (ref 11.5–15.4)
HGB UR QL STRIP.AUTO: ABNORMAL
HOLD SPECIMEN: NORMAL
IMM GRANULOCYTES # BLD AUTO: 0.04 THOUSAND/UL (ref 0–0.2)
IMM GRANULOCYTES NFR BLD AUTO: 0 % (ref 0–2)
KETONES UR STRIP-MCNC: NEGATIVE MG/DL
LACTATE SERPL-SCNC: 1.1 MMOL/L (ref 0.5–2)
LEUKOCYTE ESTERASE UR QL STRIP: ABNORMAL
LYMPHOCYTES # BLD AUTO: 2.45 THOUSANDS/ΜL (ref 0.6–4.47)
LYMPHOCYTES NFR BLD AUTO: 20 % (ref 14–44)
MCH RBC QN AUTO: 30.3 PG (ref 26.8–34.3)
MCHC RBC AUTO-ENTMCNC: 32.3 G/DL (ref 31.4–37.4)
MCV RBC AUTO: 94 FL (ref 82–98)
MONOCYTES # BLD AUTO: 1.64 THOUSAND/ΜL (ref 0.17–1.22)
MONOCYTES NFR BLD AUTO: 13 % (ref 4–12)
MUCOUS THREADS UR QL AUTO: ABNORMAL
NEUTROPHILS # BLD AUTO: 8.03 THOUSANDS/ΜL (ref 1.85–7.62)
NEUTS SEG NFR BLD AUTO: 66 % (ref 43–75)
NITRITE UR QL STRIP: POSITIVE
NON-SQ EPI CELLS URNS QL MICRO: ABNORMAL /HPF
NRBC BLD AUTO-RTO: 0 /100 WBCS
PH UR STRIP.AUTO: 5.5 [PH]
PLATELET # BLD AUTO: 284 THOUSANDS/UL (ref 149–390)
PMV BLD AUTO: 9.4 FL (ref 8.9–12.7)
POTASSIUM SERPL-SCNC: 3.9 MMOL/L (ref 3.5–5.3)
PROT SERPL-MCNC: 7.8 G/DL (ref 6.4–8.2)
PROT UR STRIP-MCNC: ABNORMAL MG/DL
RBC # BLD AUTO: 4.33 MILLION/UL (ref 3.81–5.12)
RBC #/AREA URNS AUTO: ABNORMAL /HPF
SODIUM SERPL-SCNC: 134 MMOL/L (ref 136–145)
SP GR UR STRIP.AUTO: 1.02 (ref 1–1.03)
UROBILINOGEN UR QL STRIP.AUTO: 0.2 E.U./DL
WBC # BLD AUTO: 12.27 THOUSAND/UL (ref 4.31–10.16)
WBC #/AREA URNS AUTO: ABNORMAL /HPF

## 2020-11-23 PROCEDURE — 83605 ASSAY OF LACTIC ACID: CPT | Performed by: EMERGENCY MEDICINE

## 2020-11-23 PROCEDURE — 81001 URINALYSIS AUTO W/SCOPE: CPT | Performed by: EMERGENCY MEDICINE

## 2020-11-23 PROCEDURE — 87077 CULTURE AEROBIC IDENTIFY: CPT | Performed by: EMERGENCY MEDICINE

## 2020-11-23 PROCEDURE — 36415 COLL VENOUS BLD VENIPUNCTURE: CPT

## 2020-11-23 PROCEDURE — 80053 COMPREHEN METABOLIC PANEL: CPT | Performed by: EMERGENCY MEDICINE

## 2020-11-23 PROCEDURE — 87186 SC STD MICRODIL/AGAR DIL: CPT | Performed by: EMERGENCY MEDICINE

## 2020-11-23 PROCEDURE — 74177 CT ABD & PELVIS W/CONTRAST: CPT

## 2020-11-23 PROCEDURE — 96366 THER/PROPH/DIAG IV INF ADDON: CPT

## 2020-11-23 PROCEDURE — 71046 X-RAY EXAM CHEST 2 VIEWS: CPT

## 2020-11-23 PROCEDURE — 85025 COMPLETE CBC W/AUTO DIFF WBC: CPT | Performed by: EMERGENCY MEDICINE

## 2020-11-23 PROCEDURE — 99284 EMERGENCY DEPT VISIT MOD MDM: CPT | Performed by: EMERGENCY MEDICINE

## 2020-11-23 PROCEDURE — 96365 THER/PROPH/DIAG IV INF INIT: CPT

## 2020-11-23 PROCEDURE — 99284 EMERGENCY DEPT VISIT MOD MDM: CPT

## 2020-11-23 PROCEDURE — 87181 SC STD AGAR DILUTION PER AGT: CPT | Performed by: EMERGENCY MEDICINE

## 2020-11-23 PROCEDURE — 96375 TX/PRO/DX INJ NEW DRUG ADDON: CPT

## 2020-11-23 PROCEDURE — 87086 URINE CULTURE/COLONY COUNT: CPT | Performed by: EMERGENCY MEDICINE

## 2020-11-23 RX ORDER — AMOXICILLIN AND CLAVULANATE POTASSIUM 875; 125 MG/1; MG/1
1 TABLET, FILM COATED ORAL EVERY 12 HOURS
Qty: 28 TABLET | Refills: 0 | Status: SHIPPED | OUTPATIENT
Start: 2020-11-23 | End: 2020-12-07

## 2020-11-23 RX ORDER — KETOROLAC TROMETHAMINE 30 MG/ML
30 INJECTION, SOLUTION INTRAMUSCULAR; INTRAVENOUS ONCE
Status: COMPLETED | OUTPATIENT
Start: 2020-11-23 | End: 2020-11-23

## 2020-11-23 RX ADMIN — CEFTRIAXONE 1000 MG: 10 INJECTION, POWDER, FOR SOLUTION INTRAVENOUS at 12:31

## 2020-11-23 RX ADMIN — KETOROLAC TROMETHAMINE 30 MG: 30 INJECTION, SOLUTION INTRAMUSCULAR at 12:16

## 2020-11-23 RX ADMIN — SODIUM CHLORIDE 1000 ML: 0.9 INJECTION, SOLUTION INTRAVENOUS at 12:11

## 2020-11-23 RX ADMIN — IOHEXOL 100 ML: 350 INJECTION, SOLUTION INTRAVENOUS at 13:20

## 2020-11-26 LAB
BACTERIA UR CULT: ABNORMAL
BACTERIA UR CULT: ABNORMAL

## 2021-01-12 DIAGNOSIS — Z20.828 EXPOSURE TO SARS-ASSOCIATED CORONAVIRUS: ICD-10-CM

## 2021-01-12 DIAGNOSIS — B34.9 VIRAL SYNDROME: ICD-10-CM

## 2021-01-12 PROCEDURE — U0003 INFECTIOUS AGENT DETECTION BY NUCLEIC ACID (DNA OR RNA); SEVERE ACUTE RESPIRATORY SYNDROME CORONAVIRUS 2 (SARS-COV-2) (CORONAVIRUS DISEASE [COVID-19]), AMPLIFIED PROBE TECHNIQUE, MAKING USE OF HIGH THROUGHPUT TECHNOLOGIES AS DESCRIBED BY CMS-2020-01-R: HCPCS | Performed by: INTERNAL MEDICINE

## 2021-01-12 PROCEDURE — U0005 INFEC AGEN DETEC AMPLI PROBE: HCPCS | Performed by: INTERNAL MEDICINE

## 2021-01-13 LAB — SARS-COV-2 RNA SPEC QL NAA+PROBE: DETECTED

## 2021-04-15 ENCOUNTER — IMMUNIZATIONS (OUTPATIENT)
Dept: FAMILY MEDICINE CLINIC | Facility: HOSPITAL | Age: 56
End: 2021-04-15

## 2021-04-15 DIAGNOSIS — Z23 ENCOUNTER FOR IMMUNIZATION: Primary | ICD-10-CM

## 2021-04-15 PROCEDURE — 0001A SARS-COV-2 / COVID-19 MRNA VACCINE (PFIZER-BIONTECH) 30 MCG: CPT

## 2021-04-15 PROCEDURE — 91300 SARS-COV-2 / COVID-19 MRNA VACCINE (PFIZER-BIONTECH) 30 MCG: CPT

## 2021-05-08 ENCOUNTER — APPOINTMENT (OUTPATIENT)
Dept: LAB | Facility: CLINIC | Age: 56
End: 2021-05-08
Payer: COMMERCIAL

## 2021-05-08 ENCOUNTER — TRANSCRIBE ORDERS (OUTPATIENT)
Dept: LAB | Facility: CLINIC | Age: 56
End: 2021-05-08

## 2021-05-08 DIAGNOSIS — E78.5 HYPERLIPIDEMIA, UNSPECIFIED HYPERLIPIDEMIA TYPE: ICD-10-CM

## 2021-05-08 DIAGNOSIS — Z00.00 ROUTINE GENERAL MEDICAL EXAMINATION AT A HEALTH CARE FACILITY: ICD-10-CM

## 2021-05-08 DIAGNOSIS — I51.9 MYXEDEMA HEART DISEASE: Primary | ICD-10-CM

## 2021-05-08 DIAGNOSIS — E03.9 MYXEDEMA HEART DISEASE: Primary | ICD-10-CM

## 2021-05-08 DIAGNOSIS — I51.9 MYXEDEMA HEART DISEASE: ICD-10-CM

## 2021-05-08 DIAGNOSIS — E03.9 MYXEDEMA HEART DISEASE: ICD-10-CM

## 2021-05-08 LAB
ALBUMIN SERPL BCP-MCNC: 3.5 G/DL (ref 3.5–5)
ALP SERPL-CCNC: 86 U/L (ref 46–116)
ALT SERPL W P-5'-P-CCNC: 27 U/L (ref 12–78)
ANION GAP SERPL CALCULATED.3IONS-SCNC: 8 MMOL/L (ref 4–13)
AST SERPL W P-5'-P-CCNC: 13 U/L (ref 5–45)
BASOPHILS # BLD AUTO: 0.06 THOUSANDS/ΜL (ref 0–0.1)
BASOPHILS NFR BLD AUTO: 1 % (ref 0–1)
BILIRUB SERPL-MCNC: 0.41 MG/DL (ref 0.2–1)
BUN SERPL-MCNC: 20 MG/DL (ref 5–25)
CALCIUM SERPL-MCNC: 8.6 MG/DL (ref 8.3–10.1)
CHLORIDE SERPL-SCNC: 105 MMOL/L (ref 100–108)
CHOLEST SERPL-MCNC: 189 MG/DL (ref 50–200)
CO2 SERPL-SCNC: 28 MMOL/L (ref 21–32)
CREAT SERPL-MCNC: 0.83 MG/DL (ref 0.6–1.3)
EOSINOPHIL # BLD AUTO: 0.11 THOUSAND/ΜL (ref 0–0.61)
EOSINOPHIL NFR BLD AUTO: 2 % (ref 0–6)
ERYTHROCYTE [DISTWIDTH] IN BLOOD BY AUTOMATED COUNT: 14.3 % (ref 11.6–15.1)
GFR SERPL CREATININE-BSD FRML MDRD: 80 ML/MIN/1.73SQ M
GLUCOSE P FAST SERPL-MCNC: 96 MG/DL (ref 65–99)
HCT VFR BLD AUTO: 41.7 % (ref 34.8–46.1)
HDLC SERPL-MCNC: 55 MG/DL
HGB BLD-MCNC: 13.1 G/DL (ref 11.5–15.4)
IMM GRANULOCYTES # BLD AUTO: 0.02 THOUSAND/UL (ref 0–0.2)
IMM GRANULOCYTES NFR BLD AUTO: 0 % (ref 0–2)
LDLC SERPL CALC-MCNC: 118 MG/DL (ref 0–100)
LYMPHOCYTES # BLD AUTO: 2.23 THOUSANDS/ΜL (ref 0.6–4.47)
LYMPHOCYTES NFR BLD AUTO: 29 % (ref 14–44)
MCH RBC QN AUTO: 30 PG (ref 26.8–34.3)
MCHC RBC AUTO-ENTMCNC: 31.4 G/DL (ref 31.4–37.4)
MCV RBC AUTO: 95 FL (ref 82–98)
MONOCYTES # BLD AUTO: 0.69 THOUSAND/ΜL (ref 0.17–1.22)
MONOCYTES NFR BLD AUTO: 9 % (ref 4–12)
NEUTROPHILS # BLD AUTO: 4.47 THOUSANDS/ΜL (ref 1.85–7.62)
NEUTS SEG NFR BLD AUTO: 59 % (ref 43–75)
NONHDLC SERPL-MCNC: 134 MG/DL
NRBC BLD AUTO-RTO: 0 /100 WBCS
PLATELET # BLD AUTO: 309 THOUSANDS/UL (ref 149–390)
PMV BLD AUTO: 9.1 FL (ref 8.9–12.7)
POTASSIUM SERPL-SCNC: 4.1 MMOL/L (ref 3.5–5.3)
PROT SERPL-MCNC: 7.3 G/DL (ref 6.4–8.2)
RBC # BLD AUTO: 4.37 MILLION/UL (ref 3.81–5.12)
SODIUM SERPL-SCNC: 141 MMOL/L (ref 136–145)
T4 SERPL-MCNC: 7.8 UG/DL (ref 4.7–13.3)
TRIGL SERPL-MCNC: 82 MG/DL
TSH SERPL DL<=0.05 MIU/L-ACNC: 0.63 UIU/ML (ref 0.36–3.74)
WBC # BLD AUTO: 7.58 THOUSAND/UL (ref 4.31–10.16)

## 2021-05-08 PROCEDURE — 80061 LIPID PANEL: CPT

## 2021-05-08 PROCEDURE — 84443 ASSAY THYROID STIM HORMONE: CPT

## 2021-05-08 PROCEDURE — 36415 COLL VENOUS BLD VENIPUNCTURE: CPT

## 2021-05-08 PROCEDURE — 84436 ASSAY OF TOTAL THYROXINE: CPT

## 2021-05-08 PROCEDURE — 85025 COMPLETE CBC W/AUTO DIFF WBC: CPT

## 2021-05-08 PROCEDURE — 80053 COMPREHEN METABOLIC PANEL: CPT

## 2021-05-09 ENCOUNTER — IMMUNIZATIONS (OUTPATIENT)
Dept: FAMILY MEDICINE CLINIC | Facility: HOSPITAL | Age: 56
End: 2021-05-09

## 2021-05-09 DIAGNOSIS — Z23 ENCOUNTER FOR IMMUNIZATION: Primary | ICD-10-CM

## 2021-05-09 PROCEDURE — 91300 SARS-COV-2 / COVID-19 MRNA VACCINE (PFIZER-BIONTECH) 30 MCG: CPT

## 2021-05-09 PROCEDURE — 0002A SARS-COV-2 / COVID-19 MRNA VACCINE (PFIZER-BIONTECH) 30 MCG: CPT

## 2021-06-07 ENCOUNTER — TRANSCRIBE ORDERS (OUTPATIENT)
Dept: ADMINISTRATIVE | Facility: HOSPITAL | Age: 56
End: 2021-06-07

## 2021-06-07 DIAGNOSIS — R42 DIZZINESS AND GIDDINESS: Primary | ICD-10-CM

## 2021-06-11 ENCOUNTER — HOSPITAL ENCOUNTER (OUTPATIENT)
Dept: MRI IMAGING | Facility: HOSPITAL | Age: 56
Discharge: HOME | End: 2021-06-11
Payer: COMMERCIAL

## 2021-06-11 DIAGNOSIS — R42 DIZZINESS AND GIDDINESS: ICD-10-CM

## 2021-06-11 PROCEDURE — 70551 MRI BRAIN STEM W/O DYE: CPT

## 2021-06-11 PROCEDURE — G1004 CDSM NDSC: HCPCS

## 2021-06-21 ENCOUNTER — TELEPHONE (OUTPATIENT)
Dept: NEUROLOGY | Facility: CLINIC | Age: 56
End: 2021-06-21

## 2021-06-21 NOTE — TELEPHONE ENCOUNTER
Left Message schedule new patient with General Neuro Per Ryan Cm    Self Referral / Dizziness/Empty Sella Syndrome / [facility]

## 2021-06-21 NOTE — TELEPHONE ENCOUNTER
Patient's daughter called back and I explained that per our Physician Assistant Mel Dey to schedule the patient with general   Her daughter said she doesn't know if will schedule with us because I told her (per the PA) that it's general Neurology  Her daughter said she will talk to her mom and possibly call back, or schedule elsewhere

## 2021-07-13 NOTE — TELEPHONE ENCOUNTER
Best contact number for patient:  [phone]  Emergency Contact name and number:  Tacho Rivera [phone]  Referring provider and telephone number:  Cristal August [phone]  Primary Care Provider Name and if affiliated with [facility]:   Cristal August Yes  Reason for Appointment/Dx:  dizziness  Have you seen and followed up with a pediatric Neurologist for this disease in the past?      No (If yes ok to schedule with Dr Campos Gant)    Neurology Location patient would like to be seen:  Any  Order received? Yes                                                 Records Received? Yes    Have you ever seen another Neurologist?       No    Insurance Information    Insurance Name:[facility] BS     ID/Policy #:[id number]    Secondary Insurance:    ID/Policy#: Workman's Comp/ Accident/ School  Information      Workman's Comp/Accident/School related?        No    If yes name of Insurance company:    Claim #:    Date of Injury:    Type of Injury:     Name and Telephone Number:    Notes:                   Appointment date:   1/11/2021

## 2021-07-27 ENCOUNTER — APPOINTMENT (OUTPATIENT)
Dept: LAB | Facility: CLINIC | Age: 56
End: 2021-07-27
Payer: COMMERCIAL

## 2021-09-27 ENCOUNTER — EVALUATION (OUTPATIENT)
Dept: PHYSICAL THERAPY | Facility: CLINIC | Age: 56
End: 2021-09-27
Payer: COMMERCIAL

## 2021-09-27 DIAGNOSIS — R42 VERTIGO: Primary | ICD-10-CM

## 2021-09-27 PROCEDURE — 97162 PT EVAL MOD COMPLEX 30 MIN: CPT | Performed by: PHYSICAL THERAPIST

## 2021-09-27 NOTE — LETTER
2021    Alyssa Talbot MD  [address] [address]    Patient: Sukh Gold   YOB: 1965   Date of Visit: 2021     Encounter Diagnosis     ICD-10-CM    1  Vertigo  R42        Dear Dr Carmen Harrell:    Thank you for your recent referral of Sukh Gold  Please review the attached evaluation summary from [facility] recent visit  Please verify that you agree with the plan of care by signing the attached order  If you have any questions or concerns, please do not hesitate to call  I sincerely appreciate the opportunity to share in the care of one of your patients and hope to have another opportunity to work with you in the near future  Sincerely,    Colette Harrison, PT      Referring Provider:      I certify that I have read the below Plan of Care and certify the need for these services furnished under this plan of treatment while under my care  Alyssa Talbot MD  [address] [address]  Via Fax: [phone]          PT Evaluation     Today's date: 2021  Patient name: Sukh Gold  : 1965  MRN: [id number]  Referring provider: Karin Baugh MD  Dx:   Encounter Diagnosis     ICD-10-CM    1  Vertigo  R42                   Assessment  Assessment details: Patient is a 55 y o  female who presents with s/s consistent with BPPV  Patient presents to evaluation with dizziness and decreased tolerance to activity  Occular tracking appears WNL  VOR testing revealed slightly impaired to L side  Positional testing appeared mild on L geotrophic horizontal canal BPPV and attempted one gufani maneuver to correct  Major physical therapy impairments include periodic dizziness with positional movements such as rolling in bed  Discussed risks, benefits, alternatives to treatment, as well as educated  All questions were answered to patient satisfaction       Patient would benefit from skilled PT services to address impairments stated above  These deficits are limiting patient's ability to perform all recreational and ADLs including rolling in bed without having increased dizziness  Patient appears motivated, agrees with the POC, and is a good candidate for skilled PT at this time  Thank you for the referral     Impairments: activity intolerance, lacks appropriate home exercise program and poor posture     Symptom irritability: lowUnderstanding of Dx/Px/POC: good   Prognosis: good    Goals  Impairment Goals:     - In 6 weeks, pt will demonstrate improved ability to get in and out of bed and roll in bed without any reported dizziness  - In 6 weeks, pt will demonstrate even gaze stability on R and L sides      Functional Goals:   - By DC, pt will score a 75% or better on FOTO to demonstrate improved functional level   - By DC, pt will be able to roll in bed without any reported dizziness  - By DC, pt will be able to bend down to the floor without having any increased dizziness t/o   - By DC, pt will demonstrate IND and compliance with HEP for improved carryover at home          Subjective Evaluation    History of Present Illness  Mechanism of injury: Dizziness started a while ago (around Jan 2021) and doctor gave me antibiotics and pseudophine, pt states that her dizziness came up and would come and go for a while, originally she has had fluid in her ear which was contributing to this  Pt states that this most recent time she went to bend forward and started getting dizziness  She states the worst is when she is lying in bed and rolls side to side, she states that her dizziness last around 2-3 seconds at this time; but does state that when this first happened several months ago it was much worse  Pt did take meclizine for a while to help with her dizziness  Pt states that she feels drunk and that the room in spinning  Pt states she has headaches from time to time  Pt did have COVID back in Jan/Feb of 2021   Pt denies any numbness or tingling  Recurrent probem    Quality of life: good    Pain  No pain reported  Current pain ratin  At best pain ratin  At worst pain ratin  Location: dizziness spinning  Relieving factors: medications, relaxation and rest  Aggravating factors: sitting  Progression: no change    Social Support    Employment status: working  Treatments  Previous treatment: medication  Current treatment: medication  Patient Goals  Patient goal: get rid of dizziness        Objective     Concurrent Complaints  Positive for headaches  Negative for nausea/motion sickness, tinnitus, visual change, hearing loss, memory loss, aural fullness, poor concentration and peripheral neuropathy  Neuro Exam:     Dizziness  Positive for disequilibrium and vertigo  Negative for oscillopsia, motion sickness, light-headedness, rocking or swaying, diplopia and floating or swimming  Exacerbating factors  Positive for bending over and rolling in bed  Negative for looking up, walking, turning head, supine to/from sitting, optokinetic movement and walking in busy environment  Symptoms   Duration: 5    Headaches   Patient reports headaches: Yes  Cervical exam   Ligament Laxity Testing   Alar ligament: WNL  Sharp Yamileth:  WNL  Modified VBI   Left: asymptomatic  Right: asymptomatic  Seated posture: forward head posture    Oculomotor exam   Oculomotor ROM: WNL  Resting nystagmus: not present   Gaze holding nystagmus: not present left  and not present right  Smooth pursuits: within normal limits  Head thrust: left abnormal and right abnormal    Positional testing   Kallie-Hallpike   Left posterior canal: WNL  Right posterior canal: WNL  Roll test   Left horizontal canal: geotropic  Right horizontal canal: WNL  Positional testing comment: Will further assess balance              Diagnosis: BPPV suspected L horizontal, vestibular hypofunction   Precautions:    Primary goals: decrease dizziness   Asterisks next to exercises = provided for patient HEP   Manual Therapy                                                Exercise Diary (Neuro Re-ed and There ex)        victoriano to correct L geotrophic horizontal nystagmus WJB 1' hold                                                                                                               education WJB: POC, initial HEP, prognosis       Ther Act                                        Modalities

## 2021-09-27 NOTE — PROGRESS NOTES
PT Evaluation     Today's date: 2021  Patient name: Brock Aguilera  : 1965  MRN: [id number]  Referring provider: Ethel Terry MD  Dx:   Encounter Diagnosis     ICD-10-CM    1  Vertigo  R42                   Assessment  Assessment details: Patient is a 55 y o  female who presents with s/s consistent with BPPV  Patient presents to evaluation with dizziness and decreased tolerance to activity  Occular tracking appears WNL  VOR testing revealed slightly impaired to L side  Positional testing appeared mild on L geotrophic horizontal canal BPPV and attempted one gufani maneuver to correct  Major physical therapy impairments include periodic dizziness with positional movements such as rolling in bed  Discussed risks, benefits, alternatives to treatment, as well as educated  All questions were answered to patient satisfaction  Patient would benefit from skilled PT services to address impairments stated above  These deficits are limiting patient's ability to perform all recreational and ADLs including rolling in bed without having increased dizziness  Patient appears motivated, agrees with the POC, and is a good candidate for skilled PT at this time   Thank you for the referral     Impairments: activity intolerance, lacks appropriate home exercise program and poor posture     Symptom irritability: lowUnderstanding of Dx/Px/POC: good   Prognosis: good    Goals  Impairment Goals:     - In 6 weeks, pt will demonstrate improved ability to get in and out of bed and roll in bed without any reported dizziness  - In 6 weeks, pt will demonstrate even gaze stability on R and L sides      Functional Goals:   - By DC, pt will score a 75% or better on FOTO to demonstrate improved functional level   - By DC, pt will be able to roll in bed without any reported dizziness  - By DC, pt will be able to bend down to the floor without having any increased dizziness t/o   - By DC, pt will demonstrate IND and compliance with HEP for improved carryover at home          Subjective Evaluation    History of Present Illness  Mechanism of injury: Dizziness started a while ago (around 2021) and doctor gave me antibiotics and pseudophine, pt states that her dizziness came up and would come and go for a while, originally she has had fluid in her ear which was contributing to this  Pt states that this most recent time she went to bend forward and started getting dizziness  She states the worst is when she is lying in bed and rolls side to side, she states that her dizziness last around 2-3 seconds at this time; but does state that when this first happened several months ago it was much worse  Pt did take meclizine for a while to help with her dizziness  Pt states that she feels drunk and that the room in spinning  Pt states she has headaches from time to time  Pt did have COVID back in /  Pt denies any numbness or tingling  Recurrent probem    Quality of life: good    Pain  No pain reported  Current pain ratin  At best pain ratin  At worst pain ratin  Location: dizziness spinning  Relieving factors: medications, relaxation and rest  Aggravating factors: sitting  Progression: no change    Social Support    Employment status: working  Treatments  Previous treatment: medication  Current treatment: medication  Patient Goals  Patient goal: get rid of dizziness        Objective     Concurrent Complaints  Positive for headaches  Negative for nausea/motion sickness, tinnitus, visual change, hearing loss, memory loss, aural fullness, poor concentration and peripheral neuropathy  Neuro Exam:     Dizziness  Positive for disequilibrium and vertigo  Negative for oscillopsia, motion sickness, light-headedness, rocking or swaying, diplopia and floating or swimming  Exacerbating factors  Positive for bending over and rolling in bed     Negative for looking up, walking, turning head, supine to/from sitting, optokinetic movement and walking in busy environment  Symptoms   Duration: 5    Headaches   Patient reports headaches: Yes  Cervical exam   Ligament Laxity Testing   Alar ligament: WNL  Sharp Yamileth:  WNL  Modified VBI   Left: asymptomatic  Right: asymptomatic  Seated posture: forward head posture    Oculomotor exam   Oculomotor ROM: WNL  Resting nystagmus: not present   Gaze holding nystagmus: not present left  and not present right  Smooth pursuits: within normal limits  Head thrust: left abnormal and right abnormal    Positional testing   [place]-Hallpike   Left posterior canal: WNL  Right posterior canal: WNL  Roll test   Left horizontal canal: geotropic  Right horizontal canal: WNL  Positional testing comment: Will further assess balance              Diagnosis: BPPV suspected L horizontal, vestibular hypofunction   Precautions:    Primary goals: decrease dizziness   Asterisks next to exercises = provided for patient HEP   Manual Therapy                                                Exercise Diary (Neuro Re-ed and There ex)        gufoni to correct L geotrophic horizontal nystagmus WJB 1' hold                                                                                                               education WJB: POC, initial HEP, prognosis       Ther Act                                        Modalities

## 2021-10-01 ENCOUNTER — OFFICE VISIT (OUTPATIENT)
Dept: PHYSICAL THERAPY | Facility: CLINIC | Age: 56
End: 2021-10-01
Payer: COMMERCIAL

## 2021-10-01 DIAGNOSIS — R42 VERTIGO: Primary | ICD-10-CM

## 2021-10-01 PROCEDURE — 97112 NEUROMUSCULAR REEDUCATION: CPT | Performed by: PHYSICAL THERAPIST

## 2021-10-04 ENCOUNTER — OFFICE VISIT (OUTPATIENT)
Dept: PHYSICAL THERAPY | Facility: CLINIC | Age: 56
End: 2021-10-04
Payer: COMMERCIAL

## 2021-10-04 DIAGNOSIS — R42 VERTIGO: Primary | ICD-10-CM

## 2021-10-04 PROCEDURE — 97112 NEUROMUSCULAR REEDUCATION: CPT | Performed by: PHYSICAL THERAPIST

## 2021-10-07 ENCOUNTER — APPOINTMENT (OUTPATIENT)
Dept: PHYSICAL THERAPY | Facility: CLINIC | Age: 56
End: 2021-10-07
Payer: COMMERCIAL

## 2021-12-22 ENCOUNTER — PROCEDURE VISIT (OUTPATIENT)
Dept: UROLOGY | Facility: CLINIC | Age: 56
End: 2021-12-22
Payer: COMMERCIAL

## 2021-12-22 VITALS
HEIGHT: 64 IN | RESPIRATION RATE: 18 BRPM | SYSTOLIC BLOOD PRESSURE: 138 MMHG | DIASTOLIC BLOOD PRESSURE: 78 MMHG | BODY MASS INDEX: 43.87 KG/M2 | WEIGHT: 257 LBS

## 2021-12-22 DIAGNOSIS — Z71.2 ENCOUNTER TO DISCUSS TEST RESULTS: ICD-10-CM

## 2021-12-22 DIAGNOSIS — K55.9 VASCULAR INSUFFICIENCY OF INTESTINE (HCC): ICD-10-CM

## 2021-12-22 DIAGNOSIS — R31.0 GROSS HEMATURIA: Primary | ICD-10-CM

## 2021-12-22 PROCEDURE — 52000 CYSTOURETHROSCOPY: CPT | Performed by: UROLOGY

## 2021-12-22 PROCEDURE — 88112 CYTOPATH CELL ENHANCE TECH: CPT | Performed by: PATHOLOGY

## 2021-12-22 PROCEDURE — 99204 OFFICE O/P NEW MOD 45 MIN: CPT | Performed by: UROLOGY

## 2021-12-22 RX ORDER — MECLIZINE HYDROCHLORIDE 25 MG/1
25 TABLET ORAL 3 TIMES DAILY PRN
COMMUNITY
Start: 2021-09-17

## 2021-12-24 ENCOUNTER — APPOINTMENT (OUTPATIENT)
Dept: LAB | Facility: CLINIC | Age: 56
End: 2021-12-24
Payer: COMMERCIAL

## 2021-12-24 DIAGNOSIS — R31.0 GROSS HEMATURIA: ICD-10-CM

## 2021-12-24 LAB
ANION GAP SERPL CALCULATED.3IONS-SCNC: 6 MMOL/L (ref 4–13)
BUN SERPL-MCNC: 18 MG/DL (ref 5–25)
CALCIUM SERPL-MCNC: 8.9 MG/DL (ref 8.3–10.1)
CHLORIDE SERPL-SCNC: 104 MMOL/L (ref 100–108)
CO2 SERPL-SCNC: 30 MMOL/L (ref 21–32)
CREAT SERPL-MCNC: 0.85 MG/DL (ref 0.6–1.3)
GFR SERPL CREATININE-BSD FRML MDRD: 77 ML/MIN/1.73SQ M
GLUCOSE P FAST SERPL-MCNC: 102 MG/DL (ref 65–99)
POTASSIUM SERPL-SCNC: 4.3 MMOL/L (ref 3.5–5.3)
SODIUM SERPL-SCNC: 140 MMOL/L (ref 136–145)

## 2021-12-24 PROCEDURE — 36415 COLL VENOUS BLD VENIPUNCTURE: CPT

## 2021-12-24 PROCEDURE — 80048 BASIC METABOLIC PNL TOTAL CA: CPT

## 2021-12-30 ENCOUNTER — HOSPITAL ENCOUNTER (OUTPATIENT)
Dept: RADIOLOGY | Facility: MEDICAL CENTER | Age: 56
Discharge: HOME | End: 2021-12-30
Payer: COMMERCIAL

## 2021-12-30 DIAGNOSIS — R31.0 GROSS HEMATURIA: ICD-10-CM

## 2021-12-30 PROCEDURE — G1004 CDSM NDSC: HCPCS

## 2021-12-30 PROCEDURE — 74178 CT ABD&PLV WO CNTR FLWD CNTR: CPT

## 2021-12-30 RX ADMIN — IOHEXOL 100 ML: 350 INJECTION, SOLUTION INTRAVENOUS at 15:27

## 2022-01-11 ENCOUNTER — OFFICE VISIT (OUTPATIENT)
Dept: NEUROLOGY | Facility: CLINIC | Age: 57
End: 2022-01-11
Payer: COMMERCIAL

## 2022-01-11 VITALS
RESPIRATION RATE: 16 BRPM | HEART RATE: 102 BPM | WEIGHT: 256 LBS | TEMPERATURE: 98.3 F | HEIGHT: 64 IN | SYSTOLIC BLOOD PRESSURE: 120 MMHG | DIASTOLIC BLOOD PRESSURE: 70 MMHG | BODY MASS INDEX: 43.71 KG/M2 | OXYGEN SATURATION: 98 %

## 2022-01-11 DIAGNOSIS — E23.6 EMPTY SELLA (HCC): ICD-10-CM

## 2022-01-11 DIAGNOSIS — R51.9 DAILY HEADACHE: Primary | ICD-10-CM

## 2022-01-11 PROCEDURE — 99205 OFFICE O/P NEW HI 60 MIN: CPT | Performed by: PSYCHIATRY & NEUROLOGY

## 2022-01-11 NOTE — ASSESSMENT & PLAN NOTE
55year old female presenting as new consult for headache  She reports these headaches have been daily over the last year and a half and prior to that time she had no history of headache disorder  These headaches increased in frequency to the level that they are at now  Her typical headache is a 6/10 with the worst being greater than 10/10  She does not take any medications for these headaches she is a  and she is concerned about medication side effects  She has not even taken over-the-counter Tylenol or ibuprofen  She does however take magnesium for muscle cramps  About 6 months ago she had an episode of vertigo for which she was assessed in the ER and subsequently underwent MRI brain without contrast   This MRI demonstrated empty sella  She also reports gaining approximately 100 lb in the last 18 months, in the setting of weight gain, imaging findings, new onset daily headache this is concerning for IIH  She does not report any visual changes or worsening of headache while laying down  On exam she does not have papilledema  We discussed increasing her daily dose of magnesium in adding B2 to start with  We also discussed future options for preventative agents such as propranolol, topiramate, amitriptyline  Would likely prescribed propranolol 1st as she does have a history of symptomatic tachycardia, where as she is concerned about side effects such as cognitive slowing which would preclude topiramate, and daytime somnolence which would preclude amitriptyline  Could also consider short-term medication regimen such as a course of steroids or Depakote to try to break the headache cycle  Will refer her to Ophthalmology for more complete assessment  If she is found to have papilledema by Ophthalmology she will require a lumbar puncture    Have counseled Marlys Aguirre that if she were to start having visual symptoms she should proceed to the ER immediately for lumbar puncture and workup for IIH     Return in about 3 months (around 4/11/2022) for recheck after seeing ophtho

## 2022-01-11 NOTE — PROGRESS NOTES
Patient ID: Erum Shoemaker is a 55 y o  female  Assessment/Plan:    Daily headache  55year old female presenting as new consult for headache  She reports these headaches have been daily over the last year and a half and prior to that time she had no history of headache disorder  These headaches increased in frequency to the level that they are at now  Her typical headache is a 6/10 with the worst being greater than 10/10  She does not take any medications for these headaches she is a  and she is concerned about medication side effects  She has not even taken over-the-counter Tylenol or ibuprofen  She does however take magnesium for muscle cramps  About 6 months ago she had an episode of vertigo for which she was assessed in the ER and subsequently underwent MRI brain without contrast   This MRI demonstrated empty sella  She also reports gaining approximately 100 lb in the last 18 months, in the setting of weight gain, imaging findings, new onset daily headache this is concerning for IIH  She does not report any visual changes or worsening of headache while laying down  On exam she does not have papilledema  We discussed increasing her daily dose of magnesium in adding B2 to start with  We also discussed future options for preventative agents such as propranolol, topiramate, amitriptyline  Would likely prescribed propranolol 1st as she does have a history of symptomatic tachycardia, where as she is concerned about side effects such as cognitive slowing which would preclude topiramate, and daytime somnolence which would preclude amitriptyline  Could also consider short-term medication regimen such as a course of steroids or Depakote to try to break the headache cycle  Will refer her to Ophthalmology for more complete assessment  If she is found to have papilledema by Ophthalmology she will require a lumbar puncture    Have counseled Cecilia Crow that if she were to start having visual symptoms she should proceed to the ER immediately for lumbar puncture and workup for IIH  Return in about 3 months (around 4/11/2022) for recheck after seeing ophtho  Diagnoses and all orders for this visit:    Daily headache  -     Ambulatory Referral to Ophthalmology; Future    Empty sella [facility])  -     Ambulatory Referral to Ophthalmology; Future    Other orders  -     Magnesium 400 MG CAPS  -     Multiple Vitamins-Minerals (CENTRUM SILVER 50+WOMEN PO)           Subjective:    Abebe Servin is a 55 y o  female presenting for daily headache present for the last year and half  Headache initially started gradually and worsened to its present state  She reports this headache is present all day every day and waxes and wanes throughout the day  There is no time of day that it is better or worse  She denies worsening while laying down  She does note that she has gained 100 lb in the last 18 months  She denies any history of visual loss or visual disruption  She works as a  and is very concerned about taking any medications that could impair her driving  As such, she has not tried any medications for her daily headaches including over-the-counter medications such as Tylenol or ibuprofen  She reports her headaches are typically a throbbing feeling over the right central top area of her head with sometimes a stabbing feeling in her right eye  She notes significant light and sound sensitivity and has difficulty watching TV at times  Prior to year and a half ago she did not have headaches  During all this when she was initially referred to Neurology it was for dizziness which has since resolved however as a result of that complaint she did have an MRI brain without contrast which demonstrated empty sella  In the setting of significant weight gain, and the sella, daily headaches this is concerning for IIH    She has been to an eye doctor recently but not and dilated eye exam   She currently takes magnesium for muscle cramps which she noted did help her headaches a bit  She does not want to start a preventative agent at this time and would like to give more time to the magnesium and perhaps add B2 after our discussion today  We discussed possible future options including propranolol versus Topamax versus amitriptyline  Would err on the side of propranolol as she does have symptomatic tachycardia, 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 and she is concerned about being too sleepy during the day which would preclude amitriptyline  Other options include a short-term use of steroids versus Depakote to try to break the headache  She will follow-up in 3 months at which time we can discuss next steps or she will call in if she chooses to make changes to her medication regimen prior to that time  The following portions of the patient's history were reviewed and updated as appropriate: allergies, current medications, past family history, past medical history, past social history, past surgical history and problem list          Objective:    Blood pressure 120/70, pulse 102, temperature 98 3 °F (36 8 °C), temperature source Temporal, resp  rate 16, height 5' 4" (1 626 m), weight 116 kg (256 lb), SpO2 98 %, not currently breastfeeding  Physical Exam  Vitals reviewed  Constitutional:       General: She is not in acute distress  Appearance: She is obese  HENT:      Head: Normocephalic and atraumatic  Mouth/Throat:      Mouth: Mucous membranes are moist    Eyes:      General: Lids are normal       Extraocular Movements: Extraocular movements intact  Conjunctiva/sclera: Conjunctivae normal       Pupils: Pupils are equal, round, and reactive to light  Cardiovascular:      Rate and Rhythm: Tachycardia present  Pulmonary:      Effort: Pulmonary effort is normal  No respiratory distress     Musculoskeletal: General: Normal range of motion  Neurological:      Mental Status: She is alert  Coordination: Coordination is intact  Deep Tendon Reflexes: Strength normal and reflexes are normal and symmetric  Psychiatric:         Mood and Affect: Mood normal          Speech: Speech normal          Behavior: Behavior normal          Thought Content: Thought content normal          Judgment: Judgment normal          Neurological Exam  Mental Status  Alert  Recent and remote memory are intact  Speech is normal  Language is fluent with no aphasia  Attention and concentration are normal  Fund of knowledge is appropriate for level of education  Cranial Nerves  CN II: Visual fields full to confrontation  Right funduscopic exam: disc intact  Left funduscopic exam: disc intact  CN III, IV, VI: Extraocular movements intact bilaterally  Normal lids and orbits bilaterally  Pupils equal round and reactive to light bilaterally  CN V: Facial sensation is normal   CN VII: Full and symmetric facial movement  CN VIII: Hearing is normal   CN IX, X: Palate elevates symmetrically  Normal gag reflex  CN XI: Shoulder shrug strength is normal   CN XII: Tongue midline without atrophy or fasciculations  Motor  Normal muscle bulk throughout  No fasciculations present  Normal muscle tone  No abnormal involuntary movements  Strength is 5/5 throughout all four extremities  Sensory  Sensation is intact to light touch, pinprick, vibration and proprioception in all four extremities  Reflexes  Deep tendon reflexes are 2+ and symmetric in all four extremities with downgoing toes bilaterally  Coordination  Finger-to-nose, rapid alternating movements and heel-to-shin normal bilaterally without dysmetria  Gait  Normal casual, toe, heel and tandem gait  ROS:    Review of Systems   Constitutional: Negative  Negative for appetite change and fever  HENT: Positive for trouble swallowing   Negative for hearing loss, tinnitus and voice change  Eyes: Positive for pain (Right eye)  Negative for photophobia  Respiratory: Positive for shortness of breath  Cardiovascular: Positive for palpitations  Gastrointestinal: Negative  Negative for nausea and vomiting  Endocrine: Negative  Negative for cold intolerance  Genitourinary: Negative  Negative for dysuria, frequency and urgency  Musculoskeletal: Positive for back pain, myalgias and neck pain  Skin: Negative  Negative for rash  Neurological: Positive for tremors, weakness and headaches  Negative for dizziness, seizures, syncope, facial asymmetry, speech difficulty, light-headedness and numbness  Patient stated that she has headaches daily  Patient stated that she has a 4-10 pain today  Hematological: Negative  Does not bruise/bleed easily  Psychiatric/Behavioral: Positive for sleep disturbance  Negative for confusion and hallucinations  Review of systems as documented by the MA was reviewed in full by myself, Ed Roman MD      More than 50% of this time spent with the patient was devoted to counseling and coordination of care  Issues addressed during this clinic visit included overall management, medication counseling or monitoring (including adverse effects, side effects and risks of medications)

## 2022-01-11 NOTE — PATIENT INSTRUCTIONS
Other instructions for headache:  Trial of: Magnesium oxide 250-500 mg daily with food  Caution: diarrhea  Trial of: B complex daily or Vitamin B2 / Riboflavin- 400 mg daily  CoQ10 1000-300 mg daily (can help with vision health and heart health)  Vitamin D 5468-2332 units daily (available over the counter)  Drink at least 64 oz of water daily-- 8 cups   No more than 8 oz of caffeinated products including green tea iced tea black tea coffee energy drinks soda, in 24 hour period  No artificial sweeteners   No MSG/chinese food  Other potential triggers:   Cheese  Chocolate  Peanut butter  Nuts  BBQ foods  Pizza  Nitrates- hot dogs, lunch meats like salami  Phosphates-- look in back of processed foods   sulfates  alcohol  Recommend headache diary such as migraine archana howard to help identify triggers      For a really bad headache you can try tylenol to help relieve the pain

## 2022-02-01 ENCOUNTER — NEW PATIENT (OUTPATIENT)
Dept: URBAN - METROPOLITAN AREA CLINIC 6 | Facility: CLINIC | Age: 57
End: 2022-02-01

## 2022-02-01 DIAGNOSIS — H53.123: ICD-10-CM

## 2022-02-01 DIAGNOSIS — E23.6: ICD-10-CM

## 2022-02-01 PROCEDURE — 99244 OFF/OP CNSLTJ NEW/EST MOD 40: CPT

## 2022-02-01 PROCEDURE — 92133 CPTRZD OPH DX IMG PST SGM ON: CPT

## 2022-02-01 PROCEDURE — 92083 EXTENDED VISUAL FIELD XM: CPT

## 2022-02-01 ASSESSMENT — VISUAL ACUITY
OD_SC: 20/30+1
OS_SC: 20/25
OU_SC: J2

## 2022-02-01 ASSESSMENT — TONOMETRY
OS_IOP_MMHG: 21
OD_IOP_MMHG: 19

## 2022-05-26 ENCOUNTER — OFFICE VISIT (OUTPATIENT)
Dept: FAMILY MEDICINE CLINIC | Facility: CLINIC | Age: 57
End: 2022-05-26

## 2022-05-26 VITALS
TEMPERATURE: 97.7 F | HEART RATE: 117 BPM | HEIGHT: 64 IN | DIASTOLIC BLOOD PRESSURE: 82 MMHG | SYSTOLIC BLOOD PRESSURE: 120 MMHG | WEIGHT: 255 LBS | BODY MASS INDEX: 43.54 KG/M2

## 2022-05-26 DIAGNOSIS — Z02.4 ENCOUNTER FOR CDL (COMMERCIAL DRIVING LICENSE) EXAM: Primary | ICD-10-CM

## 2022-05-26 PROCEDURE — 99499 UNLISTED E&M SERVICE: CPT | Performed by: FAMILY MEDICINE

## 2022-06-07 ENCOUNTER — TELEPHONE (OUTPATIENT)
Dept: UROLOGY | Facility: CLINIC | Age: 57
End: 2022-06-07

## 2022-06-07 ENCOUNTER — APPOINTMENT (OUTPATIENT)
Dept: LAB | Facility: CLINIC | Age: 57
End: 2022-06-07
Payer: COMMERCIAL

## 2022-06-07 DIAGNOSIS — R39.9 UTI SYMPTOMS: Primary | ICD-10-CM

## 2022-06-07 DIAGNOSIS — R39.9 UTI SYMPTOMS: ICD-10-CM

## 2022-06-07 LAB
BACTERIA UR QL AUTO: ABNORMAL /HPF
BILIRUB UR QL STRIP: NEGATIVE
CLARITY UR: CLEAR
COLOR UR: ABNORMAL
GLUCOSE UR STRIP-MCNC: NEGATIVE MG/DL
HGB UR QL STRIP.AUTO: NEGATIVE
KETONES UR STRIP-MCNC: NEGATIVE MG/DL
LEUKOCYTE ESTERASE UR QL STRIP: NEGATIVE
NITRITE UR QL STRIP: NEGATIVE
NON-SQ EPI CELLS URNS QL MICRO: ABNORMAL /HPF
PH UR STRIP.AUTO: 6 [PH]
PROT UR STRIP-MCNC: NEGATIVE MG/DL
RBC #/AREA URNS AUTO: ABNORMAL /HPF
SP GR UR STRIP.AUTO: 1.02 (ref 1–1.03)
UROBILINOGEN UR QL STRIP.AUTO: 0.2 E.U./DL
WBC #/AREA URNS AUTO: ABNORMAL /HPF

## 2022-06-07 PROCEDURE — 87086 URINE CULTURE/COLONY COUNT: CPT

## 2022-06-07 PROCEDURE — 81001 URINALYSIS AUTO W/SCOPE: CPT

## 2022-06-07 NOTE — TELEPHONE ENCOUNTER
PATIENT PREVIOUSLY SEEN BY DR ALSTON:     Patient called in stating they had a UA done through PCP and was told it was positive  Patient stated they received an antibiotic but received no relief from the antibiotic  Patient reports increased dizziness, urinating with cough and sneeze  Patient states they did not urinate with coughs or sneezes prior to "UTI" diagnosed by PCP  Patient is requesting a UA and Culture to insure they had the proper antibiotic  Patient states they took the antibiotics as prescribed and finished the complete course  Patient is also requesting Dr Mya Foster review results and inform patient if they may need any further testing  Patient informed that orders are placed for urine testing and office will monitor for results

## 2022-06-08 LAB — BACTERIA UR CULT: NORMAL

## 2022-06-10 ENCOUNTER — TELEPHONE (OUTPATIENT)
Dept: NEUROLOGY | Facility: CLINIC | Age: 57
End: 2022-06-10

## 2022-06-10 NOTE — TELEPHONE ENCOUNTER
Patient stopped in office  She had an appointment scheduled with Dr Jensen Fox for 6/21/2022 that was cancelled  Patient states she did not cancel appointment  Requesting another appointment to be scheduled  Follow up appointment scheduled 7/5/2022 @ 4:30 pm with Dr Jensen Fox  Appointment card and directions given to patient

## 2022-06-29 ENCOUNTER — TELEPHONE (OUTPATIENT)
Dept: NEUROLOGY | Facility: CLINIC | Age: 57
End: 2022-06-29

## 2022-06-29 NOTE — TELEPHONE ENCOUNTER
Spoke to patient and changed her appointment on 7/5/2022 from 4:30 pm to 4 pm as per Dr Sarah ahmadi

## 2022-07-05 ENCOUNTER — OFFICE VISIT (OUTPATIENT)
Dept: NEUROLOGY | Facility: CLINIC | Age: 57
End: 2022-07-05
Payer: COMMERCIAL

## 2022-07-05 VITALS
TEMPERATURE: 98.5 F | HEIGHT: 64 IN | BODY MASS INDEX: 44.22 KG/M2 | DIASTOLIC BLOOD PRESSURE: 76 MMHG | HEART RATE: 87 BPM | SYSTOLIC BLOOD PRESSURE: 122 MMHG | WEIGHT: 259 LBS

## 2022-07-05 DIAGNOSIS — G57.10 MERALGIA PARAESTHETICA: Primary | ICD-10-CM

## 2022-07-05 DIAGNOSIS — R63.5 WEIGHT GAIN: ICD-10-CM

## 2022-07-05 DIAGNOSIS — M79.604 LEG PAIN, BILATERAL: ICD-10-CM

## 2022-07-05 DIAGNOSIS — M79.605 LEG PAIN, BILATERAL: ICD-10-CM

## 2022-07-05 DIAGNOSIS — R51.9 HEADACHE: ICD-10-CM

## 2022-07-05 PROCEDURE — 99213 OFFICE O/P EST LOW 20 MIN: CPT | Performed by: PSYCHIATRY & NEUROLOGY

## 2022-07-05 NOTE — ASSESSMENT & PLAN NOTE
Burning pain in the bilateral thighs x a few weeks in the setting of 100 lb weight gain over the last 18 months  Works as a , sits for many hours a day  Exam demonstrates diminished to pinprick in circular like pattern on the lateral to top portion of bilateral thighs, right worse than left       Plan:  - Counseled on weight loss, nutrition referral given at request of patient  - Offered medication but was declined, would try trileptal first rather than gabapentin due to weight profile if desired in the future  - EMG 2 limb lower extremity  - B12 level

## 2022-08-19 ENCOUNTER — OFFICE VISIT (OUTPATIENT)
Dept: URGENT CARE | Facility: CLINIC | Age: 57
End: 2022-08-19
Payer: COMMERCIAL

## 2022-08-19 VITALS
RESPIRATION RATE: 16 BRPM | TEMPERATURE: 97.5 F | DIASTOLIC BLOOD PRESSURE: 59 MMHG | OXYGEN SATURATION: 96 % | WEIGHT: 248 LBS | SYSTOLIC BLOOD PRESSURE: 121 MMHG | HEIGHT: 64 IN | BODY MASS INDEX: 42.34 KG/M2 | HEART RATE: 83 BPM

## 2022-08-19 DIAGNOSIS — R42 VERTIGO: ICD-10-CM

## 2022-08-19 DIAGNOSIS — H66.003 ACUTE SUPPURATIVE OTITIS MEDIA OF BOTH EARS WITHOUT SPONTANEOUS RUPTURE OF TYMPANIC MEMBRANES, RECURRENCE NOT SPECIFIED: Primary | ICD-10-CM

## 2022-08-19 PROCEDURE — 99213 OFFICE O/P EST LOW 20 MIN: CPT | Performed by: PHYSICIAN ASSISTANT

## 2022-08-19 RX ORDER — MONTELUKAST SODIUM 10 MG/1
10 TABLET ORAL EVERY EVENING
COMMUNITY
Start: 2022-05-24 | End: 2022-10-19 | Stop reason: ALTCHOICE

## 2022-08-19 RX ORDER — NEOMYCIN SULFATE, POLYMYXIN B SULFATE AND HYDROCORTISONE 10; 3.5; 1 MG/ML; MG/ML; [USP'U]/ML
4 SUSPENSION/ DROPS AURICULAR (OTIC) EVERY 8 HOURS
COMMUNITY
Start: 2022-08-19 | End: 2022-10-19 | Stop reason: ALTCHOICE

## 2022-08-19 RX ORDER — AMOXICILLIN AND CLAVULANATE POTASSIUM 875; 125 MG/1; MG/1
1 TABLET, FILM COATED ORAL EVERY 12 HOURS
COMMUNITY
Start: 2022-08-15 | End: 2022-08-19 | Stop reason: ALTCHOICE

## 2022-08-19 RX ORDER — CEFDINIR 300 MG/1
300 CAPSULE ORAL EVERY 12 HOURS SCHEDULED
Qty: 20 CAPSULE | Refills: 0 | Status: SHIPPED | OUTPATIENT
Start: 2022-08-19 | End: 2022-08-29

## 2022-08-19 RX ORDER — MECLIZINE HCL 12.5 MG/1
12.5 TABLET ORAL 3 TIMES DAILY PRN
Qty: 12 TABLET | Refills: 0 | Status: SHIPPED | OUTPATIENT
Start: 2022-08-19 | End: 2022-08-23

## 2022-08-19 NOTE — PROGRESS NOTES
[facility]        NAME: Kayla Gonzalez is a 65 y o  female  : 1965    MRN: [id number]  DATE: 2022  TIME: 6:05 PM    Assessment and Plan   Acute suppurative otitis media of both ears without spontaneous rupture of tympanic membranes, recurrence not specified [[id number] [id number]]  1  Acute suppurative otitis media of both ears without spontaneous rupture of tympanic membranes, recurrence not specified  cefdinir (OMNICEF) 300 mg capsule   2  Vertigo  meclizine (ANTIVERT) 12 5 MG tablet         Patient Instructions       Follow up with PCP in 3-5 days  Proceed to  ER if symptoms worsen  Chief Complaint     Chief Complaint   Patient presents with    Dizziness     Pt  States that she has a b/l ear infection and is being treated with abx  And ear drops, but started to feel dizzy x last night on and off  History of Present Illness       Patient here for evaluation of continued your pain and pressure  Patient started with some dizziness last night and today  Patient states that occurs when she lays down  She also gets if she moves her head quickly  She was okay through the day today but the vertigo has returned  She denies any nausea, vomiting, weakness, headache  Review of Systems   Review of Systems   Constitutional: Negative  HENT: Positive for ear pain  Negative for congestion, ear discharge, postnasal drip, rhinorrhea, sinus pressure, sinus pain, sneezing, sore throat and trouble swallowing  Eyes: Negative  Respiratory: Negative  Cardiovascular: Negative  Gastrointestinal: Negative  Neurological: Positive for dizziness  Negative for tremors, seizures, syncope, facial asymmetry, speech difficulty, weakness, light-headedness, numbness and headaches           Current Medications       Current Outpatient Medications:     ASPIRIN 81 PO, Take 81 mg by mouth, Disp: , Rfl:     cefdinir (OMNICEF) 300 mg capsule, Take 1 capsule (300 mg total) by mouth every 12 (twelve) hours for 10 days, Disp: 20 capsule, Rfl: 0    meclizine (ANTIVERT) 12 5 MG tablet, Take 1 tablet (12 5 mg total) by mouth 3 (three) times a day as needed for dizziness for up to 4 days, Disp: 12 tablet, Rfl: 0    neomycin-polymyxin-hydrocortisone (CORTISPORIN) 0 35%-10,000 units/mL-1% otic suspension, Use 4 drops every 8 (eight) hours, Disp: , Rfl:     Magnesium 400 MG CAPS, , Disp: , Rfl:     montelukast (SINGULAIR) 10 mg tablet, Take 10 mg by mouth every evening, Disp: , Rfl:     Multiple Vitamins-Minerals (CENTRUM SILVER 50+WOMEN PO), , Disp: , Rfl:     Current Allergies     Allergies as of 08/19/2022    (No Known Allergies)            The following portions of the patient's history were reviewed and updated as appropriate: allergies, current medications, past family history, past medical history, past social history, past surgical history and problem list      Past Medical History:   Diagnosis Date    Colitis     Diverticulitis     Stroke [facility])     unknown origin of 3 "mini strokes"  2008, 2012, [address]        Past Surgical History:   Procedure Laterality Date    ADENOIDECTOMY      CHOLECYSTECTOMY      COLONOSCOPY      DILATION AND CURETTAGE OF UTERUS      DC COLONOSCOPY FLX DX W/COLLJ SPEC WHEN PFRMD N/A 4/16/2018    Procedure: COLONOSCOPY;  Surgeon: Shellie Lock MD;  [facility];   Service: Gastroenterology    TONSILLECTOMY         Family History   Problem Relation Age of Onset    Prostate cancer Father 79    No Known Problems Sister     No Known Problems Daughter     No Known Problems Maternal Grandmother     No Known Problems Maternal Grandfather     No Known Problems Paternal Grandmother     No Known Problems Paternal Grandfather     Breast cancer Paternal Aunt 46    BRCA1 Positive Cousin     BRCA1 Positive Cousin     No Known Problems Sister     No Known Problems Daughter     No Known Problems Maternal Aunt     Colon cancer Paternal Aunt 80         Medications have been verified  Objective   /59   Pulse 83   Temp 97 5 °F (36 4 °C)   Resp 16   Ht 5' 4" (1 626 m)   Wt 112 kg (248 lb)   SpO2 96%   BMI 42 57 kg/m²   No LMP recorded  Patient is postmenopausal        Physical Exam     Physical Exam  Vitals and nursing note reviewed  Constitutional:       General: She is not in acute distress  Appearance: Normal appearance  She is well-developed  She is not ill-appearing, toxic-appearing or diaphoretic  HENT:      Head: Normocephalic and atraumatic  Right Ear: Ear canal normal  No drainage, swelling or tenderness  A middle ear effusion is present  No foreign body  No mastoid tenderness  No hemotympanum  Tympanic membrane is erythematous and bulging  Tympanic membrane is not perforated or retracted  Left Ear: Ear canal normal  No drainage, swelling or tenderness  A middle ear effusion (Cloudy) is present  No foreign body  No mastoid tenderness  No hemotympanum  Tympanic membrane is erythematous and bulging  Tympanic membrane is not perforated or retracted  Nose: Nose normal       Mouth/Throat:      Mouth: Mucous membranes are moist    Eyes:      General:         Right eye: No discharge  Left eye: No discharge  Extraocular Movements: Extraocular movements intact  Conjunctiva/sclera: Conjunctivae normal       Pupils: Pupils are equal, round, and reactive to light  Skin:     General: Skin is warm and dry  Findings: No rash  Neurological:      General: No focal deficit present  Mental Status: She is alert and oriented to person, place, and time  Cranial Nerves: No cranial nerve deficit  Motor: No weakness  Coordination: Coordination normal       Gait: Gait normal       Comments: Negative Romberg  Negative pronator drift  Psychiatric:         Mood and Affect: Mood normal          Behavior: Behavior normal          Thought Content:  Thought content normal  Judgment: Judgment normal

## 2022-08-19 NOTE — PATIENT INSTRUCTIONS
Stop the Augmentin and the ear drops as directed      Follow-up with your primary care physician if symptoms persist    Go to emergency room if your symptoms are worsening

## 2022-10-03 ENCOUNTER — TELEPHONE (OUTPATIENT)
Dept: GASTROENTEROLOGY | Facility: CLINIC | Age: 57
End: 2022-10-03

## 2022-10-03 NOTE — TELEPHONE ENCOUNTER
10/03/22  Screened by: Sarahy Tello    Referring Provider     Pre- Screening: There is no height or weight on file to calculate BMI  Has patient been referred for a routine screening Colonoscopy? yes  Is the patient between 39-70 years old? yes      Previous Colonoscopy yes   If yes:    Date: 2018    Facility:     Reason:       SCHEDULING STAFF: If the patient is between 39yrs-47yrs, please advise patient to confirm benefits/coverage with their insurance company for a routine screening colonoscopy, some insurance carriers will only cover at [phone] or older  If the patient is over 66years old, please schedule an office visit  Does the patient want to see a Gastroenterologist prior to their procedure OR are they having any GI symptoms? no    Has the patient been hospitalized or had abdominal surgery in the past 6 months? no    Does the patient use supplemental oxygen? no    Does the patient take Coumadin, Lovenox, Plavix, Elliquis, Xarelto, or other blood thinning medication? no    Has the patient had a stroke, cardiac event, or stent placed in the past year? no     PT PASSED OA AND CAN BE REACHED [phone]  SCHEDULING STAFF: If patient answers NO to above questions, then schedule procedure  If patient answers YES to above questions, then schedule office appointment  If patient is between 45yrs - 49yrs, please advise patient that we will have to confirm benefits & coverage with their insurance company for a routine screening colonoscopy

## 2022-10-04 ENCOUNTER — PREP FOR PROCEDURE (OUTPATIENT)
Dept: GASTROENTEROLOGY | Facility: AMBULARY SURGERY CENTER | Age: 57
End: 2022-10-04

## 2022-10-04 DIAGNOSIS — Z12.11 SPECIAL SCREENING FOR MALIGNANT NEOPLASMS, COLON: Primary | ICD-10-CM

## 2022-10-04 NOTE — TELEPHONE ENCOUNTER
Patient is scheduled for colonoscopy on October 19, 2022 at [facility]  with Krishna Lacy MD  Patient is aware of pre-procedure prep of Miralax/Dulcolax and they will be called the day prior between 2 and 6 pm for time to report for procedure  Pre-procedure prep has been given to the patient  via [address] mail and e-mail on October 4, 2022

## 2022-10-19 ENCOUNTER — HOSPITAL ENCOUNTER (OUTPATIENT)
Dept: GASTROENTEROLOGY | Facility: AMBULARY SURGERY CENTER | Age: 57
Setting detail: OUTPATIENT SURGERY
Discharge: HOME | End: 2022-10-19
Attending: INTERNAL MEDICINE
Payer: COMMERCIAL

## 2022-10-19 ENCOUNTER — ANESTHESIA EVENT (OUTPATIENT)
Dept: GASTROENTEROLOGY | Facility: AMBULARY SURGERY CENTER | Age: 57
End: 2022-10-19

## 2022-10-19 ENCOUNTER — ANESTHESIA (OUTPATIENT)
Dept: GASTROENTEROLOGY | Facility: AMBULARY SURGERY CENTER | Age: 57
End: 2022-10-19

## 2022-10-19 VITALS
SYSTOLIC BLOOD PRESSURE: 136 MMHG | HEART RATE: 87 BPM | OXYGEN SATURATION: 99 % | TEMPERATURE: 97.3 F | RESPIRATION RATE: 17 BRPM | HEIGHT: 64 IN | BODY MASS INDEX: 42 KG/M2 | DIASTOLIC BLOOD PRESSURE: 62 MMHG | WEIGHT: 246 LBS

## 2022-10-19 DIAGNOSIS — Z12.11 SPECIAL SCREENING FOR MALIGNANT NEOPLASMS, COLON: ICD-10-CM

## 2022-10-19 DIAGNOSIS — Z86.010 HISTORY OF COLON POLYPS: ICD-10-CM

## 2022-10-19 RX ORDER — PROPOFOL 10 MG/ML
INJECTION, EMULSION INTRAVENOUS AS NEEDED
Status: DISCONTINUED | OUTPATIENT
Start: 2022-10-19 | End: 2022-10-19

## 2022-10-19 RX ORDER — LIDOCAINE HYDROCHLORIDE 10 MG/ML
INJECTION, SOLUTION EPIDURAL; INFILTRATION; INTRACAUDAL; PERINEURAL AS NEEDED
Status: DISCONTINUED | OUTPATIENT
Start: 2022-10-19 | End: 2022-10-19

## 2022-10-19 RX ORDER — SODIUM CHLORIDE, SODIUM LACTATE, POTASSIUM CHLORIDE, CALCIUM CHLORIDE 600; 310; 30; 20 MG/100ML; MG/100ML; MG/100ML; MG/100ML
50 INJECTION, SOLUTION INTRAVENOUS CONTINUOUS
Status: DISCONTINUED | OUTPATIENT
Start: 2022-10-19 | End: 2022-10-23 | Stop reason: HOSPADM

## 2022-10-19 RX ORDER — SODIUM CHLORIDE, SODIUM LACTATE, POTASSIUM CHLORIDE, CALCIUM CHLORIDE 600; 310; 30; 20 MG/100ML; MG/100ML; MG/100ML; MG/100ML
INJECTION, SOLUTION INTRAVENOUS CONTINUOUS PRN
Status: DISCONTINUED | OUTPATIENT
Start: 2022-10-19 | End: 2022-10-19

## 2022-10-19 RX ADMIN — PROPOFOL 100 MG: 10 INJECTION, EMULSION INTRAVENOUS at 11:52

## 2022-10-19 RX ADMIN — PROPOFOL 30 MG: 10 INJECTION, EMULSION INTRAVENOUS at 12:04

## 2022-10-19 RX ADMIN — PROPOFOL 30 MG: 10 INJECTION, EMULSION INTRAVENOUS at 12:08

## 2022-10-19 RX ADMIN — LIDOCAINE HYDROCHLORIDE 50 MG: 10 INJECTION, SOLUTION EPIDURAL; INFILTRATION; INTRACAUDAL at 11:52

## 2022-10-19 RX ADMIN — PROPOFOL 50 MG: 10 INJECTION, EMULSION INTRAVENOUS at 11:55

## 2022-10-19 RX ADMIN — PROPOFOL 50 MG: 10 INJECTION, EMULSION INTRAVENOUS at 11:59

## 2022-10-19 RX ADMIN — SODIUM CHLORIDE, SODIUM LACTATE, POTASSIUM CHLORIDE, AND CALCIUM CHLORIDE: .6; .31; .03; .02 INJECTION, SOLUTION INTRAVENOUS at 11:18

## 2022-10-19 NOTE — H&P
History and Physical - [facility]  Jeanette Hanks 65 y o  female MRN: [id number]        HPI:  81-year-old female with history of colon polyps and ischemic colitis in the past   Regular bowel movements  Historical Information   Past Medical History:   Diagnosis Date   • Colitis    • Diverticulitis    • Dizziness    • HL (hearing loss)    • Stroke [facility])     unknown origin of 3 "mini strokes"  2008, 2012, [address] [facility]    • Tinnitus      Past Surgical History:   Procedure Laterality Date   • ADENOIDECTOMY     • CHOLECYSTECTOMY     • COLONOSCOPY     • DILATION AND CURETTAGE OF UTERUS     • SD COLONOSCOPY FLX DX W/COLLJ SPEC WHEN PFRMD N/A 4/16/2018    Procedure: COLONOSCOPY;  Surgeon: Agustina Bonds MD;  Location: [facility]  GI [facility]; Service: Gastroenterology   • TONSILLECTOMY       Social History   Social History     Substance and Sexual Activity   Alcohol Use No     Social History     Substance and Sexual Activity   Drug Use No     Social History     Tobacco Use   Smoking Status Former Smoker   Smokeless Tobacco Never Used     Family History   Problem Relation Age of Onset   • Prostate cancer Father 79   • No Known Problems Sister    • No Known Problems Sister    • No Known Problems Daughter    • No Known Problems Daughter    • No Known Problems Maternal Grandmother    • No Known Problems Maternal Grandfather    • No Known Problems Paternal Grandmother    • No Known Problems Paternal Grandfather    • No Known Problems Maternal Aunt    • Breast cancer Paternal Aunt 53   • Colon cancer Paternal [de-identified]   • BRCA1 Positive Cousin    • BRCA1 Positive Cousin    • Colon cancer Paternal Uncle         colon ca       Meds/Allergies     (Not in a hospital admission)      No Known Allergies    Objective     Blood pressure 123/71, pulse (S) (!) 106, temperature 97 6 °F (36 4 °C), temperature source Temporal, resp   rate 16, height 5' 4" (1 626 m), weight 112 kg (246 lb), SpO2 95 %, not currently breastfeeding      PHYSICAL EXAM:    Gen: NAD  CV: S1 & S2 normal, RRR  CHEST: Clear to auscultate  ABD: soft, NT/ND, good bowel sounds  EXT: no edema    ASSESSMENT:     History of colon polyps, ischemic colitis    PLAN:    Colonoscopy

## 2022-10-19 NOTE — ANESTHESIA PREPROCEDURE EVALUATION
Procedure:  COLONOSCOPY    Relevant Problems   NEURO/PSYCH   (+) Headache     Stroke [facility]) unknown origin of 3 "mini strokes"  2008, 2012, [address] [facility] [address]           Physical Exam    Airway    Mallampati score: II  TM Distance: >3 FB  Neck ROM: full     Dental       Cardiovascular      Pulmonary      Other Findings        Anesthesia Plan  ASA Score- 2     Anesthesia Type- IV sedation with anesthesia with ASA Monitors  Additional Monitors:   Airway Plan:           Plan Factors-    Chart reviewed  Induction- intravenous  Postoperative Plan-     Informed Consent- Anesthetic plan and risks discussed with patient  I personally reviewed this patient with the CRNA  Discussed and agreed on the Anesthesia Plan with the CRNA  Gabriel Caba

## 2022-10-19 NOTE — ANESTHESIA POSTPROCEDURE EVALUATION
Post-Op Assessment Note    CV Status:  Stable  Pain Score: 0    Pain management: adequate     Mental Status:  Sleepy   Hydration Status:  Stable and euvolemic   PONV Controlled:  None   Airway Patency:  Patent      Post Op Vitals Reviewed: Yes      Staff: CRNA         No complications documented      BP   107/61   Temp      Pulse 92   Resp 18   SpO2 99

## 2022-11-18 ENCOUNTER — HOSPITAL ENCOUNTER (EMERGENCY)
Facility: HOSPITAL | Age: 57
Discharge: HOME | End: 2022-11-18
Attending: EMERGENCY MEDICINE

## 2022-11-18 ENCOUNTER — TELEPHONE (OUTPATIENT)
Dept: NEUROLOGY | Facility: CLINIC | Age: 57
End: 2022-11-18

## 2022-11-18 VITALS
WEIGHT: 256 LBS | DIASTOLIC BLOOD PRESSURE: 71 MMHG | HEIGHT: 64 IN | BODY MASS INDEX: 43.71 KG/M2 | RESPIRATION RATE: 18 BRPM | HEART RATE: 82 BPM | OXYGEN SATURATION: 97 % | TEMPERATURE: 97.5 F | SYSTOLIC BLOOD PRESSURE: 139 MMHG

## 2022-11-18 DIAGNOSIS — R42 DIZZINESS: ICD-10-CM

## 2022-11-18 DIAGNOSIS — R42 DIZZINESS AND GIDDINESS: Primary | ICD-10-CM

## 2022-11-18 DIAGNOSIS — R42 VERTIGO: Primary | ICD-10-CM

## 2022-11-18 RX ORDER — DIPHENHYDRAMINE HYDROCHLORIDE 50 MG/ML
25 INJECTION INTRAMUSCULAR; INTRAVENOUS ONCE
Status: COMPLETED | OUTPATIENT
Start: 2022-11-18 | End: 2022-11-18

## 2022-11-18 RX ORDER — MAGNESIUM SULFATE HEPTAHYDRATE 40 MG/ML
2 INJECTION, SOLUTION INTRAVENOUS ONCE
Status: COMPLETED | OUTPATIENT
Start: 2022-11-18 | End: 2022-11-18

## 2022-11-18 RX ORDER — KETOROLAC TROMETHAMINE 30 MG/ML
30 INJECTION, SOLUTION INTRAMUSCULAR; INTRAVENOUS ONCE
Status: COMPLETED | OUTPATIENT
Start: 2022-11-18 | End: 2022-11-18

## 2022-11-18 RX ORDER — METOCLOPRAMIDE HYDROCHLORIDE 5 MG/ML
10 INJECTION INTRAMUSCULAR; INTRAVENOUS ONCE
Status: COMPLETED | OUTPATIENT
Start: 2022-11-18 | End: 2022-11-18

## 2022-11-18 RX ADMIN — METOCLOPRAMIDE 10 MG: 5 INJECTION, SOLUTION INTRAMUSCULAR; INTRAVENOUS at 16:11

## 2022-11-18 RX ADMIN — KETOROLAC TROMETHAMINE 30 MG: 30 INJECTION, SOLUTION INTRAMUSCULAR at 16:15

## 2022-11-18 RX ADMIN — MAGNESIUM SULFATE HEPTAHYDRATE 2 G: 40 INJECTION, SOLUTION INTRAVENOUS at 16:19

## 2022-11-18 RX ADMIN — DIPHENHYDRAMINE HYDROCHLORIDE 25 MG: 50 INJECTION, SOLUTION INTRAMUSCULAR; INTRAVENOUS at 16:08

## 2022-11-18 NOTE — TELEPHONE ENCOUNTER
Patient of Dr Karrie Rodgers /Dr Ritchie Carreon, last visit 7/5, hx of meralgia paresthetica    She is reporting intermittent, persistent "room spinning" triggered by pain when she touches her head around forehead hairline or low neck x 10 days; said was prescribed valium by PCP with only mild relief  Also reporting eyes watering, blurred vision unable to read and severe headache around temples, currently"5/10"  She said she was told by Dr Karrie Rodgers to be cautious as may "black out" due to finding on MRI      MRI brain 6/11/2021 impression:    Partially empty sella turcica may be an incidental finding  Correlate for signs of elevated intracranial CSF pressures      Otherwise unremarkable MRI of the brain    Due to persistent symptoms and feeling like she may black out, I recommended evaluation in ED  She will go to either [facility] or [facility]  Please provide recommendation, thank you

## 2022-11-18 NOTE — DISCHARGE INSTRUCTIONS
Okay to use Flonase or Afrin to help with nasal congestion  Please treat at home with meclizine in Valium as previously prescribed for vertigo

## 2022-11-18 NOTE — TELEPHONE ENCOUNTER
Patient is aware of Dr Verona Youssef input/recommendation  She said she us unable to drive as she never knows when dizziness and pain will occur which is new for her  She will go to [address] [place] [address]; she has a   Dr Verona Youssef, please sign order for ED evaluation; thank you

## 2022-11-18 NOTE — TELEPHONE ENCOUNTER
Danish ramirez    Hi, this is Vi Leonard [birth year]  [birth year] my birthday  I'm having sever pressure in the brain  I'm having dizziness, blurred vision  It's been going on since last Thursday; it's getting a little bit better, but I still have a lot of pain when I touched my head       [phone]

## 2022-11-22 ENCOUNTER — TELEPHONE (OUTPATIENT)
Dept: NEUROLOGY | Facility: CLINIC | Age: 57
End: 2022-11-22

## 2022-11-22 NOTE — TELEPHONE ENCOUNTER
Florencia,     Pt has called requesting a call back as soon as possible as she is having vertigo symptoms , she went to the emergency room and nothing was done, she also stated she has so much pressure on her head and needs to be fixed as she is a  and can not take all these medication  Can youplease assist and call the pt back ?     I thank you in advance,     Bruce Eaton

## 2022-11-23 DIAGNOSIS — R42 VERTIGO: ICD-10-CM

## 2022-11-23 DIAGNOSIS — R51.9 HEADACHE: Primary | ICD-10-CM

## 2022-11-23 RX ORDER — DEXAMETHASONE 2 MG/1
TABLET ORAL
Qty: 6 TABLET | Refills: 0 | Status: SHIPPED | OUTPATIENT
Start: 2022-11-23

## 2022-11-23 NOTE — TELEPHONE ENCOUNTER
Noted that dr Misty Metcalf sent script for Decadron for pt  Called to make pt aware, pt stated that dr Misty Metcalf already called her and she picked up the medication

## 2022-11-23 NOTE — TELEPHONE ENCOUNTER
Pt is calling again with same symptoms  Per chart review pt went to ED on 11/18 as per recommendation from neurology-see encounter from 11/18      Please advise

## 2023-01-12 ENCOUNTER — HOSPITAL ENCOUNTER (OUTPATIENT)
Dept: RADIOLOGY | Facility: HOSPITAL | Age: 58
Discharge: HOME | End: 2023-01-12

## 2023-01-12 DIAGNOSIS — R05.9 COUGH, UNSPECIFIED TYPE: ICD-10-CM

## 2023-02-10 ENCOUNTER — HOSPITAL ENCOUNTER (OUTPATIENT)
Dept: NEUROLOGY | Facility: CLINIC | Age: 58
Discharge: HOME | End: 2023-02-10

## 2023-02-10 DIAGNOSIS — M79.604 LEG PAIN, BILATERAL: ICD-10-CM

## 2023-02-10 DIAGNOSIS — M79.605 LEG PAIN, BILATERAL: ICD-10-CM

## 2023-02-10 DIAGNOSIS — G57.10 MERALGIA PARAESTHETICA: ICD-10-CM

## 2023-02-25 ENCOUNTER — APPOINTMENT (OUTPATIENT)
Dept: LAB | Facility: CLINIC | Age: 58
End: 2023-02-25

## 2023-02-25 DIAGNOSIS — M79.605 LEG PAIN, BILATERAL: ICD-10-CM

## 2023-02-25 DIAGNOSIS — R51.9 HEADACHE: ICD-10-CM

## 2023-02-25 DIAGNOSIS — M79.604 LEG PAIN, BILATERAL: ICD-10-CM

## 2023-02-25 LAB
EST. AVERAGE GLUCOSE BLD GHB EST-MCNC: 120 MG/DL
HBA1C MFR BLD: 5.8 %
TSH SERPL DL<=0.05 MIU/L-ACNC: 0.83 UIU/ML (ref 0.45–4.5)
VIT B12 SERPL-MCNC: 464 PG/ML (ref 100–900)

## 2023-04-18 PROBLEM — R42 VERTIGO: Status: ACTIVE | Noted: 2023-04-18

## 2023-05-10 NOTE — PROGRESS NOTES
PT Evaluation     Today's date: 2023  Patient name: Sandeep Salgado  : 1965  MRN: [id number]  Referring provider: Berta Garrison MD  Dx:   Encounter Diagnosis     ICD-10-CM    1  Neck pain  M54 2       2  Vertigo  R42           Start Time:   Stop Time:   Total time in clinic (min): 45 minutes    Assessment  Assessment details: Sandeep Salgado is a 63 y o  female who presents with signs and symptoms consistent of  Left sided BPPV and persistent neck pain  Patient presents with neck pain, decreased ROM of C/S, positive randa hallpike to the left  I performed the epley maneuver x 2 times with success  Due to these impairments, Patient has difficulty rolling over in bed, looking to the left, going down stairs, and transferring  Patient would benefit from skilled physical therapy to address the impairments, improve their level of function, and to improve their overall quality of life  Neck pain will need to be re-assessed in more detail prior to start of treatment  Dizziness was primary issue addressed today  Primary movement impairments:   1)+Randa hallpike to the left  2)Decreased neck ROM      Impairments: abnormal muscle firing, abnormal muscle tone, abnormal or restricted ROM, abnormal movement, activity intolerance, impaired balance, lacks appropriate home exercise program and pain with function  Understanding of Dx/Px/POC: good   Prognosis: good    Goals  Short Term Goals: to be achieved by 4 weeks  1) Patient to be independent with basic HEP  2) Decrease pain to 2/10 at its worst   3) Increase cervical spine ROM by 5-10 degrees in all deficient planes  4) Pt will demonstrate no dizziness with positional head movements  5) Improve joint mobility in cervical spine to normal     Long Term Goals: to be achieved by discharge  1) FOTO equal to or greater than expected  2) Patient to be independent with comprehensive HEP    3) Cervical spine ROM WNL all planes to improve a/iadls  Plan  Plan details: 1)Address positional dizziness first  2)Fully assess cervical spine when appropriate  Patient would benefit from: PT eval and skilled physical therapy  Planned modality interventions: low level laser therapy  Planned therapy interventions: joint mobilization, manual therapy, patient education, neuromuscular re-education, therapeutic exercise, therapeutic activities and home exercise program  Frequency: 2x per week for 4-6 weeks  Treatment plan discussed with: patient        Subjective Evaluation    History of Present Illness  Mechanism of injury: History of Current Injury: Pt referred to PT by PCP secondary to neck pain and positional vertigo  Pt evaluated by neurology last month secondary to acute reoccurring positional vertigo  Pt did have brain MRI in  without any acute findings  Pt had previous PT in  for her neck with good success  Pt has been having dizziness x 8 months  Pt notes that her dizziness worsens with positional changes such as looking down, walking down stairs, lying back, and rolling side to side  Pt states that the vertigo lasts from 2 seconds to 15 seconds  Pt is also complaining of neck pain which is persistent  She denies any recent GLEN  Pain location/Descriptors: Dull ache from occiput to upper thoracic spine  Denies any UE symptoms  Aggravating factors: Turning to the right increases neck pain  Easing factors: Valium: helped both vertigo and neck pain    Imaging: Brain MRI in   Special Questions: Denies any recent falls  Denies vomiting or nausea  Denies drop attacks, diplopia, dysarthria/dysphagia  Patient goals:  Help with vertigo first and then help the neck  Hobbies/Interest: Photography, hiking, and traveling  Occupation: [employer]-     Pain  Current pain ratin  At worst pain ratin          Objective     Concurrent Complaints  Positive for tinnitus and aural fullness   Negative for nausea/motion sickness, visual change, hearing loss, memory loss, poor concentration and peripheral neuropathy    Active Range of Motion   Cervical/Thoracic Spine       Cervical    Flexion: 30 (pain) degrees   Extension: 35 degrees      Left lateral flexion: 20 (pain) degrees      Right lateral flexion: 35 degrees      Left rotation: 60 (pain) degrees  Right rotation: 45 (pain) degrees           Neuro Exam:     Dizziness  Positive for disequilibrium, vertigo, motion sickness, light-headedness and floating or swimming  Negative for oscillopsia, rocking or swaying and diplopia  Exacerbating factors  Positive for bending over, rolling in bed, turning head, supine to/from sitting and walking in busy environment  Negative for looking up, walking and optokinetic movement       Cervical exam   Ligament Laxity Testing   Alar ligament: WNL  Sharp Yamileth: WNL    Oculomotor exam   Oculomotor ROM: WNL  Resting nystagmus: not present   Gaze holding nystagmus: not present left   Smooth pursuits: saccadic smooth pursuit  Vertical saccades: hypometric  Horizontal saccades: hypometric   Convergence: normal    Positional testing   [place]-Hallpike   Left posterior canal: symptomatic and torsional  Duration: 20 (seconds)      Flowsheet Rows    Flowsheet Row Most Recent Value   PT/OT G-Codes    Current Score 52   Projected Score 60             Precautions: Vertigo, Colitis, Meralgia paraesthetica, headaches      Manuals 5/11            Epley maneuver  x2 to left                                                   Neuro Re-Ed                                                                                                        Ther Ex                                                                                                                     Ther Activity                                       Gait Training                                       Modalities

## 2023-05-11 ENCOUNTER — EVALUATION (OUTPATIENT)
Dept: PHYSICAL THERAPY | Facility: CLINIC | Age: 58
End: 2023-05-11

## 2023-05-11 DIAGNOSIS — R42 VERTIGO: ICD-10-CM

## 2023-05-11 DIAGNOSIS — M54.2 NECK PAIN: Primary | ICD-10-CM

## 2023-05-11 NOTE — LETTER
May 12, 2023    Anitha Gilbert MD  [address] [facility] [address]    Patient: Pratik Coyne   YOB: 1965   Date of Visit: 2023     Encounter Diagnosis     ICD-10-CM    1  Neck pain  M54 2       2  Vertigo  R42           Dear Dr Rafa Theodore:    Thank you for your recent referral of Pratik Coyne  Please review the attached evaluation summary from [facility] recent visit  Please verify that you agree with the plan of care by signing the attached order  If you have any questions or concerns, please do not hesitate to call  I sincerely appreciate the opportunity to share in the care of one of your patients and hope to have another opportunity to work with you in the near future  Sincerely,    Sharene Nyhan, PT      Referring Provider:      I certify that I have read the below Plan of Care and certify the need for these services furnished under this plan of treatment while under my care  Anitha Gilbert MD  [address] [place] [id number]  Via Fax: [phone]          PT Evaluation     Today's date: 2023  Patient name: Pratik Coyne  : 1965  MRN: [id number]  Referring provider: Fede Fitzgerald MD  Dx:   Encounter Diagnosis     ICD-10-CM    1  Neck pain  M54 2       2  Vertigo  R42           Start Time: 0872  Stop Time: 1700  Total time in clinic (min): 45 minutes    Assessment  Assessment details: Pratik Coyne is a 63 y o  female who presents with signs and symptoms consistent of  Left sided BPPV and persistent neck pain  Patient presents with neck pain, decreased ROM of C/S, positive randa hallpike to the left  I performed the epley maneuver x 2 times with success  Due to these impairments, Patient has difficulty rolling over in bed, looking to the left, going down stairs, and transferring   Patient would benefit from skilled physical therapy to address the impairments, improve their level of function, and to improve their overall quality of life  Neck pain will need to be re-assessed in more detail prior to start of treatment  Dizziness was primary issue addressed today  Primary movement impairments:   1)+Kallie hallpike to the left  2)Decreased neck ROM      Impairments: abnormal muscle firing, abnormal muscle tone, abnormal or restricted ROM, abnormal movement, activity intolerance, impaired balance, lacks appropriate home exercise program and pain with function  Understanding of Dx/Px/POC: good   Prognosis: good    Goals  Short Term Goals: to be achieved by 4 weeks  1) Patient to be independent with basic HEP  2) Decrease pain to 2/10 at its worst   3) Increase cervical spine ROM by 5-10 degrees in all deficient planes  4) Pt will demonstrate no dizziness with positional head movements  5) Improve joint mobility in cervical spine to normal     Long Term Goals: to be achieved by discharge  1) FOTO equal to or greater than expected  2) Patient to be independent with comprehensive HEP  3) Cervical spine ROM WNL all planes to improve a/iadls  Plan  Plan details: 1)Address positional dizziness first  2)Fully assess cervical spine when appropriate  Patient would benefit from: PT eval and skilled physical therapy  Planned modality interventions: low level laser therapy  Planned therapy interventions: joint mobilization, manual therapy, patient education, neuromuscular re-education, therapeutic exercise, therapeutic activities and home exercise program  Frequency: 2x per week for 4-6 weeks  Treatment plan discussed with: patient        Subjective Evaluation    History of Present Illness  Mechanism of injury: History of Current Injury: Pt referred to PT by PCP secondary to neck pain and positional vertigo  Pt evaluated by neurology last month secondary to acute reoccurring positional vertigo  Pt did have brain MRI in 2021 without any acute findings  Pt had previous PT in 2010 for her neck with good success   Pt has been having dizziness x 8 months  Pt notes that her dizziness worsens with positional changes such as looking down, walking down stairs, lying back, and rolling side to side  Pt states that the vertigo lasts from 2 seconds to 15 seconds  Pt is also complaining of neck pain which is persistent  She denies any recent GLEN  Pain location/Descriptors: Dull ache from occiput to upper thoracic spine  Denies any UE symptoms  Aggravating factors: Turning to the right increases neck pain  Easing factors: Valium: helped both vertigo and neck pain    Imaging: Brain MRI in   Special Questions: Denies any recent falls  Denies vomiting or nausea  Denies drop attacks, diplopia, dysarthria/dysphagia  Patient goals:  Help with vertigo first and then help the neck  Hobbies/Interest: Photography, hiking, and traveling  Occupation: [employer]    Pain  Current pain ratin  At worst pain ratin          Objective     Concurrent Complaints  Positive for tinnitus and aural fullness  Negative for nausea/motion sickness, visual change, hearing loss, memory loss, poor concentration and peripheral neuropathy    Active Range of Motion   Cervical/Thoracic Spine       Cervical    Flexion: 30 (pain) degrees   Extension: 35 degrees      Left lateral flexion: 20 (pain) degrees      Right lateral flexion: 35 degrees      Left rotation: 60 (pain) degrees  Right rotation: 45 (pain) degrees           Neuro Exam:     Dizziness  Positive for disequilibrium, vertigo, motion sickness, light-headedness and floating or swimming  Negative for oscillopsia, rocking or swaying and diplopia  Exacerbating factors  Positive for bending over, rolling in bed, turning head, supine to/from sitting and walking in busy environment  Negative for looking up, walking and optokinetic movement       Cervical exam   Ligament Laxity Testing   Alar ligament: WNL  Sharp Yamileth: WNL    Oculomotor exam   Oculomotor ROM: WNL  Resting nystagmus: not present Gaze holding nystagmus: not present left   Smooth pursuits: saccadic smooth pursuit  Vertical saccades: hypometric  Horizontal saccades: hypometric   Convergence: normal    Positional testing   [place]-Hallpike   Left posterior canal: symptomatic and torsional  Duration: 20 (seconds)      Flowsheet Rows    Flowsheet Row Most Recent Value   PT/OT G-Codes    Current Score 52   Projected Score 60            Precautions: Vertigo, Colitis, Meralgia paraesthetica, headaches      Manuals 5/11            Epley maneuver  x2 to left                                                   Neuro Re-Ed                                                                                                        Ther Ex                                                                                                                     Ther Activity                                       Gait Training                                       Modalities

## 2023-05-15 ENCOUNTER — OFFICE VISIT (OUTPATIENT)
Dept: PHYSICAL THERAPY | Facility: CLINIC | Age: 58
End: 2023-05-15

## 2023-05-15 DIAGNOSIS — R42 VERTIGO: Primary | ICD-10-CM

## 2023-05-15 DIAGNOSIS — M54.2 NECK PAIN: ICD-10-CM

## 2023-05-15 NOTE — PROGRESS NOTES
Daily Note     Today's date: 5/15/2023  Patient name: Indra Gandara  : 1965  MRN: [id number]  Referring provider: Stephanie Jernigan MD  Dx:   Encounter Diagnosis     ICD-10-CM    1  Vertigo  R42       2  Neck pain  M54 2           Start Time:   Stop Time:   Total time in clinic (min): 45 minutes    Subjective: Pt reports minimal vertigo or dizziness over the weekend  Neck pain: Patient reports a recent history of neck pain which worsened since her vertigo issues  She is no longer experiencing vertigo after Epley maneuver last week  She notes 4/5 out of 10 neck pain  Pain is present from occiput to mid thoracic region  Turning and looking increases symptoms the most  She denies any numbness/tingling or weakness in the extremities  Objective: See treatment diary below  Cervical    Flexion: 55 (pain) degrees   Extension: 40 degrees      Left lateral flexion: 30 (pain) degrees      Right lateral flexion: 45 degrees      Left rotation: 70 (pain) degrees  Right rotation: 70 (pain) degrees   Retraction: poor    Posture: Forward head/rounded shoulders    C/S joint mobility:  Lateral glides: hypomobile lower cervical spine, bilaterally   Pain at C6/7 region    T/C joint mobility:   Severely hypomobile and painful throughout    Assessment: Kallie-Hallpike test was negative bilaterally  Implemented cervical spine assessment and treatment today  Pt presents with cervical mobility deficits, mid thoracic pain, and soft tissue tenderness  Updated HEP to promote cervical motion  Tolerated treatment well  Patient would benefit from continued PT  Plan: Continue per plan of care        Precautions: Vertigo, Colitis, Meralgia paraesthetica, headaches      Manuals 5/11 5/15           Epley maneuver  x2 to left            [place]-Hallpike  Performed            Traction  5'           Lateral glides- lower cervical   Gr II           T/S   Gr II           Neuro Re-Ed             TB Rows             TB extension "            TB Ws             TB no money             C/S retraction  20x                                     Ther Ex             UT stretch  10x5\" B           Cervical rotational snag  10x5\" B           T/S extension over foam roller - seated   20x           UBE                                                                 Ther Activity                                       Gait Training                                       Modalities                                       [employer]  Access Code: [email or web address]       "

## 2023-05-18 ENCOUNTER — APPOINTMENT (OUTPATIENT)
Dept: PHYSICAL THERAPY | Facility: CLINIC | Age: 58
End: 2023-05-18
Payer: COMMERCIAL

## 2023-05-18 DIAGNOSIS — R42 VERTIGO: ICD-10-CM

## 2023-05-18 DIAGNOSIS — M54.2 NECK PAIN: Primary | ICD-10-CM

## 2023-05-22 ENCOUNTER — OFFICE VISIT (OUTPATIENT)
Dept: PHYSICAL THERAPY | Facility: CLINIC | Age: 58
End: 2023-05-22

## 2023-05-22 DIAGNOSIS — M54.2 NECK PAIN: Primary | ICD-10-CM

## 2023-05-22 NOTE — PROGRESS NOTES
"Daily Note     Today's date: 2023  Patient name: Aaliyah Sims  : 1965  MRN: [id number]  Referring provider: Patricia Guzman MD  Dx:   Encounter Diagnosis     ICD-10-CM    1  Neck pain  M54 2                      Subjective: Pt denies much vertigo  Neck feeling tight but alright  Objective: See treatment diary below      Assessment: Tolerated treatment well  Joint mobility in cervicothoracic spine improving  Pt admits to partial compliance to HEP  I did help her logging into HEP on [employer] today to improve compliance  Patient exhibited good technique with therapeutic exercises and would benefit from continued PT  Plan: Continue per plan of care  Precautions: Vertigo, Colitis, Meralgia paraesthetica, headaches      Manuals 5/11 5/15 5/22          Epley maneuver  x2 to left            Kallie-Hallpike  Performed            Traction  5' 5'          Lateral glides- lower cervical   Gr II Gr III          T/S   Gr II Gr III          Neuro Re-Ed             TB Rows             TB extension             TB Ws             TB no money             C/S retraction  20x 20x5\"                                    Ther Ex             UT stretch  10x5\" B 10x5\" B          Cervical rotational snag  10x5\" B 10x5\" B          T/S extension over foam roller - seated   20x Supine x 1'          UBE   3'/3'                                                              Ther Activity                                       Gait Training                                       Modalities                                       [employer]  Access Code: [email or web address]  1:1 with PT From 530-620pm         "

## 2023-05-25 ENCOUNTER — OFFICE VISIT (OUTPATIENT)
Dept: PHYSICAL THERAPY | Facility: CLINIC | Age: 58
End: 2023-05-25

## 2023-05-25 DIAGNOSIS — M54.2 NECK PAIN: Primary | ICD-10-CM

## 2023-05-25 DIAGNOSIS — R42 VERTIGO: ICD-10-CM

## 2023-05-25 NOTE — PROGRESS NOTES
"Daily Note     Today's date: 2023  Patient name: Champ Hannon  : 1965  MRN: [id number]  Referring provider: Mary Kate Francis MD  Dx:   Encounter Diagnosis     ICD-10-CM    1  Neck pain  M54 2       2  Vertigo  R42                      Subjective: Pt feels somewhat under water today  She also states that she gets uneasy when she lowers the bus to the ground  Objective: See treatment diary below      Assessment: All positional tests were negative  Pt seems to have a vestibular hypofunction secondary to prolonged BPPV  Tolerated treatment well  Continued with cervical ROM and mobility work  Patient exhibited good technique with therapeutic exercises and would benefit from continued PT  Plan: Continue per plan of care  Precautions: Vertigo, Colitis, Meralgia paraesthetica, headaches      Manuals 5/11 5/15 5/22 5/25         Epley maneuver  x2 to left            [place]-Hallpike  Performed   Performed L + R         Roll test    Performed L +R         Traction  5' 5' 5'         Lateral glides- lower cervical   Gr II Gr III Gr III         T/S   Gr II Gr III Gr III         Neuro Re-Ed             TB Rows             TB extension             TB Ws             TB no money             C/S retraction  20x 20x5\" 20x5\"                                   Ther Ex             UT stretch  10x5\" B 10x5\" B 10x5\" B         Cervical rotational snag  10x5\" B 10x5\" B 10x5\" B         T/S extension over foam roller - seated   20x Supine x 1' 20x         UBE   3'/3' 3'/3'                                                             Ther Activity                                       Gait Training                                       Modalities                                       [facility]  Access Code: [email or web address]  1:1 with PT From [id number]           "

## 2023-05-30 ENCOUNTER — APPOINTMENT (OUTPATIENT)
Dept: PHYSICAL THERAPY | Facility: CLINIC | Age: 58
End: 2023-05-30
Payer: COMMERCIAL

## 2023-06-01 ENCOUNTER — APPOINTMENT (OUTPATIENT)
Dept: PHYSICAL THERAPY | Facility: CLINIC | Age: 58
End: 2023-06-01
Payer: COMMERCIAL

## 2023-06-05 ENCOUNTER — OFFICE VISIT (OUTPATIENT)
Dept: PHYSICAL THERAPY | Facility: CLINIC | Age: 58
End: 2023-06-05
Payer: COMMERCIAL

## 2023-06-05 DIAGNOSIS — M54.2 NECK PAIN: Primary | ICD-10-CM

## 2023-06-05 PROCEDURE — 97110 THERAPEUTIC EXERCISES: CPT | Performed by: PHYSICAL THERAPIST

## 2023-06-05 PROCEDURE — 97140 MANUAL THERAPY 1/> REGIONS: CPT | Performed by: PHYSICAL THERAPIST

## 2023-06-05 NOTE — PROGRESS NOTES
"Daily Note     Today's date: 2023  Patient name: Timothy Ellis  : 1965  MRN: [id number]  Referring provider: Miya Ng MD  Dx:   Encounter Diagnosis     ICD-10-CM    1  Neck pain  M54 2                      Subjective: Pt reports improving neck and vertigo symptoms  She did lose her mom last week which has made her life stressful  She denies any vertigo or dizziness currently  She is very pleased with her progress  Objective: See treatment diary below  Cervical    Flexion: 55 degrees   Extension: 50 degrees   Left rotation: 86 (pain)   Right rotation: 80 (pain) degrees     Assessment: Tolerated treatment well  Considerable improvement in ROM of cervical spine and dizziness  Pt travels to [place] at the end of this week  She plans to keep up her HEP  Patient would benefit from continued PT  Plan: Continue per plan of care        Precautions: Vertigo, Colitis, Meralgia paraesthetica, headaches      Manuals 5/11 5/15 5/22 5/25 6/5        Epley maneuver  x2 to left            Kallie-Hallpike  Performed   Performed L + R         Roll test    Performed L +R         Traction  5' 5' 5' 5'        Lateral glides- lower cervical   Gr II Gr III Gr III Gr III        T/S   Gr II Gr III Gr III Gr III        Neuro Re-Ed             TB Rows             TB extension             TB Ws             TB no money             C/S retraction  20x 20x5\" 20x5\"                                   Ther Ex             UT stretch  10x5\" B 10x5\" B 10x5\" B 10x5\" B        Cervical rotational snag  10x5\" B 10x5\" B 10x5\" B 10x5\" B        T/S extension over foam roller - seated   20x Supine x 1' 20x         UBE   3'/3' 3'/3' 3'3'        Cervical extension c/ op      10x5\"        Cervical flexion c/ op     10x5\"                                   Ther Activity                                       Gait Training                                       Modalities                                       1:1 with PT from " [place] [address]

## 2023-06-12 ENCOUNTER — EVALUATION (OUTPATIENT)
Dept: PHYSICAL THERAPY | Facility: CLINIC | Age: 58
End: 2023-06-12
Payer: COMMERCIAL

## 2023-06-12 DIAGNOSIS — R42 VERTIGO: ICD-10-CM

## 2023-06-12 DIAGNOSIS — M54.2 NECK PAIN: Primary | ICD-10-CM

## 2023-06-12 PROCEDURE — 97110 THERAPEUTIC EXERCISES: CPT | Performed by: PHYSICAL THERAPIST

## 2023-06-12 PROCEDURE — 97140 MANUAL THERAPY 1/> REGIONS: CPT | Performed by: PHYSICAL THERAPIST

## 2023-06-12 PROCEDURE — 97112 NEUROMUSCULAR REEDUCATION: CPT | Performed by: PHYSICAL THERAPIST

## 2023-06-12 NOTE — LETTER
2023    Yumiko Sanchez MD  [address] [address]    Patient: Kaitlynn Parrish   YOB: 1965   Date of Visit: 2023     Encounter Diagnosis     ICD-10-CM    1  Neck pain  M54 2       2  Vertigo  R42           Dear Dr Mahesh Harris:    Thank you for your recent referral of Kaitlynn Parrish  Please review the attached evaluation summary from [facility] recent visit  Please verify that you agree with the plan of care by signing the attached order  If you have any questions or concerns, please do not hesitate to call  I sincerely appreciate the opportunity to share in the care of one of your patients and hope to have another opportunity to work with you in the near future  Sincerely,    Janette Garvin, PT      Referring Provider:      I certify that I have read the below Plan of Care and certify the need for these services furnished under this plan of treatment while under my care  Yumiko Sanchez MD  [address] [address]  Via Fax: [phone]          [id number]     Today's date: 2023  Patient name: Kaitlynn Parrish  : 1965  MRN: [id number]  Referring provider: Climmie Opitz, MD  Dx:   Encounter Diagnosis     ICD-10-CM    1  Neck pain  M54 2       2  Vertigo  R42           Start Time: 1600  Stop Time: 1650  Total time in clinic (min): 50 minutes    Assessment  Assessment details: Kaitlynn Parrish is a 63 y o  female who 1 month of PT for signs and symptoms consistent of  Left sided BPPV and persistent neck pain  Pt as made good improvements with both her cervical spine and Dizziness  Pt denies much positional vertigo at this time  Pt has not had an episode of dizziness over the last several weeks  ROM of the cervical spine has greatly improved in all planes of motion  Joint mobility and pain in cervical spine has improved as well   Pt continues to be fairly restricted in thoracic spine segmental mobility  Pt will benefit from continued PT at 1 time per week to address cervicothoracic joint mobility and discomfort  Thank you for this referral      Primary movement impairments:   1)+[place] hallpike to the left- 100% improved  2)Decreased neck ROM-Improving       Impairments: abnormal muscle firing, abnormal muscle tone, abnormal or restricted ROM, abnormal movement, activity intolerance, impaired balance, lacks appropriate home exercise program and pain with function  Understanding of Dx/Px/POC: good   Prognosis: good    Goals  Short Term Goals: to be achieved by 4 weeks  1) Patient to be independent with basic HEP -MET  2) Decrease pain to 2/10 at its worst -Partially met  3) Increase cervical spine ROM by 5-10 degrees in all deficient planes -Partially met  4) Pt will demonstrate no dizziness with positional head movements-MET  5) Improve joint mobility in cervical spine to normal -Partially met    Long Term Goals: to be achieved by discharge  1) FOTO equal to or greater than expected  -MET  2) Patient to be independent with comprehensive HEP  -Partially met  3) Cervical spine ROM WNL all planes to improve a/iadls -Partially met          Plan  Plan details: 1)Address positional dizziness first  2)Fully assess cervical spine when appropriate  Patient would benefit from: PT eval and skilled physical therapy  Planned modality interventions: low level laser therapy  Planned therapy interventions: joint mobilization, manual therapy, patient education, neuromuscular re-education, therapeutic exercise, therapeutic activities and home exercise program  Frequency: 1x per week for 4-6 weeks  Treatment plan discussed with: patient        Subjective Evaluation    History of Present Illness  Mechanism of injury: History of Current Injury: Pt referred to PT by PCP secondary to neck pain and positional vertigo  Pt evaluated by neurology last month secondary to acute reoccurring positional vertigo   Pt did have brain MRI in  without any acute findings  Pt had previous PT in 2010 for her neck with good success  Pt has been having dizziness x 8 months  Pt notes that her dizziness worsens with positional changes such as looking down, walking down stairs, lying back, and rolling side to side  Pt states that the vertigo lasts from 2 seconds to 15 seconds  Pt is also complaining of neck pain which is persistent  She denies any recent GLEN  Pain location/Descriptors: Dull ache from occiput to upper thoracic spine  Denies any UE symptoms  Aggravating factors: Turning to the right increases neck pain  Easing factors: Valium: helped both vertigo and neck pain    Imaging: Brain MRI in   Special Questions: Denies any recent falls  Denies vomiting or nausea  Denies drop attacks, diplopia, dysarthria/dysphagia  Patient goals:  Help with vertigo first and then help the neck  Hobbies/Interest: Photography, hiking, and traveling  Occupation: [employer]    Pain  Current pain ratin  At worst pain ratin    Neck pain: Patient reports a recent history of neck pain which worsened since her vertigo issues  She is no longer experiencing vertigo after Epley maneuver last week  She notes 4/5 out of 10 neck pain  Pain is present from occiput to mid thoracic region  Turning and looking increases symptoms the most  She denies any numbness/tingling or weakness in the extremities  Objective     Concurrent Complaints  Positive for tinnitus and aural fullness  Negative for nausea/motion sickness, visual change, hearing loss, memory loss, poor concentration and peripheral neuropathy    Active Range of Motion   Cervical/Thoracic Spine       Cervical    Flexion: 65 degrees   Extension: 60 degrees      Left lateral flexion: 40 degrees      Right lateral flexion: 45 degrees      Left rotation: 80  degrees  Right rotation: 83  degrees     Retraction: neutral     Posture:  Forward head/rounded shoulders    C/S joint mobility:  Lateral glides: "hypomobile lower cervical spine, bilaterally   Pain at C6/7 region: improving     T/C joint mobility:   Severely hypomobile and painful throughout: improving    Neuro Exam:     Dizziness  Positive for disequilibrium, vertigo, motion sickness, light-headedness and floating or swimming  Negative for oscillopsia, rocking or swaying and diplopia  Exacerbating factors  Positive for bending over, rolling in bed, turning head, supine to/from sitting and walking in busy environment  Negative for looking up, walking and optokinetic movement  Cervical exam   Ligament Laxity Testing   Alar ligament: WNL  Sharp Yamileth: WNL    Oculomotor exam   Oculomotor ROM: WNL  Resting nystagmus: not present   Gaze holding nystagmus: not present left   Smooth pursuits: saccadic smooth pursuit  Vertical saccades: WNL  Horizontal saccades:  WNL  Convergence: normal    Positional testing   [place]-Hallpike   Left posterior canal:Negative    Precautions: Vertigo, Colitis, Meralgia paraesthetica, headaches      Manuals 5/11 5/15 5/22 5/25 6/5 6/12       Epley maneuver  x2 to left            [place]-Hallpike  Performed   Performed L + R  assessed L WNL       Roll test    Performed L +R         Traction  5' 5' 5' 5' 5'       Lateral glides- lower cervical   Gr II Gr III Gr III Gr III Gr III       T/S   Gr II Gr III Gr III Gr III Gr III       Neuro Re-Ed             TB Rows             TB extension             TB Ws             TB no money             C/S retraction  20x 20x5\" 20x5\"  20x5\"       Prone scap retraction      15x5\"                    Ther Ex             UT stretch  10x5\" B 10x5\" B 10x5\" B 10x5\" B        Cervical rotational snag  10x5\" B 10x5\" B 10x5\" B 10x5\" B        T/S extension over foam roller - seated   20x Supine x 1' 20x         UBE   3'/3' 3'/3' 3'3' 3'/3'       Cervical extension c/ op      10x5\"        Cervical flexion c/ op     10x5\"         Door way stretch      10x10\"                    Ther Activity                  " Gait Training                                       Modalities                                       1:1 with PT from 4-445pm  Pt was re-evaluated today x 15 minutes

## 2023-06-12 NOTE — PROGRESS NOTES
[id number]     Today's date: 2023  Patient name: Elian Martin  : 1965  MRN: [id number]  Referring provider: Greg Garcia MD  Dx:   Encounter Diagnosis     ICD-10-CM    1  Neck pain  M54 2       2  Vertigo  R42           Start Time: 1600  Stop Time: 1650  Total time in clinic (min): 50 minutes    Assessment  Assessment details: Elian Martin is a 63 y o  female who 1 month of PT for signs and symptoms consistent of  Left sided BPPV and persistent neck pain  Pt as made good improvements with both her cervical spine and Dizziness  Pt denies much positional vertigo at this time  Pt has not had an episode of dizziness over the last several weeks  ROM of the cervical spine has greatly improved in all planes of motion  Joint mobility and pain in cervical spine has improved as well  Pt continues to be fairly restricted in thoracic spine segmental mobility  Pt will benefit from continued PT at 1 time per week to address cervicothoracic joint mobility and discomfort  Thank you for this referral      Primary movement impairments:   1)+[place] hallpike to the left- 100% improved  2)Decreased neck ROM-Improving       Impairments: abnormal muscle firing, abnormal muscle tone, abnormal or restricted ROM, abnormal movement, activity intolerance, impaired balance, lacks appropriate home exercise program and pain with function  Understanding of Dx/Px/POC: good   Prognosis: good    Goals  Short Term Goals: to be achieved by 4 weeks  1) Patient to be independent with basic HEP -MET  2) Decrease pain to 2/10 at its worst -Partially met  3) Increase cervical spine ROM by 5-10 degrees in all deficient planes -Partially met  4) Pt will demonstrate no dizziness with positional head movements-MET  5) Improve joint mobility in cervical spine to normal -Partially met    Long Term Goals: to be achieved by discharge  1) FOTO equal to or greater than expected  -MET  2) Patient to be independent with comprehensive HEP -Partially met  3) Cervical spine ROM WNL all planes to improve a/iadls -Partially met          Plan  Plan details: 1)Address positional dizziness first  2)Fully assess cervical spine when appropriate  Patient would benefit from: PT eval and skilled physical therapy  Planned modality interventions: low level laser therapy  Planned therapy interventions: joint mobilization, manual therapy, patient education, neuromuscular re-education, therapeutic exercise, therapeutic activities and home exercise program  Frequency: 1x per week for 4-6 weeks  Treatment plan discussed with: patient        Subjective Evaluation    History of Present Illness  Mechanism of injury: History of Current Injury: Pt referred to PT by PCP secondary to neck pain and positional vertigo  Pt evaluated by neurology last month secondary to acute reoccurring positional vertigo  Pt did have brain MRI in  without any acute findings  Pt had previous PT in  for her neck with good success  Pt has been having dizziness x 8 months  Pt notes that her dizziness worsens with positional changes such as looking down, walking down stairs, lying back, and rolling side to side  Pt states that the vertigo lasts from 2 seconds to 15 seconds  Pt is also complaining of neck pain which is persistent  She denies any recent GLEN  Pain location/Descriptors: Dull ache from occiput to upper thoracic spine  Denies any UE symptoms  Aggravating factors: Turning to the right increases neck pain  Easing factors: Valium: helped both vertigo and neck pain    Imaging: Brain MRI in   Special Questions: Denies any recent falls  Denies vomiting or nausea  Denies drop attacks, diplopia, dysarthria/dysphagia  Patient goals:  Help with vertigo first and then help the neck     Hobbies/Interest: Photography, hiking, and traveling  Occupation: [employer]     Pain  Current pain ratin  At worst pain ratin    Neck pain: Patient reports a recent history of neck pain which worsened since her vertigo issues  She is no longer experiencing vertigo after Epley maneuver last week  She notes 4/5 out of 10 neck pain  Pain is present from occiput to mid thoracic region  Turning and looking increases symptoms the most  She denies any numbness/tingling or weakness in the extremities  Objective     Concurrent Complaints  Positive for tinnitus and aural fullness  Negative for nausea/motion sickness, visual change, hearing loss, memory loss, poor concentration and peripheral neuropathy    Active Range of Motion   Cervical/Thoracic Spine       Cervical    Flexion: 65 degrees   Extension: 60 degrees      Left lateral flexion: 40 degrees      Right lateral flexion: 45 degrees      Left rotation: 80  degrees  Right rotation: 83  degrees     Retraction: neutral     Posture: Forward head/rounded shoulders    C/S joint mobility:  Lateral glides: hypomobile lower cervical spine, bilaterally   Pain at C6/7 region: improving     T/C joint mobility:   Severely hypomobile and painful throughout: improving    Neuro Exam:     Dizziness  Positive for disequilibrium, vertigo, motion sickness, light-headedness and floating or swimming  Negative for oscillopsia, rocking or swaying and diplopia  Exacerbating factors  Positive for bending over, rolling in bed, turning head, supine to/from sitting and walking in busy environment  Negative for looking up, walking and optokinetic movement  Cervical exam   Ligament Laxity Testing   Alar ligament: WNL  Sharp Yamileth: WNL    Oculomotor exam   Oculomotor ROM: WNL  Resting nystagmus: not present   Gaze holding nystagmus: not present left   Smooth pursuits: saccadic smooth pursuit  Vertical saccades: WNL  Horizontal saccades:  WNL  Convergence: normal    Positional testing   [place]ie-Hallpike   Left posterior canal:Negative     Precautions: Vertigo, Colitis, Meralgia paraesthetica, headaches      Manuals 5/11 5/15 5/22 5/25 6/5 6/12       Epley maneuver " x2 to left            [place]-Hallpike  Performed   Performed L + R  assessed L WNL       Roll test    Performed L +R         Traction  5' 5' 5' 5' 5'       Lateral glides- lower cervical   Gr II Gr III Gr III Gr III Gr III       T/S   Gr II Gr III Gr III Gr III Gr III       Neuro Re-Ed             TB Rows             TB extension             TB Ws             TB no money             C/S retraction  20x 20x5\" 20x5\"  20x5\"       Prone scap retraction      15x5\"                    Ther Ex             UT stretch  10x5\" B 10x5\" B 10x5\" B 10x5\" B        Cervical rotational snag  10x5\" B 10x5\" B 10x5\" B 10x5\" B        T/S extension over foam roller - seated   20x Supine x 1' 20x         UBE   3'/3' 3'/3' 3'3' 3'/3'       Cervical extension c/ op      10x5\"        Cervical flexion c/ op     10x5\"         Door way stretch      10x10\"                    Ther Activity                                       Gait Training                                       Modalities                                       1:1 with PT from 4-445pm  Pt was re-evaluated today x 15 minutes                "

## 2023-06-15 ENCOUNTER — APPOINTMENT (OUTPATIENT)
Dept: PHYSICAL THERAPY | Facility: CLINIC | Age: 58
End: 2023-06-15
Payer: COMMERCIAL

## 2023-06-20 ENCOUNTER — OFFICE VISIT (OUTPATIENT)
Dept: PHYSICAL THERAPY | Facility: CLINIC | Age: 58
End: 2023-06-20
Payer: COMMERCIAL

## 2023-06-20 DIAGNOSIS — M54.2 NECK PAIN: Primary | ICD-10-CM

## 2023-06-20 PROCEDURE — 97112 NEUROMUSCULAR REEDUCATION: CPT | Performed by: PHYSICAL THERAPIST

## 2023-06-20 PROCEDURE — 97140 MANUAL THERAPY 1/> REGIONS: CPT | Performed by: PHYSICAL THERAPIST

## 2023-06-20 PROCEDURE — 97110 THERAPEUTIC EXERCISES: CPT | Performed by: PHYSICAL THERAPIST

## 2023-06-20 NOTE — PROGRESS NOTES
"Daily Note     Today's date: 2023  Patient name: Olivia Encarnacion  : 1965  MRN: [id number]  Referring provider: Vianey Hartley MD  Dx:   Encounter Diagnosis     ICD-10-CM    1  Neck pain  M54 2           Start Time: 154  Stop Time: 1620  Total time in clinic (min): 35 minutes    Subjective: Pt offers no new complaints today  Dizziness continues to remain much improved  Objective: See treatment diary below      Assessment: Pt continues to be restricted in thoracic spine mobility; however, it does improve with MT  MT focused on improving joint mobility and cervicothoracic spine  Implemented thoracic rotation  Tolerated treatment well  Patient would benefit from continued PT  Plan: Continue per plan of care  Precautions: Vertigo, Colitis, Meralgia paraesthetica, headaches      Manuals 5/11 5/15 5/22 5/25 6/5 6/12 6/20      Epley maneuver  x2 to left            [place]ie-Hallpike  Performed   Performed L + R  assessed L WNL       Roll test    Performed L +R         Traction  5' 5' 5' 5' 5' 5'      Lateral glides- lower cervical   Gr II Gr III Gr III Gr III Gr III Gr III      T/S   Gr II Gr III Gr III Gr III Gr III Gr III      Neuro Re-Ed             TB Rows             TB extension             TB Ws             TB no money             C/S retraction  20x 20x5\" 20x5\"  20x5\" 20x5\"      Prone scap retraction      15x5\"                    Ther Ex             UT stretch  10x5\" B 10x5\" B 10x5\" B 10x5\" B        Cervical rotational snag  10x5\" B 10x5\" B 10x5\" B 10x5\" B        T/S extension over foam roller - seated   20x Supine x 1' 20x         UBE   3'/3' 3'/3' 3'3' 3'/3' 3'/3'      Cervical extension c/ op      10x5\"        Cervical flexion c/ op     10x5\"         Door way stretch      10x10\"       Sidelying T/S rotation          15x5\"       Ther Activity                                       Gait Training                                       Modalities                                       1:1 " with PT from 345-415pm

## 2023-06-26 ENCOUNTER — TELEPHONE (OUTPATIENT)
Dept: NEUROLOGY | Facility: CLINIC | Age: 58
End: 2023-06-26

## 2023-06-26 NOTE — TELEPHONE ENCOUNTER
Called pt to schedule f/u appt with provider pt stated she feels good and really does not need a f/u  If she needs a f/u she will call  Denies Latex allergy or sensitivity.    Medication verified and med list updated  Refills needed today: No    PERTINENT DERMATOLOGIC HISTORY   History of skin cancer: Negative  Family history of skin cancer: No family history of skin cancer   History of sunburns/tanning bed use: sunburns as a child/teenager, frequent sunburns as an adult and occasional tanning bed use  Sun protective measures: nothing  Outdoor hobbies: nothing   Occupation: retired      PAST MEDICAL HISTORY:  []  None  []  Melanoma    []  Asthma []  Skin Cancer  []  Eczema []  Keloids  [x]  Allergies []  Psoriasis  []  Vitiligo  []  Alopecia areata    REVIEW OF SYSTEMS:  Yes No  []  [x]  Headaches/vision changes  []  [x]  Fevers/chills/sweats   []  [x]  Unintentional weight loss  []  [x]  Sore throat  []  [x]  Runny nose/cough  []  [x]  Nausea/vomiting  []  [x]  Abdominal pain  []  [x]  Chest pain  []  [x]  Difficulty breathing  []  [x]  Easy bruising or bleeding  []  [x]  Diarrhea/constipation   Yes No    []  [x]  Pacemaker  []  [x]  Blood thinners   []  [x]  Allergy to lidocaine or epinephrine sensitivity    Verbal consent obtained to examine breasts/genitals/buttocks?  no.

## 2023-06-27 ENCOUNTER — OFFICE VISIT (OUTPATIENT)
Dept: PHYSICAL THERAPY | Facility: CLINIC | Age: 58
End: 2023-06-27
Payer: COMMERCIAL

## 2023-06-27 DIAGNOSIS — M54.2 NECK PAIN: Primary | ICD-10-CM

## 2023-06-27 PROCEDURE — 97110 THERAPEUTIC EXERCISES: CPT | Performed by: PHYSICAL THERAPIST

## 2023-06-27 PROCEDURE — 97112 NEUROMUSCULAR REEDUCATION: CPT | Performed by: PHYSICAL THERAPIST

## 2023-06-27 PROCEDURE — 97140 MANUAL THERAPY 1/> REGIONS: CPT | Performed by: PHYSICAL THERAPIST

## 2023-06-27 NOTE — PROGRESS NOTES
PT-Discharge     Today's date: 2023  Patient name: Nicolle Bae  : 1965  MRN: [id number]  Referring provider: Kuldeep Castrejon MD  Dx:   Encounter Diagnosis     ICD-10-CM    1  Neck pain  M54 2           Start Time: 154  Stop Time: 163  Total time in clinic (min): 45 minutes    Subjective: Pt reports minimal neck and thoracic pain at this time  She feels better at work while driving and throughout daily activities  Pt recently received EMG of BLE which identified meralgia paresthesica  Pt would like to address this issue with PT  She will call to schedule  Objective: See treatment diary below      Assessment: Tolerated treatment well  Cervicothoracic mobility improving nicely  Minimal discomfort with MT; however, joint hypomobility persists  Pt feels as though her neck and mid back pain are much improved  She would like to be evaluated for the numbness in her extremities  Pt will call to schedule  All goals achieved at this time  Pt will be discharged at this time  Goals  Short Term Goals: to be achieved by 4 weeks  1) Patient to be independent with basic HEP -MET  2) Decrease pain to 2/10 at its worst -MET  3) Increase cervical spine ROM by 5-10 degrees in all deficient planes  -MET  4) Pt will demonstrate no dizziness with positional head movements-MET  5) Improve joint mobility in cervical spine to normal -MET    Long Term Goals: to be achieved by discharge  1) FOTO equal to or greater than expected  -MET  2) Patient to be independent with comprehensive HEP -MET  3) Cervical spine ROM WNL all planes to improve a/iadls  -MET    Plan: D/C neck/mid thoracic pain   Pt welcome to return for Meralgia paraesthetica     Precautions: Vertigo, Colitis, Meralgia paraesthetica, headaches      Manuals 5/11 5/15 5/22 5/25 6/5 6/12 6/20 6/27     Epley maneuver  x2 to left            [place]-Hallpike  Performed   Performed L + R  assessed L WNL       Roll test    Performed L +R         Traction  5' 5 5' "5' 5' 5' 5'     Lateral glides- lower cervical   Gr II Gr III Gr III Gr III Gr III Gr III Gr III     T/S   Gr II Gr III Gr III Gr III Gr III Gr III Gr III     Neuro Re-Ed             TB Rows             TB extension             TB Ws             TB no money             C/S retraction  20x 20x5\" 20x5\"  20x5\" 20x5\" 20x5\"     Prone scap retraction      15x5\"                    Ther Ex             UT stretch  10x5\" B 10x5\" B 10x5\" B 10x5\" B        Cervical rotational snag  10x5\" B 10x5\" B 10x5\" B 10x5\" B        T/S extension over foam roller - seated   20x Supine x 1' 20x         UBE   3'/3' 3'/3' 3'3' 3'/3' 3'/3' 3'/3'     Cervical extension c/ op      10x5\"        Cervical flexion c/ op     10x5\"         Door way stretch      10x10\"       Sidelying T/S rotation          15x5\"  15x5\"     Supine thoracic extension        Soft bolster 2x1\"     Ther Activity                                       Gait Training                                       Modalities                                       1:1 with PT from 353-430               "

## 2023-07-10 ENCOUNTER — OFFICE VISIT (OUTPATIENT)
Dept: PHYSICAL THERAPY | Facility: CLINIC | Age: 58
End: 2023-07-10
Payer: COMMERCIAL

## 2023-07-10 DIAGNOSIS — R42 VERTIGO: Primary | ICD-10-CM

## 2023-07-10 PROCEDURE — 97161 PT EVAL LOW COMPLEX 20 MIN: CPT | Performed by: PHYSICAL THERAPIST

## 2023-07-10 PROCEDURE — 95992 CANALITH REPOSITIONING PROC: CPT | Performed by: PHYSICAL THERAPIST

## 2023-07-10 NOTE — PROGRESS NOTES
PT Evaluation     Today's date: 7/10/2023  Patient name: Yeimi Seaman  : 1965  MRN: [id number]  Referring provider: Yani Mortensen PT  Dx:   Encounter Diagnosis     ICD-10-CM    1. Vertigo  R42           Start Time: 1200  Stop Time: 1235  Total time in clinic (min): 35 minutes    Assessment  Assessment details: Yeimi Seaman is a 63 y.o. female who presents with signs and symptoms consistent of  Left sided BPPV. Patient presents with a positive randa hallpike to the left. I performed the epley maneuver x 3 times with success. Due to these impairments, Patient has difficulty rolling over in bed, looking to the left, going down stairs, and transferring. Patient would benefit from skilled physical therapy to address the impairments, improve their level of function, and to improve their overall quality of life. Neck pain will need to be re-assessed in more detail prior to start of treatment. Dizziness was primary issue addressed today. Primary movement impairments:   1)+Randa hallpike to the left    Impairments: abnormal coordination, abnormal or restricted ROM and activity intolerance  Understanding of Dx/Px/POC: good   Prognosis: good    Goals  Short Term Goals: to be achieved by 4 weeks  1) Patient to be independent with basic HEP. 2) Pt will demonstrate no dizziness with positional head movements      Long Term Goals: to be achieved by discharge  1) FOTO equal to or greater than expected. 2) Pt will have not episodes of dizziness with turning, rolling, lying, or looking up/down    Plan  Patient would benefit from: PT eval and skilled physical therapy  Planned therapy interventions: patient education, manual therapy, neuromuscular re-education, therapeutic activities, therapeutic exercise, home exercise program and canalith repositioning  Frequency: 1-2x per week fro 2-3 weeks.   Treatment plan discussed with: patient        Subjective Evaluation    History of Present Illness  Mechanism of injury: History of Current Injury: Pt returns to PT after feeling dizziness when looking up, rolling in bed, and lying on her left side. She maneuvered in her pool over the weekend too quickly which caused vertigo. Pt was successfully treated for this in May and diagnosed with Left sided BPPV    Aggravating factors: Looking up, lying back, left sidelying. .   Easing factors: Neutral position  24 HR pattern: Dependent on movement. Imaging: Brain MRI in 2021. Special Questions: Denies any recent falls. Denies vomiting or nausea. Denies drop attacks, diplopia, dysarthria/dysphagia. Patient goals:  Eliminate dizziness. Hobbies/Interest: Photography   Occupation: Drive bus          Objective       Concurrent Complaints  Positive aural fullness. Negative for nausea/motion sickness, visual change, hearing loss, memory loss, poor concentration and peripheral neuropathy     Active Range of Motion   Cervical/Thoracic Spine         Cervical     Flexion: 50 degrees   Extension: 55 degrees   Left rotation: 75 (pain) degrees  Right rotation: 78 (pain) degrees               Neuro Exam:      Dizziness  Positive for disequilibrium, vertigo, motion sickness, light-headedness and floating or swimming. Negative for oscillopsia, rocking or swaying and diplopia.      Exacerbating factors  Positive for bending over, rolling in bed, turning head, supine to/from sitting and walking in busy environment. Negative for looking up, walking and optokinetic movement.      Cervical exam   Ligament Laxity Testing   Alar ligament: WNL  Sharp Yamileth:  WNL  Modified VBI: Negative     Oculomotor exam   Oculomotor ROM: WNL  Resting nystagmus: not present   Gaze holding nystagmus: not present left   Smooth pursuits: saccadic smooth pursuit  Vertical saccades: hypometric  Horizontal saccades: hypometric   Convergence: normal     Positional testing   Kallie-Hallpike   Left posterior canal: symptomatic and torsional  Duration: 8 (seconds)    Flowsheet Rows    Flowsheet Row Most Recent Value   PT/OT G-Codes    Current Score 46   Projected Score 0             Precautions: Vertigo, Headaches      Manuals 7/10            Epley maneuver To the L x 3                                                   Neuro Re-Ed                                                                                                        Ther Ex                                                                                                                     Ther Activity                                       Gait Training                                       Modalities

## 2023-07-13 ENCOUNTER — OFFICE VISIT (OUTPATIENT)
Dept: PHYSICAL THERAPY | Facility: CLINIC | Age: 58
End: 2023-07-13
Payer: COMMERCIAL

## 2023-07-13 DIAGNOSIS — R42 VERTIGO: Primary | ICD-10-CM

## 2023-07-13 PROCEDURE — 97112 NEUROMUSCULAR REEDUCATION: CPT | Performed by: PHYSICAL THERAPIST

## 2023-07-13 PROCEDURE — 95992 CANALITH REPOSITIONING PROC: CPT | Performed by: PHYSICAL THERAPIST

## 2023-07-13 NOTE — PROGRESS NOTES
Daily Note     Today's date: 2023  Patient name: Tremaine Headley  : 1965  MRN: [id number]  Referring provider: Broderick Ren, PT  Dx:   Encounter Diagnosis     ICD-10-CM    1. Vertigo  R42                      Subjective: Pt still feeling slightly off. When she bends down to  her keys she noticed moments of dizzy. Objective: See treatment diary below  L dixhallpike demonstrates mild unease; however, all other positional tests were negative. R dixhallpike negative  R+ L roll test negative. 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 Introduced patient to VOR x1. She is to performed this for 3x 30 seconds up to 3 times per day. Due to patient's persistent positional vertigo, she likely developed a hypofunction as well. Tolerated treatment well. Patient would benefit from continued PT      Plan: Continue per plan of care.       Precautions: Vertigo, Headaches      Manuals 7/10 7/13            Epley maneuver To the L x 3 2x                                                  Neuro Re-Ed             VORx 1 viewing  HEP                                                                                          Ther Ex                                                                                                                     Ther Activity                                       Gait Training                                       Modalities                                       1:1 with PT from 5-530pm

## 2023-07-18 ENCOUNTER — APPOINTMENT (OUTPATIENT)
Dept: PHYSICAL THERAPY | Facility: CLINIC | Age: 58
End: 2023-07-18
Payer: COMMERCIAL

## 2023-10-05 ENCOUNTER — EVALUATION (OUTPATIENT)
Dept: PHYSICAL THERAPY | Facility: CLINIC | Age: 58
End: 2023-10-05
Payer: COMMERCIAL

## 2023-10-05 DIAGNOSIS — R42 VERTIGO: Primary | ICD-10-CM

## 2023-10-05 DIAGNOSIS — H81.12 BPPV (BENIGN PAROXYSMAL POSITIONAL VERTIGO), LEFT: ICD-10-CM

## 2023-10-05 PROCEDURE — 95992 CANALITH REPOSITIONING PROC: CPT | Performed by: PHYSICAL THERAPIST

## 2023-10-05 PROCEDURE — 97161 PT EVAL LOW COMPLEX 20 MIN: CPT | Performed by: PHYSICAL THERAPIST

## 2023-10-06 ENCOUNTER — OFFICE VISIT (OUTPATIENT)
Dept: PHYSICAL THERAPY | Facility: CLINIC | Age: 58
End: 2023-10-06
Payer: COMMERCIAL

## 2023-10-06 DIAGNOSIS — R42 VERTIGO: Primary | ICD-10-CM

## 2023-10-06 DIAGNOSIS — H81.12 BPPV (BENIGN PAROXYSMAL POSITIONAL VERTIGO), LEFT: ICD-10-CM

## 2023-10-06 PROCEDURE — 95992 CANALITH REPOSITIONING PROC: CPT | Performed by: PHYSICAL THERAPIST

## 2023-10-06 NOTE — PROGRESS NOTES
Daily Note     Today's date: 10/6/2023  Patient name: Teofilo Naylor  : 1965  MRN: [id number]  Referring provider: Jaqueline Field MD  Dx:   Encounter Diagnosis     ICD-10-CM    1. Vertigo  R42       2. BPPV (benign paroxysmal positional vertigo), left  H81.12                      Subjective: continued vertigo when lying down      Objective: See treatment diary below      Assessment: Tolerated treatment well. Patient exhibited good technique with therapeutic exercises and would benefit from continued PT. Li maneuver performed first to the left with geotropic nystagmus when changing quickly from left sidelying to right, lasting 25s. On second Li, milder ageotropic nystagmus when lying on the left and continued geotropic on the right. Gufoni was added to end of second Li. Left Epley still had torsional nystagmus but much less intense and shorter duration yesterday. Given right Gufoni for HEP for the right sidelying geotropic nystagmus        Plan: Progress treatment as tolerated.        Precautions: hx headaches and neck pain      Manuals 10/5 10/5           Epley x 3 left SY SY           Li maneuver left  SY           Gufoni  SY                        Neuro Re-Ed                                                                                                        Ther Ex                                                                                                                     Ther Activity                                       Gait Training                                       Modalities

## 2023-10-12 ENCOUNTER — OFFICE VISIT (OUTPATIENT)
Dept: PHYSICAL THERAPY | Facility: CLINIC | Age: 58
End: 2023-10-12
Payer: COMMERCIAL

## 2023-10-12 DIAGNOSIS — R42 VERTIGO: Primary | ICD-10-CM

## 2023-10-12 DIAGNOSIS — H81.12 BPPV (BENIGN PAROXYSMAL POSITIONAL VERTIGO), LEFT: ICD-10-CM

## 2023-10-12 PROCEDURE — 95992 CANALITH REPOSITIONING PROC: CPT | Performed by: PHYSICAL THERAPIST

## 2023-10-12 PROCEDURE — 97140 MANUAL THERAPY 1/> REGIONS: CPT | Performed by: PHYSICAL THERAPIST

## 2023-10-12 PROCEDURE — 97110 THERAPEUTIC EXERCISES: CPT | Performed by: PHYSICAL THERAPIST

## 2023-10-12 NOTE — PROGRESS NOTES
PT-Discharge    Today's date: 10/12/2023  Patient name: Theo Blackwood  : 1965  MRN: [id number]  Referring provider: Augie Viramontes MD  Dx:   Encounter Diagnosis     ICD-10-CM    1. Vertigo  R42       2. BPPV (benign paroxysmal positional vertigo), left  H81.12           Start Time: 0945  Stop Time: 1023  Total time in clinic (min): 38 minutes    Subjective: Pt reports resolving dizziness and vertigo. She states that she has had a headache and tightness in upper neck. Objective: See treatment diary below  ARGENIS: tone and tenderness  OA mobility: hypomobile bilaterally   [place]-hallpike: negative     Assessment: Pt demonstrated a negative randa-hallpike to the left. No nystagmus present. However, due to the intensity of symptoms last session, I completed the Epley maneuver since we were already in this position. After Epley, I focused on improving soft tissue mobility and reducing tenderness [facility]. Tolerated treatment well. Due nearly complete resolution of dizziness and negative positional testing, pt will be discharged at this time. I recommend patient perform supine cervical retraction  and self OA mobilizations to reduce tenderness and improve mobility in this region. Patient is in agreement with POC. Plan: D/C from PT. Pt welcome to return if vertigo hasn't completely resolved.       Precautions: hx headaches and neck pain      Manuals 10/5 10/5 10/12          Epley x 3 left SY SY 1x           Li maneuver left  SY           Gufoni  SY           SOR c/ traction   15'          Neuro Re-Ed                                                                                                        Ther Ex             Cervical retraction   10x5"          L OA self mob   10x5"          R OA self mob   10x5"                                                                           Ther Activity                                       Gait Training                                       Modalities 1:1 with PT from

## 2024-03-14 ENCOUNTER — OFFICE VISIT (OUTPATIENT)
Dept: FAMILY MEDICINE CLINIC | Facility: CLINIC | Age: 59
End: 2024-03-14

## 2024-03-14 VITALS
BODY MASS INDEX: 41.66 KG/M2 | DIASTOLIC BLOOD PRESSURE: 64 MMHG | SYSTOLIC BLOOD PRESSURE: 112 MMHG | HEART RATE: 105 BPM | HEIGHT: 64 IN | WEIGHT: 244 LBS

## 2024-03-14 DIAGNOSIS — Z02.4 ENCOUNTER FOR CDL (COMMERCIAL DRIVING LICENSE) EXAM: Primary | ICD-10-CM

## 2024-03-14 PROCEDURE — 99499 UNLISTED E&M SERVICE: CPT | Performed by: FAMILY MEDICINE

## 2024-03-14 NOTE — PROGRESS NOTES
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

## 2024-03-25 ENCOUNTER — OFFICE VISIT (OUTPATIENT)
Dept: PHYSICAL THERAPY | Facility: CLINIC | Age: 59
End: 2024-03-25
Payer: COMMERCIAL

## 2024-03-25 DIAGNOSIS — R42 VERTIGO: Primary | ICD-10-CM

## 2024-03-25 DIAGNOSIS — R42 DIZZINESS: ICD-10-CM

## 2024-03-25 PROCEDURE — 97162 PT EVAL MOD COMPLEX 30 MIN: CPT | Performed by: PHYSICAL THERAPIST

## 2024-03-25 PROCEDURE — 97112 NEUROMUSCULAR REEDUCATION: CPT | Performed by: PHYSICAL THERAPIST

## 2024-03-25 NOTE — PROGRESS NOTES
PT EVALUATION    Today's date: 24  Patient name: Zhane García  : 1965  MRN: [id number]  Referring provider: Roque Lynne MD  Dx:   1. Vertigo    2. Dizziness        ASSESSMENT:  Zhane García is a 59 y.o. female who presents with signs and symptoms consistent of acute on chronic vertigo. Patient treated previously for vertigo back in October with good success. Patient has acute flare ups every few months with most recent episode occurring this morning when she woke up. Patient reports quick bouts of vertiginous dizziness specifically with head turns and body positions. Overall, patient presents with balance dysfunction and positional dizziness. Upon evaluation this date, clinical examination demonstrated intact cranial nerves, negative neurological testing, and asymptomatic VBI testing. Positional testing was completed with a positive R [place] Hallpike test with fatiguing up-beating torsional nystagmus indicating involvement of posterior canalithiasis BPPV. CRT was performed 3x with mild improvements in intensity of nystagmus however continued to be present. Educated patient in HEP. Will see patient for follow up later this week.  Due to these impairments, Patient has difficulty performing a/iadls, recreational activities, and work-related activities. Patient would benefit from skilled physical therapy to address the impairments, improve their level of function, and to improve their overall quality of life.      Impairments:    restricted ROM    Positional dizziness  Dizziness with head turns     Prognosis:  Good  Positive and negative prognostic indicator(s):  prior success with conservative care    Goals:    Short Term Goals: to be achieved by 4 weeks  1) Patient to be independent with basic HEP.  2) Decrease dizziness to 0-1/10 at its worst.    Long Term Goals: to be achieved by discharge  1) FOTO equal to or greater than target score indicating improvements with overall function.  2)  Patient will demonstrate normalized BPPV testing in order to return to normal daily activities.   3) Patient will have little to no dizziness with positional changes and fast head movements in order to participate in daily activities.      Planned interventions:  home exercise program, patient education, manual therapy, graded activity, canalith repositioning techniques, and vestibulo-ocular motor training    Duration in visits:  4  Frequency: 1 visits per week  Duration in weeks:  4    History of Current Injury: Patient notes that she has had similar episodes in the past. Patient notes that she woke up to go to the bathroom and everything was spinning when she opened her eyes. Patient notes that the episode was short but strong. Patient notes having 3-4 episodes this morning. Patient unable to go to work today. Patient notes that she took a 2-3 hour nap in the recliner and she felt a little better. Patient denies nausea and vomiting. Patient notes that she was also gets some of the dizziness when rolling over both ways. Patient notes tht her eyes have been burning and watering this afternoon for some reason. Ptient notes that she did have some head pressure and her ears popped earlier.     Pain location: chronic neck pain. No current flare up.       Special Questions:   Dizziness: Yes   Dysphagia: No  Dysarthria: No  Diploplia: No  Drop Attacks: No  Numbness: No  Nausea: No  Nystagmus: No        Hobbies/Interests: n/a   Occupation:    Patient goals: Patient reports goals for physical therapy would be to get rid of dizziness.       Objective     Concurrent Complaints  Positive for headaches and aural fullness (ears pop/crack when she swallows). Negative for nausea/motion sickness, tinnitus, visual change, hearing loss, memory loss, poor concentration and peripheral neuropathy    Active Range of Motion   Cervical/Thoracic Spine       Cervical    Flexion: Neck active flexion: WFL.   Extension: Neck active  extension: WFL.      Left lateral flexion: Neck active lateral bend left: WFL.      Right lateral flexion: Neck active lateral bend right: WFL.      Left rotation: Neck active rotation left: WFL.  Right rotation: Neck active rotation right: WFL.     Neuro Exam:     Dizziness  Positive for vertigo and rocking or swaying.   Negative for disequilibrium, oscillopsia, motion sickness, light-headedness, diplopia and floating or swimming.     Exacerbating factors  Positive for rolling in bed, turning head and supine to/from sitting.   Negative for bending over, looking up, walking, optokinetic movement and walking in busy environment.     Symptoms   Duration: 30 seconds or less  Frequency: 4x in a day  Intensity at best: 0/10  Intensity at worst: 5/10  Average intensity: 3/10 and 4/10    Headaches   Patient reports headaches: Yes.   Frequency: 1x this morning but since resolved  Duration: a few hours  Intensity at best: 0/10  Intensity at worst: 5/10  Average intensity: 0/10  Location: head pressure on top of head, headache on the temples    Cervical exam   Ligament Laxity Testing   Alar ligament: WNL  Sharp Yamileth: WNL  Modified VBI   Left: asymptomatic  Right: asymptomatic    Oculomotor exam   Oculomotor ROM: WNL  Resting nystagmus: not present   Gaze holding nystagmus: not present left  and not present right  Smooth pursuits: within normal limits  Vertical saccades: normal  Horizontal saccades: normal  Convergence: normal    Positional testing   Kallie-Hallpike   Right posterior canal: symptomatic, torsional and upbeating  Positional testing comment: Performed testing on R side only secondary to immediate positive testing          Precautions: hx headaches and neck pain      Manuals 3/25                                                                Neuro Re-Ed             CRT  R eply 1x Li maneuver 2x                                                                                           Ther Ex                                                                                                                      Ther Activity                                       Gait Training                                       Modalities

## 2024-03-28 ENCOUNTER — OFFICE VISIT (OUTPATIENT)
Dept: PHYSICAL THERAPY | Facility: CLINIC | Age: 59
End: 2024-03-28
Payer: COMMERCIAL

## 2024-03-28 DIAGNOSIS — R42 VERTIGO: Primary | ICD-10-CM

## 2024-03-28 PROCEDURE — 97112 NEUROMUSCULAR REEDUCATION: CPT | Performed by: PHYSICAL THERAPIST

## 2024-03-28 NOTE — PROGRESS NOTES
"Daily Note     Today's date: 3/28/2024  Patient name: Zhane García  : 1965  MRN: [id number]  Referring provider: Roque Lynne MD  Dx:   Encounter Diagnosis     ICD-10-CM    1. Vertigo  R42           Start Time: 1805  Stop Time: 1850  Total time in clinic (min): 45 minutes    Subjective: Patient notes no spinning dizziness. Patient notes that she does have more nausea recently. Patient notes that looking down she gets the nausea. Patient notes that she slept on her left side and did roll over to the right at all. Patient notes that she still feels \"drunk.\" Patient notes head pressure as well.       Objective: See treatment diary below    Positional testing   Kallie-Hallpike   Left posterior canal: negative   Right posterior canal: 1 beat of up-beat torsional nystagmus, no    Roll Test:   Left:negative  Right: negative      Assessment: Tolerated treatment well. [place]ie Hallpike was performed and demonstrated positive R canalithiasis BPPV with 1 beat of torsional up-beating nystagmus which was great improvement from IE. CRT was performed 3x with improvement in symptoms and no presence of nystagmus with re-test. Patient reported no dizziness or nausea at the end of the session and reported much improvement compared to when she arrived to therapy this date. Will see patient for follow up next week if needed. Patient would benefit from continued PT      Plan: Continue per plan of care.      Precautions: hx headaches and neck pain      Manuals 3/25 3/28                                                               Neuro Re-Ed             CRT  R eply 1x Li maneuver 2x  R eply 2x Li maneuver 1x                                                                                          Ther Ex                                                                                                                     Ther Activity                                       Gait Training                                     "   Modalities

## 2024-04-02 NOTE — PROGRESS NOTES
PT Discharge    Patient called and canceled second follow up stating feeling pretty good and resolution in symptoms. Patients DHI went from 64 to 4 indicating great improvements in overall function. Patient appropriate for DC this date.

## 2024-04-13 ENCOUNTER — APPOINTMENT (OUTPATIENT)
Dept: LAB | Facility: CLINIC | Age: 59
End: 2024-04-13
Payer: COMMERCIAL

## 2024-04-13 DIAGNOSIS — E78.2 MIXED HYPERLIPIDEMIA: ICD-10-CM

## 2024-04-13 DIAGNOSIS — Z00.00 ROUTINE GENERAL MEDICAL EXAMINATION AT A HEALTH CARE FACILITY: ICD-10-CM

## 2024-04-13 LAB
ALBUMIN SERPL BCP-MCNC: 4 G/DL (ref 3.5–5)
ALP SERPL-CCNC: 68 U/L (ref 34–104)
ALT SERPL W P-5'-P-CCNC: 24 U/L (ref 7–52)
ANION GAP SERPL CALCULATED.3IONS-SCNC: 6 MMOL/L (ref 4–13)
AST SERPL W P-5'-P-CCNC: 17 U/L (ref 13–39)
BASOPHILS # BLD AUTO: 0.04 THOUSANDS/ÂΜL (ref 0–0.1)
BASOPHILS NFR BLD AUTO: 1 % (ref 0–1)
BILIRUB SERPL-MCNC: 0.47 MG/DL (ref 0.2–1)
BUN SERPL-MCNC: 20 MG/DL (ref 5–25)
CALCIUM SERPL-MCNC: 9.3 MG/DL (ref 8.4–10.2)
CHLORIDE SERPL-SCNC: 105 MMOL/L (ref 96–108)
CHOLEST SERPL-MCNC: 204 MG/DL
CO2 SERPL-SCNC: 29 MMOL/L (ref 21–32)
CREAT SERPL-MCNC: 0.8 MG/DL (ref 0.6–1.3)
EOSINOPHIL # BLD AUTO: 0.18 THOUSAND/ÂΜL (ref 0–0.61)
EOSINOPHIL NFR BLD AUTO: 3 % (ref 0–6)
ERYTHROCYTE [DISTWIDTH] IN BLOOD BY AUTOMATED COUNT: 14.2 % (ref 11.6–15.1)
GFR SERPL CREATININE-BSD FRML MDRD: 81 ML/MIN/1.73SQ M
GLUCOSE P FAST SERPL-MCNC: 105 MG/DL (ref 65–99)
HCT VFR BLD AUTO: 41.4 % (ref 34.8–46.1)
HDLC SERPL-MCNC: 50 MG/DL
HGB BLD-MCNC: 13.3 G/DL (ref 11.5–15.4)
IMM GRANULOCYTES # BLD AUTO: 0.03 THOUSAND/UL (ref 0–0.2)
IMM GRANULOCYTES NFR BLD AUTO: 0 % (ref 0–2)
LDLC SERPL CALC-MCNC: 129 MG/DL (ref 0–100)
LYMPHOCYTES # BLD AUTO: 1.92 THOUSANDS/ÂΜL (ref 0.6–4.47)
LYMPHOCYTES NFR BLD AUTO: 27 % (ref 14–44)
MCH RBC QN AUTO: 30.5 PG (ref 26.8–34.3)
MCHC RBC AUTO-ENTMCNC: 32.1 G/DL (ref 31.4–37.4)
MCV RBC AUTO: 95 FL (ref 82–98)
MONOCYTES # BLD AUTO: 0.74 THOUSAND/ÂΜL (ref 0.17–1.22)
MONOCYTES NFR BLD AUTO: 10 % (ref 4–12)
NEUTROPHILS # BLD AUTO: 4.19 THOUSANDS/ÂΜL (ref 1.85–7.62)
NEUTS SEG NFR BLD AUTO: 59 % (ref 43–75)
NONHDLC SERPL-MCNC: 154 MG/DL
NRBC BLD AUTO-RTO: 0 /100 WBCS
PLATELET # BLD AUTO: 289 THOUSANDS/UL (ref 149–390)
PMV BLD AUTO: 9.3 FL (ref 8.9–12.7)
POTASSIUM SERPL-SCNC: 4.2 MMOL/L (ref 3.5–5.3)
PROT SERPL-MCNC: 7.1 G/DL (ref 6.4–8.4)
RBC # BLD AUTO: 4.36 MILLION/UL (ref 3.81–5.12)
SODIUM SERPL-SCNC: 140 MMOL/L (ref 135–147)
TRIGL SERPL-MCNC: 123 MG/DL
WBC # BLD AUTO: 7.1 THOUSAND/UL (ref 4.31–10.16)

## 2024-04-13 PROCEDURE — 36415 COLL VENOUS BLD VENIPUNCTURE: CPT

## 2024-04-13 PROCEDURE — 85025 COMPLETE CBC W/AUTO DIFF WBC: CPT

## 2024-04-13 PROCEDURE — 80053 COMPREHEN METABOLIC PANEL: CPT

## 2024-04-13 PROCEDURE — 80061 LIPID PANEL: CPT

## 2024-06-03 NOTE — PROGRESS NOTES
"Advanced Heart Failure/Pulmonary Hypertension Outpatient Note - Zhane García 59 y.o. female MRN: [id number]    @ Encounter: [id number]      Assessment:  59 y.o. female daughter of cardiology pt Mr. Hough PMH and acute problems listed later in this note (a partial list may also be included within 'assessment' section) p/w cardiology consult.  I first met Zhane García on 6/4/24.    High heart rate measured at home and chest fluttering sensation  Follows neuro for LE paresthesias  Vertigo  CVA in her 30s  100lbs weight gain [id number], she attributes this eating crackers to combat vertigo nausea. Also sedentary lifestyle.  Both parents had severe CAD, mother had 5 stents when pt was in her teens.  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    I have reviewed all pertinent patient data including but not limited to:        Lab Units 04/13/24  0738   CREATININE mg/dL 0.80         Lab Results   Component Value Date    K 4.2 04/13/2024     Lab Results   Component Value Date    HGBA1C 5.8 (H) 02/25/2023     Lab Results   Component Value Date    STL9YAYAGGLC 0.829 02/25/2023     Lab Results   Component Value Date    LDLCALC 129 (H) 04/13/2024     No results found for: \"BNP\"   No results found for: \"NTBNP\"       TODAY'S PLAN:     06/04/24  I am meeting patient for the first time today  Warm, euvolemic  She has a number of cardiac complaints today that she has been ignoring due to both her parents illnesses recently. +occasional chest pressure/stabbing with exertion mainly. 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 +progressive SOB though remains quite active.  No dizziness that she does not attribute to vertigo. No syncope recently.  She may have had KIMBERLY she describes during her CVA in her 30s at [facility].    Will get ecg " "today    Check echo    Check pharm NST  Cannot exercise much due to obesity and joint pain    Check zio x 1 week    RTC 1 month after testing  Sooner if worse Sx    Advised exercise and healthy diet, weight loss    Sleep med referral given    Studies:  I have reviewed all pertinent patient data/labs/imaging where available, including but not limited to the below studies. Selected results may be displayed here but comprehensive listing is omitted for note clarity and can be found in the epic chart.    ECG.    Echo.    Stress.    Cath.    HPI:   59 y.o. female daughter of cardiology pt Mr. Hough PMH and acute problems listed later in this note (a partial list may also be included within 'assessment' section) p/w cardiology consult.  I first met Zhane García on 6/4/24.  No new syncope.  +fatigue.  No new unintentional weight changes.  No new leg swelling, PND, pillow orthopnea.        Past Medical History:   Diagnosis Date    Colitis     Diverticulitis     Dizziness     HL (hearing loss)     Stroke ([facility])     unknown origin of 3 \"mini strokes\"  2008, 2012, [facility]     Tinnitus      Patient Active Problem List    Diagnosis Date Noted    Vertigo 04/18/2023    Numbness and tingling of both legs     Meralgia paraesthetica 07/05/2022    Leg pain, bilateral 07/05/2022    Headache 01/11/2022    Gross hematuria 12/22/2021    Encounter to discuss test results 12/22/2021    Vascular insufficiency of intestine (HCC) 05/10/2019    Venous insufficiency (chronic) (peripheral) 05/10/2019    Encounter for screening colonoscopy 03/14/2018    Colitis 02/27/2018       ROS:  10 point ROS negative except as specified in HPI    No Known Allergies  Current Outpatient Medications   Medication Instructions    ASPIRIN 81 PO 81 mg, Oral    Cholecalciferol 50 MCG (2000 UT) TABS 1 capsule, Oral, Daily    diazepam (VALIUM) 5 mg tablet No dose, route, or frequency recorded.    Magnesium 400 MG TABS Oral    meclizine " "(ANTIVERT) 25 mg, Oral, 3 times daily PRN    mometasone (ELOCON) 0.1 % lotion Apply externally, Daily      Social History     Socioeconomic History    Marital status: /Civil Union     Spouse name: Not on file    Number of children: Not on file    Years of education: Not on file    Highest education level: Not on file   Occupational History    Not on file   Tobacco Use    Smoking status: Former    Smokeless tobacco: Never   Vaping Use    Vaping status: Never Used   Substance and Sexual Activity    Alcohol use: No    Drug use: No    Sexual activity: Yes     Partners: Male   Other Topics Concern    Not on file   Social History Narrative    Not on file     Social Determinants of Health     Financial Resource Strain: Not on file   Food Insecurity: Not on file   Transportation Needs: Not on file   Physical Activity: Not on file   Stress: Not on file   Social Connections: Not on file   Intimate Partner Violence: Not on file   Housing Stability: Not on file     Family History   Problem Relation Age of Onset    Prostate cancer Father 67    No Known Problems Sister     No Known Problems Sister     No Known Problems Daughter     No Known Problems Daughter     No Known Problems Maternal Grandmother     No Known Problems Maternal Grandfather     No Known Problems Paternal Grandmother     No Known Problems Paternal Grandfather     No Known Problems Maternal Aunt     Breast cancer Paternal Aunt 52    Colon cancer Paternal Aunt 80    BRCA1 Positive Cousin     BRCA1 Positive Cousin     Colon cancer Paternal Uncle         colon ca       Physical Exam:  Vitals:    06/04/24 0835   BP: 110/70   BP Location: Left arm   Patient Position: Sitting   Cuff Size: Large   Pulse: 87   SpO2: 96%   Weight: 113 kg (248 lb 14.4 oz)   Height: 5' 4\" (1.626 m)     Constitutional: NAD, non toxic, obese  Ears/nose/mouth/throat: atraumatic  CV: RRR, nl S1S2, no murmurs/rubs/gallups, no JVD, no HJR  Resp: CTABL  GI: Soft, NTND  MSK: no swollen " joints in exposed areas  Extr: No edema, warm LE  Pysche: Normal affect  Neuro: appropriate in conversation  Skin: dry and intact in exposed areas    Labs & Results:  Lab Results   Component Value Date    WBC 7.10 04/13/2024    HGB 13.3 04/13/2024    HCT 41.4 04/13/2024    MCV 95 04/13/2024     04/13/2024     Lab Results   Component Value Date    SODIUM 140 04/13/2024    K 4.2 04/13/2024     04/13/2024    CO2 29 04/13/2024    BUN 20 04/13/2024    CREATININE 0.80 04/13/2024    GLUC 113 11/23/2020    CALCIUM 9.3 04/13/2024       Counseling / Coordination of Care  Time spent today 37 minutes.  Greater than 50% of total time was spent with the patient and / or family counseling and / or coordination of care. We discussed diagnoses, most recent studies and any changes in treatment.    Thank you for the opportunity to participate in the care of this patient.    Peter Santos MD  Attending Physician  Advanced Heart Failure and Transplant Cardiology  [facility]

## 2024-06-04 ENCOUNTER — OFFICE VISIT (OUTPATIENT)
Dept: CARDIOLOGY CLINIC | Facility: CLINIC | Age: 59
End: 2024-06-04
Payer: COMMERCIAL

## 2024-06-04 VITALS
BODY MASS INDEX: 42.49 KG/M2 | HEART RATE: 87 BPM | DIASTOLIC BLOOD PRESSURE: 70 MMHG | SYSTOLIC BLOOD PRESSURE: 110 MMHG | WEIGHT: 248.9 LBS | HEIGHT: 64 IN | OXYGEN SATURATION: 96 %

## 2024-06-04 DIAGNOSIS — R00.2 PALPITATION: ICD-10-CM

## 2024-06-04 DIAGNOSIS — E66.9 OBESITY WITHOUT SERIOUS COMORBIDITY, UNSPECIFIED CLASSIFICATION, UNSPECIFIED OBESITY TYPE: ICD-10-CM

## 2024-06-04 DIAGNOSIS — R07.9 CHEST PAIN, UNSPECIFIED TYPE: Primary | ICD-10-CM

## 2024-06-04 DIAGNOSIS — R06.02 SOB (SHORTNESS OF BREATH): ICD-10-CM

## 2024-06-04 PROCEDURE — 99204 OFFICE O/P NEW MOD 45 MIN: CPT | Performed by: STUDENT IN AN ORGANIZED HEALTH CARE EDUCATION/TRAINING PROGRAM

## 2024-06-24 ENCOUNTER — CLINICAL SUPPORT (OUTPATIENT)
Dept: CARDIOLOGY CLINIC | Facility: CLINIC | Age: 59
End: 2024-06-24
Payer: COMMERCIAL

## 2024-06-24 DIAGNOSIS — R00.2 PALPITATION: ICD-10-CM

## 2024-06-24 PROCEDURE — 93244 EXT ECG>48HR<7D REV&INTERPJ: CPT | Performed by: STUDENT IN AN ORGANIZED HEALTH CARE EDUCATION/TRAINING PROGRAM

## 2024-07-02 NOTE — RESULT ENCOUNTER NOTE
Impression:    Patient had a min HR of 63 bpm, max HR of 148 bpm, and avg HR of 93 bpm. Predominant underlying rhythm was Sinus Rhythm. Isolated SVEs were rare (<1.0%), and no SVE Couplets or SVE Triplets were present. Isolated VEs were rare (<1.0%), and no VE Couplets or VE Triplets were present.     The recording showed no occurrence of atrial fibrillation/flutter, prolonged pause, or significant AV block.    There were multiple instances of reported symptoms that correlated with NSR at a normal rate.  Rarely, symptoms correlated with low grade sinus tachycardia.  Very rare instances of symptoms correlating with PAC's.  I reviewed all strips personally.

## 2024-07-08 ENCOUNTER — HOSPITAL ENCOUNTER (OUTPATIENT)
Dept: NON INVASIVE DIAGNOSTICS | Facility: CLINIC | Age: 59
Discharge: HOME | End: 2024-07-08
Payer: COMMERCIAL

## 2024-07-08 VITALS
SYSTOLIC BLOOD PRESSURE: 110 MMHG | HEIGHT: 64 IN | WEIGHT: 248 LBS | BODY MASS INDEX: 42.34 KG/M2 | HEART RATE: 97 BPM | DIASTOLIC BLOOD PRESSURE: 70 MMHG

## 2024-07-08 DIAGNOSIS — R06.02 SOB (SHORTNESS OF BREATH): ICD-10-CM

## 2024-07-08 DIAGNOSIS — R07.9 CHEST PAIN, UNSPECIFIED TYPE: ICD-10-CM

## 2024-07-08 LAB
AORTIC ROOT: 3 CM
APICAL FOUR CHAMBER EJECTION FRACTION: 57 %
ASCENDING AORTA: 3.2 CM
BSA FOR ECHO PROCEDURE: 2.14 M2
E WAVE DECELERATION TIME: 183 MS
E/A RATIO: 0.71
FRACTIONAL SHORTENING: 36 (ref 28–44)
INTERVENTRICULAR SEPTUM IN DIASTOLE (PARASTERNAL SHORT AXIS VIEW): 1.2 CM
INTERVENTRICULAR SEPTUM: 1.2 CM (ref 0.6–1.1)
LAAS-AP2: 12.9 CM2
LAAS-AP4: 19.6 CM2
LEFT ATRIUM AREA SYSTOLE SINGLE PLANE A4C: 18.7 CM2
LEFT ATRIUM SIZE: 4 CM
LEFT ATRIUM VOLUME (MOD BIPLANE): 45 ML
LEFT ATRIUM VOLUME INDEX (MOD BIPLANE): 20.9 ML/M2
LEFT INTERNAL DIMENSION IN SYSTOLE: 2.9 CM (ref 2.1–4)
LEFT VENTRICULAR INTERNAL DIMENSION IN DIASTOLE: 4.5 CM (ref 3.5–6)
LEFT VENTRICULAR POSTERIOR WALL IN END DIASTOLE: 1.2 CM
LEFT VENTRICULAR STROKE VOLUME: 61 ML
LVSV (TEICH): 61 ML
MAX HR PERCENT: 80 %
MAX HR: 131 BPM
MV E'TISSUE VEL-SEP: 9 CM/S
MV PEAK A VEL: 1.08 M/S
MV PEAK E VEL: 77 CM/S
MV STENOSIS PRESSURE HALF TIME: 53 MS
MV VALVE AREA P 1/2 METHOD: 4.15
NUC STRESS EJECTION FRACTION: 67 %
RA PRESSURE ESTIMATED: 5 MMHG
RATE PRESSURE PRODUCT: NORMAL
RIGHT ATRIAL 2D VOLUME: 30 ML
RIGHT ATRIUM AREA SYSTOLE A4C: 13.2 CM2
RIGHT VENTRICLE ID DIMENSION: 3.4 CM
SL CV LEFT ATRIUM LENGTH A2C: 4.5 CM
SL CV LV EF: 55
SL CV PED ECHO LEFT VENTRICLE DIASTOLIC VOLUME (MOD BIPLANE) 2D: 92 ML
SL CV PED ECHO LEFT VENTRICLE SYSTOLIC VOLUME (MOD BIPLANE) 2D: 31 ML
SL CV REST NUCLEAR ISOTOPE DOSE: 10.8 MCI
SL CV STRESS NUCLEAR ISOTOPE DOSE: 33 MCI
SL CV STRESS RECOVERY BP: NORMAL MMHG
SL CV STRESS RECOVERY HR: 117 BPM
SL CV STRESS RECOVERY O2 SAT: 96 %
STRESS ANGINA INDEX: 0
STRESS BASELINE BP: NORMAL MMHG
STRESS BASELINE HR: 81 BPM
STRESS O2 SAT REST: 96 %
STRESS PEAK HR: 131 BPM
STRESS POST ESTIMATED WORKLOAD: 1.5 METS
STRESS POST EXERCISE DUR MIN: 3 MIN
STRESS POST EXERCISE DUR SEC: 0 SEC
STRESS POST O2 SAT PEAK: 98 %
STRESS POST PEAK BP: 128 MMHG
STRESS/REST PERFUSION RATIO: 0.84
TRICUSPID ANNULAR PLANE SYSTOLIC EXCURSION: 1.8 CM

## 2024-07-08 PROCEDURE — 93018 CV STRESS TEST I&R ONLY: CPT | Performed by: INTERNAL MEDICINE

## 2024-07-08 PROCEDURE — 93306 TTE W/DOPPLER COMPLETE: CPT

## 2024-07-08 PROCEDURE — A9502 TC99M TETROFOSMIN: HCPCS

## 2024-07-08 PROCEDURE — 78452 HT MUSCLE IMAGE SPECT MULT: CPT | Performed by: INTERNAL MEDICINE

## 2024-07-08 PROCEDURE — 93016 CV STRESS TEST SUPVJ ONLY: CPT | Performed by: INTERNAL MEDICINE

## 2024-07-08 PROCEDURE — 93306 TTE W/DOPPLER COMPLETE: CPT | Performed by: INTERNAL MEDICINE

## 2024-07-08 PROCEDURE — 78452 HT MUSCLE IMAGE SPECT MULT: CPT

## 2024-07-08 PROCEDURE — 93017 CV STRESS TEST TRACING ONLY: CPT

## 2024-07-08 RX ORDER — REGADENOSON 0.08 MG/ML
0.4 INJECTION, SOLUTION INTRAVENOUS ONCE
Status: COMPLETED | OUTPATIENT
Start: 2024-07-08 | End: 2024-07-08

## 2024-07-08 RX ADMIN — REGADENOSON 0.4 MG: 0.08 INJECTION, SOLUTION INTRAVENOUS at 12:53

## 2024-07-09 LAB
CHEST PAIN STATEMENT: NORMAL
MAX DIASTOLIC BP: 62 MMHG
MAX PREDICTED HEART RATE: 162 BPM
PROTOCOL NAME: NORMAL
REASON FOR TERMINATION: NORMAL
STRESS POST EXERCISE DUR MIN: 3 MIN
STRESS POST EXERCISE DUR SEC: 0 SEC
STRESS POST PEAK HR: 131 BPM
STRESS POST PEAK SYSTOLIC BP: 132 MMHG
TARGET HR FORMULA: NORMAL

## 2024-07-10 NOTE — PROGRESS NOTES
"Advanced Heart Failure/Pulmonary Hypertension Outpatient Note - Zhane García 59 y.o. female MRN: [id number]    @ Encounter: [id number]      Assessment:  59 y.o. female daughter of cardiology pt Mr. Hough PMH and acute problems listed later in this note (a partial list may also be included within 'assessment' section) p/w cardiology consult.  I first met Zhane García on 6/4/24.    Pre-syncope, recurrent, most recent 7/2024  High heart rate measured at home and chest fluttering sensation  Follows neuro for LE paresthesias  Vertigo, Epley type maneuvers reliably correct her Sx she says  She says she takes magnesium for empty sella syndrome. 6/2021 MRI brain: 'IMPRESSION:  Partially empty sella turcica may be an incidental finding.  Correlate for signs of elevated intracranial CSF pressures.  Otherwise unremarkable MRI of the brain.'  CVA in her 30s  Morbidly obese. 100lbs weight gain 9290-0007, she attributes this eating crackers to combat vertigo nausea. Also sedentary lifestyle.  Both parents had severe CAD, mother had 5 stents when pt was in her teens.  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[employer] [place]      I have reviewed all pertinent patient data including but not limited to:        Lab Units 04/13/24  0738   CREATININE mg/dL 0.80         Lab Results   Component Value Date    K 4.2 04/13/2024     Lab Results   Component Value Date    HGBA1C 5.8 (H) 02/25/2023     Lab Results   Component Value Date    [id number] 0.829 02/25/2023     Lab Results   Component Value Date    LDLCALC 129 (H) 04/13/2024     No results found for: \"BNP\"   No results found for: \"NTBNP\"       TODAY'S PLAN:     07/11/24  Warm, euvolemic  Feels ok  Continued pre-syncopal events, most recent about 1 week ago - 5 min after getting out of her truck at home and walking into house, she felt near blackout, steadied herself on wall, waited for sensation to pass, +associated " heart racing/palpitations.    She did not have any events like the above while wearing her recent zio > will place another zio to re-attempt correlation of Sx with rhythm and also check tilt test    Some concern related to her neuro dz as well  Note she has not seen endo in past, related to her empty sella syndrome  She does not have neuro fu scheduled she says    Reviewed her recent cardiac workup:  pharm NST wnl  Echo wnl  Reassuring zio    She cannot drive CDL with ongoing, unexplained pre-syncope events    Sleep med cx scheduled    RTC 1 mo after testing    Colitis stable    HA stable    Key info from my prior notes:    I am meeting patient for the first time today  Warm, euvolemic  She has a number of cardiac complaints today that she has been ignoring due to both her parents illnesses recently. +occasional chest pressure/stabbing with exertion mainly. 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 +progressive SOB though remains quite active.  No dizziness that she does not attribute to vertigo. No syncope recently.  She may have had KIMBERLY she describes during her CVA in her 30s at [facility].    Advised exercise and healthy diet, weight loss    Sleep med referral given    Studies:  I have reviewed all pertinent patient data/labs/imaging where available, including but not limited to the below studies. Selected results may be displayed here but comprehensive listing is omitted for note clarity and can be found in the epic chart.    ECG.    Echo.    Stress.    Cath.    HPI:   59 y.o. female daughter of cardiology pt Mr. Hough PMH and acute problems listed later in this note (a partial list may also be included within 'assessment' section) p/w cardiology consult.  I first met Zhane García on 6/4/24.  No new syncope.  +fatigue.  No new unintentional weight changes.  No new leg swelling, PND, pillow  "orthopnea.    Interval History:  As noted in 'plan' section above and prior epic chart notes.    No new CP/SOB/syncope.  +dizziness, palpitations, presyncope  No new fatigue.  No new unintentional weight changes.  No new leg swelling, PND, pillow orthopnea.  No new fevers, chills, cough, nausea, vomiting, diarrhea, dysuria.        Past Medical History:   Diagnosis Date    Colitis     Diverticulitis     Dizziness     HL (hearing loss)     Stroke ([facility])     unknown origin of 3 \"mini strokes\"  2008, 2012, [facility]      Patient Active Problem List    Diagnosis Date Noted    Vertigo 04/18/2023    Numbness and tingling of both legs     Meralgia paraesthetica 07/05/2022    Leg pain, bilateral 07/05/2022    Headache 01/11/2022    Gross hematuria 12/22/2021    Encounter to discuss test results 12/22/2021    Vascular insufficiency of intestine (HCC) 05/10/2019    Venous insufficiency (chronic) (peripheral) 05/10/2019    Encounter for screening colonoscopy 03/14/2018    Colitis 02/27/2018       ROS:  10 point ROS negative except as specified in HPI    No Known Allergies  Current Outpatient Medications   Medication Instructions    ASPIRIN 81 PO 81 mg, Oral    Cholecalciferol 50 MCG (2000 UT) TABS 1 capsule, Oral, Daily    diazepam (VALIUM) 5 mg tablet No dose, route, or frequency recorded.    Magnesium 400 MG TABS Oral    meclizine (ANTIVERT) 25 mg, Oral, 3 times daily PRN    mometasone (ELOCON) 0.1 % lotion Apply externally, Daily      Social History     Socioeconomic History    Marital status: /Civil Union     Spouse name: Not on file    Number of children: Not on file    Years of education: Not on file    Highest education level: Not on file   Occupational History    Not on file   Tobacco Use    Smoking status: Former    Smokeless tobacco: Never   Vaping Use    Vaping status: Never Used   Substance and Sexual Activity    Alcohol use: No    Drug use: No    Sexual activity: Yes     " "Partners: Male   Other Topics Concern    Not on file   Social History Narrative    Not on file     Social Determinants of Health     Financial Resource Strain: Not on file   Food Insecurity: Not on file   Transportation Needs: Not on file   Physical Activity: Not on file   Stress: Not on file   Social Connections: Not on file   Intimate Partner Violence: Not on file   Housing Stability: Not on file     Family History   Problem Relation Age of Onset    Prostate cancer Father 67    No Known Problems Sister     No Known Problems Sister     No Known Problems Daughter     No Known Problems Daughter     No Known Problems Maternal Grandmother     No Known Problems Maternal Grandfather     No Known Problems Paternal Grandmother     No Known Problems Paternal Grandfather     No Known Problems Maternal Aunt     Breast cancer Paternal Aunt 52    Colon cancer Paternal Aunt 80    BRCA1 Positive Cousin     BRCA1 Positive Cousin     Colon cancer Paternal Uncle         colon ca       Physical Exam:  Vitals:    07/11/24 1633   BP: 110/72   BP Location: Left arm   Patient Position: Sitting   Cuff Size: Large   Pulse: 92   SpO2: 98%   Weight: 113 kg (249 lb 9.6 oz)   Height: 5' 4\" (1.626 m)     Constitutional: NAD, non toxic, obese  Ears/nose/mouth/throat: atraumatic  CV: RRR, nl S1S2, no murmurs/rubs/gallups, no JVD, no HJR  Resp: CTABL  GI: Soft, NTND  MSK: no swollen joints in exposed areas  Extr: No edema, warm LE  Pysche: Normal affect  Neuro: appropriate in conversation  Skin: dry and intact in exposed areas    Labs & Results:  Lab Results   Component Value Date    WBC 7.10 04/13/2024    HGB 13.3 04/13/2024    HCT 41.4 04/13/2024    MCV 95 04/13/2024     04/13/2024     Lab Results   Component Value Date    SODIUM 140 04/13/2024    K 4.2 04/13/2024     04/13/2024    CO2 29 04/13/2024    BUN 20 04/13/2024    CREATININE 0.80 04/13/2024    GLUC 113 11/23/2020    CALCIUM 9.3 04/13/2024       Counseling / Coordination of " Care  Greater than 50% of total time was spent with the patient and / or family counseling and / or coordination of care. We discussed diagnoses, most recent studies and any changes in treatment.    Thank you for the opportunity to participate in the care of this patient.    Peter Santos MD  Attending Physician  Advanced Heart Failure and Transplant Cardiology  [facility]

## 2024-07-11 ENCOUNTER — OFFICE VISIT (OUTPATIENT)
Dept: CARDIOLOGY CLINIC | Facility: CLINIC | Age: 59
End: 2024-07-11
Payer: COMMERCIAL

## 2024-07-11 VITALS
DIASTOLIC BLOOD PRESSURE: 72 MMHG | OXYGEN SATURATION: 98 % | HEART RATE: 92 BPM | WEIGHT: 249.6 LBS | BODY MASS INDEX: 42.61 KG/M2 | HEIGHT: 64 IN | SYSTOLIC BLOOD PRESSURE: 110 MMHG

## 2024-07-11 DIAGNOSIS — R55 PRE-SYNCOPE: Primary | ICD-10-CM

## 2024-07-11 DIAGNOSIS — R06.02 SOB (SHORTNESS OF BREATH): ICD-10-CM

## 2024-07-11 DIAGNOSIS — R00.2 PALPITATION: ICD-10-CM

## 2024-07-11 PROCEDURE — 99214 OFFICE O/P EST MOD 30 MIN: CPT | Performed by: STUDENT IN AN ORGANIZED HEALTH CARE EDUCATION/TRAINING PROGRAM

## 2024-07-12 ENCOUNTER — DOCUMENTATION (OUTPATIENT)
Dept: CARDIOLOGY CLINIC | Facility: CLINIC | Age: 59
End: 2024-07-12

## 2024-07-12 ENCOUNTER — TELEPHONE (OUTPATIENT)
Dept: CARDIOLOGY CLINIC | Facility: CLINIC | Age: 59
End: 2024-07-12

## 2024-07-12 ENCOUNTER — CLINICAL SUPPORT (OUTPATIENT)
Dept: CARDIOLOGY CLINIC | Facility: CLINIC | Age: 59
End: 2024-07-12
Payer: COMMERCIAL

## 2024-07-12 DIAGNOSIS — R00.2 PALPITATION: Primary | ICD-10-CM

## 2024-07-12 PROCEDURE — 93246 EXT ECG>7D<15D RECORDING: CPT | Performed by: STUDENT IN AN ORGANIZED HEALTH CARE EDUCATION/TRAINING PROGRAM

## 2024-07-12 NOTE — TELEPHONE ENCOUNTER
Caller: Zhane    Doctor: Danielle    Reason for call: Pt has STAT tilt table order. Calling to setup. Please advise.     Call back#: [phone]

## 2024-07-16 ENCOUNTER — OFFICE VISIT (OUTPATIENT)
Dept: SLEEP CENTER | Facility: CLINIC | Age: 59
End: 2024-07-16
Payer: COMMERCIAL

## 2024-07-16 VITALS
WEIGHT: 248.4 LBS | DIASTOLIC BLOOD PRESSURE: 70 MMHG | HEART RATE: 80 BPM | HEIGHT: 64 IN | BODY MASS INDEX: 42.41 KG/M2 | OXYGEN SATURATION: 95 % | SYSTOLIC BLOOD PRESSURE: 116 MMHG

## 2024-07-16 DIAGNOSIS — E66.9 OBESITY WITHOUT SERIOUS COMORBIDITY, UNSPECIFIED CLASSIFICATION, UNSPECIFIED OBESITY TYPE: ICD-10-CM

## 2024-07-16 PROCEDURE — 99203 OFFICE O/P NEW LOW 30 MIN: CPT | Performed by: INTERNAL MEDICINE

## 2024-07-16 NOTE — PROGRESS NOTES
Sleep Medicine Outpatient Note   Zhane García 59 y.o. female MRN: [id number]  7/16/2024      Referring Physician: Peter Santos MD    Reason for Consultation:    Chief Complaint   Patient presents with    Consult     Assessment/Plan:    1. Obesity without serious comorbidity, unspecified classification, unspecified obesity type  Assessment & Plan:  She was referred for evaluation for potential sleep apnea due to presyncopal episodes during the day.  She denies any snoring, witnessed apneas, gasping/choking, excessive daytime sleepiness.  She does not drink any coffee or use stimulants.  She rates her sleep quality as excellent.  She does not have hypertension.    Currently her STOP-BANG is 2 due to BMI and age over 50.  Since there is a low pretest probability for moderate to severe obstructive sleep apnea we can hold off on testing at this time after discussion with the patient over benefits and potential implications of sleep testing    Since she does not have sleep symptoms and does not have daytime impairment I think we can hold off testing at this time.  Her vertigo symptoms and presyncopal symptoms are unlikely to be related to undiagnosed sleep apnea    However I recommend weight loss.  We discussed dietary modifications including decreasing carbohydrates, portion size.  She has cut out soda from her diet.  Shehas been prescribed Wegovy by her PCP with insurance approval but the pharmacy currently does not have Wegovy in stock.  She will start Wegovy when that is rectified.    I will follow-up with her in 6 months to 1 year to monitor her symptoms and her BMI.  If she develops worsening symptoms suggestive of obstructive sleep apnea then I will send her for testing  Orders:  -     Ambulatory Referral to Sleep Medicine      Health Maintenance  Immunization History   Administered Date(s) Administered    COVID-19 PFIZER VACCINE 0.3 ML IM 04/15/2021, 05/09/2021    INFLUENZA 05/09/2021        No follow-ups on  "file.    History of Present Illness   HPI:  Zhane García is a 59 y.o. female who has a past medical history of anxiety, who is undergoing evaluation for pre-syncopal episodes    She is referred from her cardiologist for \"tiredness\".   However she denies being tired during the day and says that she meant to say that she gets flutters with exertion and walking.   She doesn't have excessive daytime sleepiness.  She has heart racing walking short distances.    She has no significant choking/gasping during sleep the majority of the case.  Sometimes when her neck is bent forward during sleep she will have some gasping but otherwise her family members say that she does not snore.      Sleep time is usually a bedtime around 730pm and she is up at 330am.  She is up early for work.  When she is not working she sleeps a couple of hours later.  Sleep quality overall is pretty good and she does not have un-refreshing sleep.    She only has one episode of nocturia overnight.  She does not wake up frequently.    100lb weight gain 7593-2086, partially attributable to quitting smoking. Also sedentary lifestyle. CVA in her 30's.      She does not doze off at work or at home.  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  She is on FMLA and is at home right now.  She is undergoing holter monitoring and tilt table test.    Her  has PATRICIA with excessive daytime sleepiness and she feels that she doesn't have any of the symptoms he has.      Quit smoking 10 years ago.  Smoking history - smoked for 30 years.    No illicit drugs or heavy ETOH.    No sleep paralysis, hallucinations.  No cataplexy.  No sleep walking.  Not taking sleep aids or stimulants  Don't think coffee or energy drinks.      She only takes valium during vertigo episodes and not otherwise.      [place] is 0    Historical Information   Past Medical History:   Diagnosis Date    Colitis     Diverticulitis     Dizziness     HL (hearing loss)     Stroke " "(HCC)     unknown origin of 3 \"mini strokes\"  2008, 2012, [facility] and [place]     Tinnitus      Past Surgical History:   Procedure Laterality Date    ADENOIDECTOMY      CHOLECYSTECTOMY      COLONOSCOPY      DILATION AND CURETTAGE OF UTERUS      NH COLONOSCOPY FLX DX W/COLLJ SPEC WHEN PFRMD N/A 4/16/2018    Procedure: COLONOSCOPY;  Surgeon: Negar Patton MD;  Location: [facility];  Service: Gastroenterology    TONSILLECTOMY       Family History   Problem Relation Age of Onset    Prostate cancer Father 67    No Known Problems Sister     No Known Problems Sister     No Known Problems Daughter     No Known Problems Daughter     No Known Problems Maternal Grandmother     No Known Problems Maternal Grandfather     No Known Problems Paternal Grandmother     No Known Problems Paternal Grandfather     No Known Problems Maternal Aunt     Breast cancer Paternal Aunt 52    Colon cancer Paternal Aunt 80    BRCA1 Positive Cousin     BRCA1 Positive Cousin     Colon cancer Paternal Uncle         colon ca         Meds/Allergies     Current Outpatient Medications:     ASPIRIN 81 PO, Take 81 mg by mouth, Disp: , Rfl:     Cholecalciferol 50 MCG (2000 UT) TABS, Take 1 capsule by mouth Daily, Disp: , Rfl:     diazepam (VALIUM) 5 mg tablet, , Disp: , Rfl:     Magnesium 400 MG TABS, Take by mouth, Disp: , Rfl:     meclizine (ANTIVERT) 25 mg tablet, Take 25 mg by mouth 3 (three) times a day as needed, Disp: , Rfl:     mometasone (ELOCON) 0.1 % lotion, Apply topically Daily, Disp: , Rfl:   No Known Allergies    Vitals: Blood pressure 116/70, pulse 80, height 5' 4\" (1.626 m), weight 113 kg (248 lb 6.4 oz), SpO2 95%, not currently breastfeeding. Body mass index is 42.64 kg/m². Oxygen Therapy  SpO2: 95 %      Physical Exam  Vitals and nursing note reviewed.   Constitutional:       General: She is not in acute distress.     Appearance: She is well-developed. She is not ill-appearing, toxic-appearing or diaphoretic.   HENT:      " "Head: Normocephalic and atraumatic.      Mouth/Throat:      Mouth: Mucous membranes are moist.      Pharynx: Oropharynx is clear. No oropharyngeal exudate.      Comments: Mallampati 4  Eyes:      General: No scleral icterus.     Extraocular Movements: Extraocular movements intact.      Conjunctiva/sclera: Conjunctivae normal.   Cardiovascular:      Rate and Rhythm: Normal rate.   Pulmonary:      Effort: Pulmonary effort is normal. No respiratory distress.   Abdominal:      Tenderness: There is no guarding.   Musculoskeletal:         General: No swelling.      Cervical back: Normal range of motion. No rigidity.      Right lower leg: No edema.      Left lower leg: No edema.   Skin:     General: Skin is warm and dry.      Coloration: Skin is not jaundiced.   Neurological:      Mental Status: She is alert. Mental status is at baseline.   Psychiatric:         Mood and Affect: Mood normal.       Neck Circumference: 16     Labs:   I have personally reviewed pertinent lab results.    ABG: No results found for: \"PHART\", \"JGZ9GDV\", \"PO2ART\", \"LTE8EPF\", \"H1OFIUHD\", \"BEART\", \"SOURCE\",   BNP: No results found for: \"BNP\",   CBC:  Lab Results   Component Value Date    WBC 7.10 04/13/2024    HGB 13.3 04/13/2024    HCT 41.4 04/13/2024    MCV 95 04/13/2024     04/13/2024    EOSPCT 3 04/13/2024    EOSABS 0.18 04/13/2024    NEUTOPHILPCT 59 04/13/2024    LYMPHOPCT 27 04/13/2024   ,   CMP:   Lab Results   Component Value Date    SODIUM 140 04/13/2024    K 4.2 04/13/2024     04/13/2024    CO2 29 04/13/2024    ANIONGAP 8 11/07/2015    BUN 20 04/13/2024    CREATININE 0.80 04/13/2024    GLUCOSE 85 11/07/2015    CALCIUM 9.3 04/13/2024    AST 17 04/13/2024    ALT 24 04/13/2024    ALKPHOS 68 04/13/2024    PROT 7.0 11/07/2015    BILITOT 0.37 11/07/2015    EGFR 81 04/13/2024   ,   PT/INR:   Lab Results   Component Value Date    INR 0.84 (L) 02/27/2018   ,   Ferrtin: No components found for: \"FERRTIN\",  Magensium: No results found " "for: \"MAGNESIUM\",    Imaging and other studies: I have personally reviewed pertinent reports.   and I have personally reviewed pertinent films in PACS    Sleep Study:    Transthoracic Echo:  7/8/24  LVEF 55%  G1DD  RV normal  No valvular dysfunction    Pulmonary Function Testing:    Sergio Bedoya MD  Pulmonary, Critical Care and Sleep Medicine  [facility]     Portions of the record may have been created with voice recognition software. Occasional wrong word or \"sound a like\" substitutions may have occurred due to the inherent limitations of voice recognition software. Please read the chart carefully and recognize, using context, where substitutions have occurred.     "

## 2024-07-16 NOTE — ASSESSMENT & PLAN NOTE
She was referred for evaluation for potential sleep apnea due to presyncopal episodes during the day.  She denies any snoring, witnessed apneas, gasping/choking, excessive daytime sleepiness.  She does not drink any coffee or use stimulants.  She rates her sleep quality as excellent.  She does not have hypertension.    Currently her STOP-BANG is 2 due to BMI and age over 50.  Since there is a low pretest probability for moderate to severe obstructive sleep apnea we can hold off on testing at this time after discussion with the patient over benefits and potential implications of sleep testing    Since she does not have sleep symptoms and does not have daytime impairment I think we can hold off testing at this time.  Her vertigo symptoms and presyncopal symptoms are unlikely to be related to undiagnosed sleep apnea    However I recommend weight loss.  We discussed dietary modifications including decreasing carbohydrates, portion size.  She has cut out soda from her diet.  Shehas been prescribed Wegovy by her PCP with insurance approval but the pharmacy currently does not have Wegovy in stock.  She will start Wegovy when that is rectified.    I will follow-up with her in 6 months to 1 year to monitor her symptoms and her BMI.  If she develops worsening symptoms suggestive of obstructive sleep apnea then I will send her for testing

## 2024-07-19 ENCOUNTER — HOSPITAL ENCOUNTER (OUTPATIENT)
Dept: NON INVASIVE DIAGNOSTICS | Facility: HOSPITAL | Age: 59
Discharge: HOME | End: 2024-07-19
Payer: COMMERCIAL

## 2024-07-19 DIAGNOSIS — R55 PRE-SYNCOPE: ICD-10-CM

## 2024-07-19 LAB
MAX DIASTOLIC BP: 80 MMHG
MAX PREDICTED HEART RATE: 162 BPM
PROTOCOL NAME: NORMAL
REASON FOR TERMINATION: NORMAL
STRESS POST EXERCISE DUR MIN: 45 MIN
STRESS POST EXERCISE DUR SEC: 0 SEC
STRESS POST PEAK HR: 106 BPM
STRESS POST PEAK SYSTOLIC BP: 130 MMHG
TARGET HR FORMULA: NORMAL

## 2024-07-19 PROCEDURE — 93660 TILT TABLE EVALUATION: CPT

## 2024-07-19 PROCEDURE — 93660 TILT TABLE EVALUATION: CPT | Performed by: INTERNAL MEDICINE

## 2024-07-22 ENCOUNTER — TELEPHONE (OUTPATIENT)
Age: 59
End: 2024-07-22

## 2024-07-22 NOTE — TELEPHONE ENCOUNTER
Message left advising recommendation for 14 days. Patient advised to call back if she has any questions.

## 2024-07-22 NOTE — TELEPHONE ENCOUNTER
Pt phoned in with questions about the ZIO Monitor; states Dr informed he she needs to wear for 1 month but states the monitor on the device states she has 8 days left of use. Would like some clarity as she reports she is confused. Please call at [phone]

## 2024-07-23 ENCOUNTER — TELEPHONE (OUTPATIENT)
Age: 59
End: 2024-07-23

## 2024-07-23 NOTE — TELEPHONE ENCOUNTER
Caller: Zhane García     Doctor: Dr. Santos     Reason for call: Patient dropped off FMLA paperwork at the [facility] to be signed by Dr. Santos on 7/12/24. She called in to follow up on this so she can pick it back up. I see the form in the media tab but it does not look like it has been signed yet.     Please advise     Call back#: [phone]

## 2024-07-24 ENCOUNTER — TELEPHONE (OUTPATIENT)
Dept: CARDIOLOGY CLINIC | Facility: CLINIC | Age: 59
End: 2024-07-24

## 2024-07-24 NOTE — TELEPHONE ENCOUNTER
Caller: Zhane    Doctor: Danielle    Reason for call: Calling to check status of forms.    Call back#: [phone]

## 2024-07-26 ENCOUNTER — DOCUMENTATION (OUTPATIENT)
Dept: CARDIOLOGY CLINIC | Facility: CLINIC | Age: 59
End: 2024-07-26

## 2024-07-31 ENCOUNTER — CLINICAL SUPPORT (OUTPATIENT)
Dept: CARDIOLOGY CLINIC | Facility: CLINIC | Age: 59
End: 2024-07-31
Payer: COMMERCIAL

## 2024-07-31 DIAGNOSIS — R55 PRE-SYNCOPE: ICD-10-CM

## 2024-07-31 PROCEDURE — 93248 EXT ECG>7D<15D REV&INTERPJ: CPT | Performed by: STUDENT IN AN ORGANIZED HEALTH CARE EDUCATION/TRAINING PROGRAM

## 2024-07-31 NOTE — RESULT ENCOUNTER NOTE
Please notify pt her Tilt table test was normal and her repeat zio monitor was largely normal. We do not have clear cardiac explanation for her near blackouts. He extensive cardiac workup has been near normal and does not explain her pattern of disease.    She will be contacted separately for next steps.      Thanks    - Peter

## 2024-07-31 NOTE — RESULT ENCOUNTER NOTE
Impression:    Patient had a min HR of 56 bpm, max HR of 153 bpm, and avg HR of 92 bpm. Predominant underlying rhythm was Sinus Rhythm. Isolated SVEs were occasional (1.0%, 58974), SVE Couplets were rare (<1.0%, 36), and no SVE Triplets were present. Isolated VEs were rare (<1.0%), and no VE Couplets or VE Triplets were present.    The recording showed no occurrence of atrial fibrillation/flutter, prolonged pause, or significant AV block.    Patient reported symptoms correlated with 1 PAC on 1 occasion and 1 PVC on 1 occasion.  There were multiple instances of reported symptoms that correlated with NSR at a normal rate and also with low grade sinus tachycardia.    I reviewed all strips personally.   Unknown if ever smoked

## 2024-08-01 ENCOUNTER — TELEPHONE (OUTPATIENT)
Age: 59
End: 2024-08-01

## 2024-08-01 ENCOUNTER — TELEPHONE (OUTPATIENT)
Dept: CARDIOLOGY CLINIC | Facility: CLINIC | Age: 59
End: 2024-08-01

## 2024-08-01 NOTE — TELEPHONE ENCOUNTER
Called patient and gave results. Also talked to PCP and sent over Dr. Santos's note for Dr. Lynne.     ----- Message from Peter Santos MD sent at 7/31/2024  1:30 PM EDT -----  Please notify pt her Tilt table test was normal and her repeat zio monitor was largely normal. We do not have clear cardiac explanation for her near blackouts. He extensive cardiac workup has been near normal and does not explain her pattern of disease.    She will be contacted separately for next steps.      Thanks    - Peter

## 2024-08-01 NOTE — TELEPHONE ENCOUNTER
Caller: Zhane García     Doctor: Dr Santos    Reason for call: Patient called in regards to a return to work note, return date should be 8/6/2024(per the patient).  Please fax the note to Dick in human resources at [facility]  Fax number [phone]  Any questions please call Zhane    Call back#: [phone]      Thank you

## 2024-08-02 ENCOUNTER — TELEPHONE (OUTPATIENT)
Age: 59
End: 2024-08-02

## 2024-08-02 NOTE — TELEPHONE ENCOUNTER
Caller: Zhane García    Doctor: Dr. Santos     Reason for call: Patient is requesting a return back to work letter for 8/6/2024, fax #: [phone] goes straight to HR dept. Stated that her day off is Monday and would need soon or they will not allow her to return on 8/6 and would need to wait.    Call back#: [phone]    Benefits, risks, and possible complications of procedure explained to patient/caregiver who verbalized understanding and gave verbal consent.

## 2024-08-06 DIAGNOSIS — R55 PRE-SYNCOPE: Primary | ICD-10-CM

## 2024-08-07 ENCOUNTER — APPOINTMENT (OUTPATIENT)
Dept: LAB | Facility: MEDICAL CENTER | Age: 59
End: 2024-08-07
Payer: COMMERCIAL

## 2024-08-07 ENCOUNTER — TELEPHONE (OUTPATIENT)
Age: 59
End: 2024-08-07

## 2024-08-07 DIAGNOSIS — E03.9 HYPOTHYROIDISM, UNSPECIFIED TYPE: ICD-10-CM

## 2024-08-07 DIAGNOSIS — Z00.00 ROUTINE GENERAL MEDICAL EXAMINATION AT A HEALTH CARE FACILITY: ICD-10-CM

## 2024-08-07 DIAGNOSIS — E23.6 EMPTY SELLA SYNDROME (HCC): ICD-10-CM

## 2024-08-07 NOTE — TELEPHONE ENCOUNTER
"Warm Transfer from Neurology PEP. Spoke with Margarita; staff member from pt's PCP Dr. Roque Lynne office. 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 Cardiology took patient out of work due to vertigo and presyncopal episodes and referred patient to be cleared by Neurology and Endocrinology.     Cardiology:   --Margarita stated that pt was seen today for a FU visit due to a pre-syncope episode pt had in July. Pt saw Cardiology for further work up. 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 Pt had extensive work up and nothing was found to be causing the dizziness from cardiology stand point.     Sleep Medicine Consult 7/16/24:  --Pt saw sleep medicine on 7/16/24 for evaluation for sleep apnea due to presyncopal episodes during the day.Per note- \"Since she does not have sleep symptoms and does not have daytime impairment I think we can hold off testing at this time. Her vertigo symptoms and presyncopal symptoms are unlikely to be related to undiagnosed sleep apnea \"     Pt's PCP office reached out to Neurology to see if pt can be seen sooner than November to be further evaluated and cleared by Neurology in order for pt to return to work.     PCP office is also contacting Endocrinology office to schedule an appt for pt to be further evaluated and cleared.     LOV: 4/18/23 with Dr. Fregoso for Vertigo, Meralgia paraesthetica, HA  NOV: 11/19/24 with Dr. Mannie Chand does not have any sooner FU appts than 11/19/24. Can patient be fit in sooner or can pt be seen by another provider to further evaluate? Pt's PCP office is asking a sooner appt so pt can get back to work ASAP. Please advise     Please contact patient if sooner appt can be made for pt.   "

## 2024-08-08 ENCOUNTER — APPOINTMENT (OUTPATIENT)
Dept: LAB | Facility: MEDICAL CENTER | Age: 59
End: 2024-08-08
Payer: COMMERCIAL

## 2024-08-08 LAB
CORTIS SERPL-MCNC: 16 UG/DL
FSH SERPL-ACNC: 79.5 MIU/ML
LH SERPL-ACNC: 32.1 MIU/ML
PROLACTIN SERPL-MCNC: 5.46 NG/ML (ref 2.74–19.64)

## 2024-08-08 PROCEDURE — 84146 ASSAY OF PROLACTIN: CPT

## 2024-08-08 PROCEDURE — 83001 ASSAY OF GONADOTROPIN (FSH): CPT

## 2024-08-08 PROCEDURE — 83002 ASSAY OF GONADOTROPIN (LH): CPT

## 2024-08-08 PROCEDURE — 36415 COLL VENOUS BLD VENIPUNCTURE: CPT

## 2024-08-08 PROCEDURE — 82024 ASSAY OF ACTH: CPT

## 2024-08-08 PROCEDURE — 82533 TOTAL CORTISOL: CPT

## 2024-08-08 NOTE — TELEPHONE ENCOUNTER
I called and spoke with the patient; offered sooner appt for 8/20 at 3 pm with Dr. Oakes at the [place] office.  Address reconfirmed with the patient.

## 2024-08-08 NOTE — TELEPHONE ENCOUNTER
Patient calling in again to ask for a clearance to return to work from the Neurologist     Patient again asking for a sooner appointment  as they currently have no income and fear  of losing their home     Please assist if possible

## 2024-08-09 LAB — ACTH PLAS-MCNC: 15.6 PG/ML (ref 7.2–63.3)

## 2024-08-20 ENCOUNTER — OFFICE VISIT (OUTPATIENT)
Dept: NEUROLOGY | Facility: CLINIC | Age: 59
End: 2024-08-20
Payer: COMMERCIAL

## 2024-08-20 VITALS
DIASTOLIC BLOOD PRESSURE: 82 MMHG | TEMPERATURE: 97.6 F | HEIGHT: 64 IN | SYSTOLIC BLOOD PRESSURE: 108 MMHG | HEART RATE: 97 BPM | BODY MASS INDEX: 40.97 KG/M2 | WEIGHT: 240 LBS

## 2024-08-20 DIAGNOSIS — R42 LIGHT HEADEDNESS: Primary | ICD-10-CM

## 2024-08-20 PROCEDURE — 99214 OFFICE O/P EST MOD 30 MIN: CPT | Performed by: PSYCHIATRY & NEUROLOGY

## 2024-08-20 RX ORDER — SEMAGLUTIDE 0.5 MG/.5ML
0.5 INJECTION, SOLUTION SUBCUTANEOUS WEEKLY
COMMUNITY

## 2024-08-20 RX ORDER — DIPHENHYDRAMINE HCL 25 MG
CAPSULE ORAL
COMMUNITY
Start: 2024-05-16

## 2024-08-20 NOTE — PROGRESS NOTES
Patient ID: Zhane García is a 59 y.o. female.    Assessment/Plan:    Light headedness  59 year old female presenting after having an episode of light headedness when going from outside to inside a building. She states this was when temperatures were high in the 90s. The patient states this happened one other time when she was going from outside her home to inside her home again with high heat outside and an air conditioned temperature inside. The patient did not report any associated symptoms with these events. Reported they only lasted a few seconds and there was no AMS or weakness associated. The patient has had extensive cardiac work up which has reportedly been benign.    Prior work up:  06/11/2021 - MRI Brain w/o contrast - Partially empty sella turcica may be an incidental finding.  Correlate for signs of elevated intracranial CSF pressures. Otherwise unremarkable MRI of the brain.  Elevated by ophthalmology in the past and was not found to have any elevated pressures.  Labs - Cortisol, Prolactin, FSH, LH, ACTH - normal    Assessment: low suspicion for seizures. Suspect mild dehydration.    Plan:  - Ordered routine EEG             Subjective:    HPI    Zhane García is a 59 year old female with a PMH of vertigo, meralgia paresthetic and headaches presenting to the clinic for a follow up. The patient states on a particularly hot day she was walking from her bus to the office building and she felt a little light headed for a few seconds. The patient drives a bus for work and states when she walked into the building which was air conditioned she felt lightheaded for a few seconds without any other symptoms and felt better after a few seconds. No LOC, no AMS. The patient reports she has only had this happen one other time at home when she was out in her garden and walking into her home. She denies any other associated symptoms.  Patient last seen in our clinic in April 2023 for headaches and meralgia  "paresthetica. Patient reports no more meralgia paresthetica and no significant headaches since she has been continuing to take her Magnesium.     We will order a routine EEG to further rule out any seizure activity/potentials.     The patient's prior MRI in 2021 showed a partially empty sella turcica which was an incidental finding. Patient does not have any hormonal abnormalities, dizziness, vision deficits, headaches. Low suspicion for pituitary related disease processes. Patient had a history of weight gain around the time of the MRI and at the time weight gain related IIH was suspected which has since resolved. Work up with ophthalmology also did not reveal any signs of elevated pressures.     Objective:    Blood pressure 108/82, pulse 97, temperature 97.6 °F (36.4 °C), height 5' 4\" (1.626 m), weight 109 kg (240 lb), not currently breastfeeding.    Physical Exam  Vitals reviewed.   Eyes:      General: Lids are normal.      Extraocular Movements: Extraocular movements intact.      Pupils: Pupils are equal, round, and reactive to light.   Neurological:      Motor: Motor strength is normal.     Coordination: Coordination is intact.      Deep Tendon Reflexes: Reflexes are normal and symmetric.         Neurological Exam  Mental Status  Awake, alert and oriented to person, place and time.    Cranial Nerves  CN II: Visual acuity is normal. Visual fields full to confrontation.  CN III, IV, VI: Extraocular movements intact bilaterally. Normal lids and orbits bilaterally. Pupils equal round and reactive to light bilaterally.  CN V: Facial sensation is normal.  CN VII: Full and symmetric facial movement.  CN VIII: Hearing is normal.  CN IX, X: Palate elevates symmetrically. Normal gag reflex.  CN XI: Shoulder shrug strength is normal.  CN XII: Tongue midline without atrophy or fasciculations.    Motor  Normal muscle bulk throughout. Normal muscle tone. Strength is 5/5 throughout all four extremities.    Sensory  Sensation " is intact to light touch, pinprick, vibration and proprioception in all four extremities.    Reflexes  Deep tendon reflexes are 2+ and symmetric in all four extremities.    Coordination    Finger-to-nose, rapid alternating movements and heel-to-shin normal bilaterally without dysmetria.

## 2024-08-20 NOTE — ASSESSMENT & PLAN NOTE
59 year old female presenting after having an episode of light headedness when going from outside to inside a building. She states this was when temperatures were high in the 90s. The patient states this happened one other time when she was going from outside her home to inside her home again with high heat outside and an air conditioned temperature inside. The patient did not report any associated symptoms with these events. Reported they only lasted a few seconds and there was no AMS or weakness associated. The patient has had extensive cardiac work up which has reportedly been benign.    Prior work up:  06/11/2021 - MRI Brain w/o contrast - Partially empty sella turcica may be an incidental finding.  Correlate for signs of elevated intracranial CSF pressures. Otherwise unremarkable MRI of the brain.  Elevated by ophthalmology in the past and was not found to have any elevated pressures.  Labs - Cortisol, Prolactin, FSH, LH, ACTH - normal    Assessment: low suspicion for seizures. Suspect mild dehydration.    Plan:  - Ordered routine EEG

## 2024-09-04 ENCOUNTER — HOSPITAL ENCOUNTER (OUTPATIENT)
Dept: NEUROLOGY | Facility: CLINIC | Age: 59
Discharge: HOME | End: 2024-09-04
Payer: COMMERCIAL

## 2024-09-04 DIAGNOSIS — R42 LIGHT HEADEDNESS: ICD-10-CM

## 2024-09-04 PROCEDURE — 95816 EEG AWAKE AND DROWSY: CPT

## 2024-09-04 PROCEDURE — 95816 EEG AWAKE AND DROWSY: CPT | Performed by: PSYCHIATRY & NEUROLOGY

## 2024-09-09 ENCOUNTER — TELEPHONE (OUTPATIENT)
Age: 59
End: 2024-09-09

## 2024-09-09 NOTE — TELEPHONE ENCOUNTER
Caller:Zhane García    Doctor: Danielle    Reason for call: Can someone please re fax the return to work letter from Dr. Santos to pt's  employer? Fax number is [phone]. Please call pt and let her know when this has been done. She needs it to be sent prior to 10AM to be on next day work schedule    Call back#: [phone]

## 2024-09-09 NOTE — TELEPHONE ENCOUNTER
Advised pt that PCP needs to decide with other specialist that she can return to work. Verbally understood.    Sent letter written by Dr. Santos today to pt's employer.

## 2024-09-10 ENCOUNTER — TELEPHONE (OUTPATIENT)
Age: 59
End: 2024-09-10

## 2024-09-10 ENCOUNTER — TELEPHONE (OUTPATIENT)
Dept: CARDIOLOGY CLINIC | Facility: CLINIC | Age: 59
End: 2024-09-10

## 2024-09-10 NOTE — TELEPHONE ENCOUNTER
Pt called in regards to EEG results. Pt needs to be cleared in order to go back to work. Pt was seen by Dr. Oakes 8/20/24.   EEG was done on 9/4/24.      Please assist pt   [phone]

## 2024-09-10 NOTE — TELEPHONE ENCOUNTER
Pt called and stated the PCP will not give her a note to go back to work.  He stated you told her she could not work, you need to write to send her back to work.      Please advise

## 2024-09-10 NOTE — TELEPHONE ENCOUNTER
Advised pt. Verbally understood. Sent letter written on 9/9/24 to pt's employer.  
Caller: Zhane García    Doctor/Office: Dr. Santos    CB#: [phone]      What needs to be faxed: Patient needs a return to work letter faxed to her employer, [employer].    ATTN to:  Department    Fax#: [phone]    Per patient, she has seen neurology, completed EEG and blood work requested. She was told that return to work letter needs to be generated by the provider who initially stated she could not work.  She would like a call back when RTW date is determined and letter has been faxed.        
Sent this task and the letter written by Dr. Santso yesterday to Dr. Lynne's office. # [phone].  
,DirectAddress_Unknown

## 2024-09-11 NOTE — LETTER
September 12, 2024     Zhane García  [address] [place] [id number]    Patient: Zhane García   YOB: 1965   Date of Visit: 9/11/2024       To whom it may concern,    This patient should remain out of work until her endocrinology evaluation on 9/27/24. Please do not hesitate to call me with questions.         Sincerely,        Peter Santos MD        CC: No Recipients

## 2024-09-11 NOTE — PROGRESS NOTES
Spoke with patient 9/11/24 2:50pm via phone. Has been feeling well, no new syncope or pre-syncope episodes. No SOB. No CP.    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 She was told her neuro evaluation was unrevealing; I am awaiting a response from her neuro team now regarding conclusion of neuro workup and formal recommendations. She has endocrine consultation 9/27/24. If these specialists determine no significant pathology than this information combined with her negative cardiac evaluation would enable return to work after 9/27/24.

## 2024-09-11 NOTE — TELEPHONE ENCOUNTER
Patient called back and would likt to speak with Hortensia directly. Please give her a call back.Thanks

## 2024-09-27 ENCOUNTER — CONSULT (OUTPATIENT)
Dept: ENDOCRINOLOGY | Facility: HOSPITAL | Age: 59
End: 2024-09-27
Attending: STUDENT IN AN ORGANIZED HEALTH CARE EDUCATION/TRAINING PROGRAM
Payer: COMMERCIAL

## 2024-09-27 ENCOUNTER — APPOINTMENT (OUTPATIENT)
Dept: LAB | Facility: CLINIC | Age: 59
End: 2024-09-27
Payer: COMMERCIAL

## 2024-09-27 ENCOUNTER — HOSPITAL ENCOUNTER (OUTPATIENT)
Dept: RADIOLOGY | Facility: MEDICAL CENTER | Age: 59
Discharge: HOME | End: 2024-09-27
Payer: COMMERCIAL

## 2024-09-27 VITALS
SYSTOLIC BLOOD PRESSURE: 122 MMHG | BODY MASS INDEX: 40.87 KG/M2 | WEIGHT: 239.4 LBS | HEART RATE: 92 BPM | HEIGHT: 64 IN | DIASTOLIC BLOOD PRESSURE: 80 MMHG

## 2024-09-27 DIAGNOSIS — E04.1 THYROID NODULE: ICD-10-CM

## 2024-09-27 DIAGNOSIS — R55 PRE-SYNCOPE: ICD-10-CM

## 2024-09-27 DIAGNOSIS — E23.6 EMPTY SELLA SYNDROME (HCC): ICD-10-CM

## 2024-09-27 DIAGNOSIS — E23.6 EMPTY SELLA SYNDROME (HCC): Primary | ICD-10-CM

## 2024-09-27 LAB
T4 FREE SERPL-MCNC: 0.76 NG/DL (ref 0.61–1.12)
TSH SERPL DL<=0.05 MIU/L-ACNC: 0.57 UIU/ML (ref 0.45–4.5)

## 2024-09-27 PROCEDURE — 84305 ASSAY OF SOMATOMEDIN: CPT | Performed by: INTERNAL MEDICINE

## 2024-09-27 PROCEDURE — 76536 US EXAM OF HEAD AND NECK: CPT

## 2024-09-27 PROCEDURE — 36415 COLL VENOUS BLD VENIPUNCTURE: CPT | Performed by: INTERNAL MEDICINE

## 2024-09-27 PROCEDURE — 99244 OFF/OP CNSLTJ NEW/EST MOD 40: CPT | Performed by: INTERNAL MEDICINE

## 2024-09-27 PROCEDURE — 84439 ASSAY OF FREE THYROXINE: CPT | Performed by: INTERNAL MEDICINE

## 2024-09-27 PROCEDURE — 84443 ASSAY THYROID STIM HORMONE: CPT

## 2024-09-27 NOTE — PROGRESS NOTES
9/27/2024    Assessment & Plan      Diagnoses and all orders for this visit:    Empty sella syndrome (HCC)  -     T4, free  -     TSH, 3rd generation; Future  -     Insulin-like growth factor 1 (IGF-1)    Pre-syncope  -     Ambulatory referral to Endocrinology  -     T4, free  -     TSH, 3rd generation; Future  -     Insulin-like growth factor 1 (IGF-1)    Thyroid nodule  -     T4, free  -     TSH, 3rd generation; Future  -     US thyroid; Future        Assessment & Plan  1. Empty sella syndrome.  The biggest problem with empty sella syndrome often is hormonal deficiencies. At the moment, she does not appear to have any hormonal deficiencies, and her cortisol level is normal, which would be the only thing that is worrisome when it comes to presyncope episodes. It is not believed that her empty sella has anything to do with this episode. The workup will be completed by doing an IGF-I level, TSH, and free T4. There is no contraindication to her resuming her position at work as a . 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    2. Thyroid nodule.  She apparently had a thyroid nodule biopsied several times in the past and there has been no recent follow-up. TSH and free T4 will be done, and a thyroid ultrasound will be checked at her earliest convenience.    Follow-up will be determined based on the blood work and ultrasound.    I have spent a total time of 55 minutes in caring for this patient on the day of the visit/encounter including Diagnostic results, Prognosis, Risks and benefits of tx options, Instructions for management, Patient and family education, Importance of tx compliance, Risk factor reductions, Impressions, Counseling / Coordination of care, Documenting in the medical record, Reviewing / ordering tests, medicine, procedures  , and Obtaining or reviewing history  .    CC: Pituitary consult    History of Present Illness    HPI: Zhane García is a 59-year-old female here for  "evaluation and consultation regarding her pituitary due to a history of a partially empty sella.    She was diagnosed with empty sella syndrome and was advised to consult with me before resuming work.  The partially empty sella syndrome was noted on an MRI in 2021 which was done to evaluate headache and vertigo.    She reports no issues with her vision, including blurriness, double vision, or peripheral vision loss. She denies experiencing lightheadedness, dizziness, or headaches. Magnesium was prescribed for her headaches, which have since subsided. She does not report fatigue, dry skin, nail breakage, or hair loss. She denies any breast discharge or changes in shoe size. She denies difficulty swallowing or food getting stuck in her throat. She does not believe she suffers from anxiety or depression. She denies muscle weakness.  She denies nausea except with her vertigo, decrease in appetite, or abdominal pain.    She has been experiencing heart palpitations since bertin COVID-19, which worsened due to stress related to her father's health. Despite normal results from heart tests and an EEG, she has been out of work for 2.5 months. Her heart palpitations have returned to normal.    She experiences hot flashes at night, often waking up soaked in sweat. She has a history of colitis and has not menstruated since her early 30s. She recently lost 20 pounds due to increased activity at home.    She experiences joint pain in her left knee and numbness and tingling in her thighs due to neuropathy, which she attributes to her weight and prolonged sitting.    FAMILY HISTORY  Her mother had diabetes. Her brother has mild diabetes.        Historical Information   Past Medical History:   Diagnosis Date    Colitis     Diverticulitis     Dizziness     HL (hearing loss)     Psoriasis     Stroke (HCC)     unknown origin of 3 \"mini strokes\"  2008, 2012, [facility]     Tinnitus      Past Surgical History: "   Procedure Laterality Date    ADENOIDECTOMY      CHOLECYSTECTOMY      COLONOSCOPY      DILATION AND CURETTAGE OF UTERUS      MO COLONOSCOPY FLX DX W/COLLJ SPEC WHEN PFRMD N/A 4/16/2018    Procedure: COLONOSCOPY;  Surgeon: Negar Patton MD;  Location: [facility];  Service: Gastroenterology    TONSILLECTOMY       Social History   Social History     Substance and Sexual Activity   Alcohol Use No     Social History     Substance and Sexual Activity   Drug Use No     Social History     Tobacco Use   Smoking Status Former   Smokeless Tobacco Never     Family History:   Family History   Problem Relation Age of Onset    Diabetes type II Mother     Heart failure Mother     Prostate cancer Father 67    Heart disease Father     No Known Problems Sister     No Known Problems Sister     Diabetes type II Brother     No Known Problems Maternal Aunt     Breast cancer Paternal Aunt 52    Colon cancer Paternal Aunt 80    Colon cancer Paternal Uncle         colon ca    No Known Problems Maternal Grandmother     No Known Problems Maternal Grandfather     No Known Problems Paternal Grandmother     No Known Problems Paternal Grandfather     No Known Problems Daughter     No Known Problems Daughter     BRCA1 Positive Cousin     BRCA1 Positive Cousin        Meds/Allergies   Current Outpatient Medications   Medication Sig Dispense Refill    ASPIRIN 81 PO Take 81 mg by mouth      Cholecalciferol 50 MCG (2000 UT) TABS Take 1 capsule by mouth Daily      diazepam (VALIUM) 5 mg tablet if needed      diphenhydrAMINE (Benadryl Allergy) 25 mg capsule take 1 capsule by oral route  at bedtime as needed      Magnesium 400 MG TABS Take by mouth      meclizine (ANTIVERT) 25 mg tablet Take 25 mg by mouth 3 (three) times a day as needed      mometasone (ELOCON) 0.1 % lotion Apply topically if needed      Wegovy 0.5 MG/0.5ML Inject 0.5 mg under the skin once a week       No current facility-administered medications for this visit.     No Known  "Allergies    Objective   Vitals: Blood pressure 122/80, pulse 92, height 5' 4\" (1.626 m), weight 109 kg (239 lb 6.4 oz), not currently breastfeeding.  Invasive Devices       None                   Physical Exam    No lid lag, stare, proptosis, or periorbital edema in the eyes. Visual fields are intact to confrontation. No moon facies observed.  Thyroid in the neck is normal in size. A 2 to 3 cm thyroid nodule is palpable in the right lobe of the thyroid. No lymphadenopathy detected. Mild supraclavicular fat pad on the left side of the neck, but not the right, and Berlin hump noted.  Lungs are clear to auscultation.  Heart exhibits a regular rate and rhythm. No murmurs detected.  No tremor in the outstretched hands. No lower extremity edema. Patellar deep tendon reflexes are normal.  No hirsutism and no violaceous striae on the skin.      The history was obtained from the review of the chart and from the patient.    Lab Results:   Blood work performed on 8/8/2024 at 8:52 AM showed an ACTH level of 15.6 with an a.m. cortisol of 16.    Prolactin is 5.46.    LH is 32.1 with an FSH of 79.5.    CBC is normal.    CMP is normal with a sodium of 142 and a potassium of 4.7.    TSH 0.764.      Lab Results   Component Value Date    CREATININE 0.83 08/08/2024    CREATININE 0.80 04/13/2024    CREATININE 0.85 12/24/2021    BUN 18 08/08/2024     11/07/2015    K 4.7 08/08/2024     08/08/2024    CO2 29 08/08/2024     eGFR   Date Value Ref Range Status   08/08/2024 77 ml/min/1.73sq m Final         Lab Results   Component Value Date    CHOL 201 01/16/2014    HDL 50 04/13/2024    TRIG 123 04/13/2024       Lab Results   Component Value Date    ALT 29 08/08/2024    AST 18 08/08/2024    ALKPHOS 63 08/08/2024    BILITOT 0.37 11/07/2015       Lab Results   Component Value Date    FREET4 0.92 06/19/2017             Future Appointments   Date Time Provider Department Center   2/11/2025  3:10 PM Sergio Bedoya MD [facility] " [facility]

## 2024-09-27 NOTE — PATIENT INSTRUCTIONS
The pituitary blood work is all normal.     I will complete the workup with blood work.    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     I want a thyroid ultrasound just so we follow up on the nodule.     Follow up to be determined.

## 2024-09-30 LAB — IGF-I SERPL-MCNC: 125 NG/ML (ref 60–207)

## 2024-10-01 DIAGNOSIS — E04.1 THYROID NODULE: Primary | ICD-10-CM

## 2024-10-01 DIAGNOSIS — E23.6 EMPTY SELLA SYNDROME (HCC): ICD-10-CM

## 2024-10-02 ENCOUNTER — TELEPHONE (OUTPATIENT)
Age: 59
End: 2024-10-02

## 2024-10-02 NOTE — TELEPHONE ENCOUNTER
Patient called in asking if her ultrasound result was reviewed yet. Patient states that she did see the report on her Mychart.  She said she did not know she had 2 nodules, and she said she is concerned with the density of the smaller one.  Patient asks for a call back, but after 2:30 pm after the ultrasound is reviewed by provider.

## 2024-10-02 NOTE — TELEPHONE ENCOUNTER
I was able to speak to the patient and she stated that she would just like to go ahead with the biopsy to put her mind at ease.  Please place the order and when done we will need to call the patient back after 230 pm to make her aware.

## 2024-10-02 NOTE — TELEPHONE ENCOUNTER
The larger 1 which is probably the one that was biopsied in the past is a nonworrisome nodule because it is what is called spongiform.  These tend to be benign.  The smaller 1 which is a bit more dense and solid is a T RADS 4 nodule and we worry about those when they are over 1.5 cm.  We tend to follow the ultrasound over time and repeated in about 1 or 2 year.  If she is worried about this 1, it is 1.4 cm which is just at the cusp below the point when we biopsy it.  We can biopsy it if she is worried about it.  I am not overly concerned about it because the 3 most worrisome characteristics which are calcifications, border, and taller than wide are not positive.  Have her let me know if she is worried and she wants to have it biopsied and I can that order in.

## 2024-10-03 DIAGNOSIS — E04.1 THYROID NODULE: Primary | ICD-10-CM

## 2024-10-17 NOTE — NURSING NOTE
Call placed to patient to discuss upcoming appointment at [facility] radiology department and complete consultation with patient. Patient is having a thyroid biopsy  utilizing US  guidance. Reviewed  patient's allergies, current anticoagulant medication of ASA 81 mg present per patient but not required to stop per periprocedural management of coagulation status and hemostasis risk in percutaneous image guided procedure guidelines, patient has had procedure in the past, no questions when asked if any questions or concerns. Patient made aware of need for  post procedure if anti anxiety medication is taken, per patient she will not be taking any medications to prevent her from driving.Reminded patient of location and time expected for procedure, Patient expressed understanding by verbalizing and repeating instructions.

## 2024-10-24 ENCOUNTER — HOSPITAL ENCOUNTER (OUTPATIENT)
Dept: RADIOLOGY | Facility: HOSPITAL | Age: 59
Discharge: HOME | End: 2024-10-24
Attending: INTERNAL MEDICINE
Payer: COMMERCIAL

## 2024-10-24 DIAGNOSIS — E04.1 THYROID NODULE: ICD-10-CM

## 2024-10-24 PROCEDURE — 10005 FNA BX W/US GDN 1ST LES: CPT

## 2024-10-24 PROCEDURE — 88173 CYTOPATH EVAL FNA REPORT: CPT | Performed by: STUDENT IN AN ORGANIZED HEALTH CARE EDUCATION/TRAINING PROGRAM

## 2024-10-24 RX ORDER — LIDOCAINE HYDROCHLORIDE 10 MG/ML
2 INJECTION, SOLUTION EPIDURAL; INFILTRATION; INTRACAUDAL; PERINEURAL ONCE
Status: COMPLETED | OUTPATIENT
Start: 2024-10-24 | End: 2024-10-24

## 2024-10-24 RX ADMIN — LIDOCAINE HYDROCHLORIDE 2 ML: 10 INJECTION, SOLUTION EPIDURAL; INFILTRATION; INTRACAUDAL; PERINEURAL at 11:00

## 2024-10-29 PROCEDURE — 88173 CYTOPATH EVAL FNA REPORT: CPT | Performed by: STUDENT IN AN ORGANIZED HEALTH CARE EDUCATION/TRAINING PROGRAM

## 2024-11-04 ENCOUNTER — TELEPHONE (OUTPATIENT)
Dept: ENDOCRINOLOGY | Facility: HOSPITAL | Age: 59
End: 2024-11-04

## 2024-11-04 NOTE — TELEPHONE ENCOUNTER
----- Message from Andree Hendrix MD sent at 11/1/2024  2:12 PM EDT -----  IS the throat soreness and swallowing issues getting better? If she is breathing ok, she is getting enough oxygen.     It could be that the area is swollen from the biopsy, cold compresses and antiinflammatories may help.

## 2024-11-06 NOTE — TELEPHONE ENCOUNTER
Patient returning call and made aware:     Message from Andree Hendrix MD sent at 11/1/2024  2:12 PM EDT -----  IS the throat soreness and swallowing issues getting better? If she is breathing ok, she is getting enough oxygen.      It could be that the area is swollen from the biopsy, cold compresses and antiinflammatories may help    Patient verbalized understanding. States she feel like she has an infection because she has a sore throat,. States if she speaks louder her voice cracks and she gets hoarse. Patient asking if she should see PCP to see if she has an infection

## 2025-04-25 ENCOUNTER — NURSE TRIAGE (OUTPATIENT)
Age: 60
End: 2025-04-25

## 2025-04-25 NOTE — TELEPHONE ENCOUNTER
"FOLLOW UP: Please advise. Next avail appt not until end of Sept.  Can an urgent visit be used? Any recommendations?    REASON FOR CONVERSATION: Abdominal Pain    SYMPTOMS: Lower abd pain, 6/10 x 1 wk, mucous and diarrhea stools    OTHER: Pt last seen in 2022.  She is not taking anything for the pain.     DISPOSITION: Discuss with Provider and Call Back Patient (overriding See Today in Office)  Also reviewed ED precautions.     Reason for Disposition   MODERATE pain (e.g., interferes with normal activities that comes and goes (cramps) lasts > 24 hours  (Exception: Pain with Vomiting or Diarrhea - see that Protocol.)    Answer Assessment - Initial Assessment Questions  1. LOCATION: \"Where does it hurt?\"       Lower abd   2. RADIATION: \"Does the pain shoot anywhere else?\" (e.g., chest, back)      No   3. ONSET: \"When did the pain begin?\" (e.g., minutes, hours or days ago)       Last week   4. SUDDEN: \"Gradual or sudden onset?\"      Gradual   5. PATTERN \"Does the pain come and go, or is it constant?\"      Constant today   6. SEVERITY: \"How bad is the pain?\"  (e.g., Scale 1-10; mild, moderate, or severe)      6  7. RECURRENT SYMPTOM: \"Have you ever had this type of stomach pain before?\" If Yes, ask: \"When was the last time?\" and \"What happened that time?\"       Yes   8. CAUSE: \"What do you think is causing the stomach pain?\"      Unsure   9. RELIEVING/AGGRAVATING FACTORS: \"What makes it better or worse?\" (e.g., antacids, bending or twisting motion, bowel movement)      no  10. OTHER SYMPTOMS: \"Do you have any other symptoms?\" (e.g., back pain, diarrhea, fever, urination pain, vomiting)        Diarrhea    Protocols used: Abdominal Pain - Female-Adult-[place]    "

## 2025-04-27 ENCOUNTER — HOSPITAL ENCOUNTER (INPATIENT)
Facility: HOSPITAL | Age: 60
LOS: 3 days | Discharge: HOME | DRG: 372 | End: 2025-04-30
Attending: EMERGENCY MEDICINE | Admitting: INTERNAL MEDICINE
Payer: COMMERCIAL

## 2025-04-27 ENCOUNTER — APPOINTMENT (EMERGENCY)
Dept: CT IMAGING | Facility: HOSPITAL | Age: 60
DRG: 372 | End: 2025-04-27
Payer: COMMERCIAL

## 2025-04-27 DIAGNOSIS — K52.9 COLITIS: Primary | ICD-10-CM

## 2025-04-27 DIAGNOSIS — R10.9 ABDOMINAL PAIN: ICD-10-CM

## 2025-04-27 DIAGNOSIS — R19.7 BLOODY DIARRHEA: ICD-10-CM

## 2025-04-27 DIAGNOSIS — A04.72 C. DIFFICILE COLITIS: ICD-10-CM

## 2025-04-27 PROBLEM — G89.29 ACUTE ON CHRONIC BACK PAIN: Status: ACTIVE | Noted: 2025-04-27

## 2025-04-27 PROBLEM — M54.9 ACUTE ON CHRONIC BACK PAIN: Status: ACTIVE | Noted: 2025-04-27

## 2025-04-27 PROBLEM — K92.1 HEMATOCHEZIA: Status: ACTIVE | Noted: 2025-04-27

## 2025-04-27 PROBLEM — K57.92 DIVERTICULITIS: Status: ACTIVE | Noted: 2017-12-22

## 2025-04-27 LAB
ALBUMIN SERPL BCG-MCNC: 4.4 G/DL (ref 3.5–5)
ALP SERPL-CCNC: 69 U/L (ref 34–104)
ALT SERPL W P-5'-P-CCNC: 39 U/L (ref 7–52)
ANION GAP SERPL CALCULATED.3IONS-SCNC: 10 MMOL/L (ref 4–13)
AST SERPL W P-5'-P-CCNC: 25 U/L (ref 13–39)
BASOPHILS # BLD AUTO: 0.03 THOUSANDS/ÂΜL (ref 0–0.1)
BASOPHILS NFR BLD AUTO: 0 % (ref 0–1)
BILIRUB SERPL-MCNC: 0.65 MG/DL (ref 0.2–1)
BUN SERPL-MCNC: 17 MG/DL (ref 5–25)
CALCIUM SERPL-MCNC: 9.1 MG/DL (ref 8.4–10.2)
CHLORIDE SERPL-SCNC: 105 MMOL/L (ref 96–108)
CO2 SERPL-SCNC: 23 MMOL/L (ref 21–32)
CREAT SERPL-MCNC: 0.87 MG/DL (ref 0.6–1.3)
EOSINOPHIL # BLD AUTO: 0.12 THOUSAND/ÂΜL (ref 0–0.61)
EOSINOPHIL NFR BLD AUTO: 1 % (ref 0–6)
ERYTHROCYTE [DISTWIDTH] IN BLOOD BY AUTOMATED COUNT: 14 % (ref 11.6–15.1)
GFR SERPL CREATININE-BSD FRML MDRD: 73 ML/MIN/1.73SQ M
GLUCOSE SERPL-MCNC: 85 MG/DL (ref 65–140)
HCT VFR BLD AUTO: 43.5 % (ref 34.8–46.1)
HGB BLD-MCNC: 14.1 G/DL (ref 11.5–15.4)
IMM GRANULOCYTES # BLD AUTO: 0.03 THOUSAND/UL (ref 0–0.2)
IMM GRANULOCYTES NFR BLD AUTO: 0 % (ref 0–2)
LACTATE SERPL-SCNC: 0.9 MMOL/L (ref 0.5–2)
LIPASE SERPL-CCNC: 16 U/L (ref 11–82)
LYMPHOCYTES # BLD AUTO: 2.6 THOUSANDS/ÂΜL (ref 0.6–4.47)
LYMPHOCYTES NFR BLD AUTO: 27 % (ref 14–44)
MCH RBC QN AUTO: 30.5 PG (ref 26.8–34.3)
MCHC RBC AUTO-ENTMCNC: 32.4 G/DL (ref 31.4–37.4)
MCV RBC AUTO: 94 FL (ref 82–98)
MONOCYTES # BLD AUTO: 0.97 THOUSAND/ÂΜL (ref 0.17–1.22)
MONOCYTES NFR BLD AUTO: 10 % (ref 4–12)
NEUTROPHILS # BLD AUTO: 6 THOUSANDS/ÂΜL (ref 1.85–7.62)
NEUTS SEG NFR BLD AUTO: 62 % (ref 43–75)
NRBC BLD AUTO-RTO: 0 /100 WBCS
PLATELET # BLD AUTO: 312 THOUSANDS/UL (ref 149–390)
PMV BLD AUTO: 9.4 FL (ref 8.9–12.7)
POTASSIUM SERPL-SCNC: 4.1 MMOL/L (ref 3.5–5.3)
PROT SERPL-MCNC: 7.6 G/DL (ref 6.4–8.4)
RBC # BLD AUTO: 4.63 MILLION/UL (ref 3.81–5.12)
SODIUM SERPL-SCNC: 138 MMOL/L (ref 135–147)
WBC # BLD AUTO: 9.75 THOUSAND/UL (ref 4.31–10.16)

## 2025-04-27 PROCEDURE — 96361 HYDRATE IV INFUSION ADD-ON: CPT

## 2025-04-27 PROCEDURE — 80053 COMPREHEN METABOLIC PANEL: CPT

## 2025-04-27 PROCEDURE — 85025 COMPLETE CBC W/AUTO DIFF WBC: CPT

## 2025-04-27 PROCEDURE — 83690 ASSAY OF LIPASE: CPT

## 2025-04-27 PROCEDURE — 96360 HYDRATION IV INFUSION INIT: CPT

## 2025-04-27 PROCEDURE — 36415 COLL VENOUS BLD VENIPUNCTURE: CPT

## 2025-04-27 PROCEDURE — 74177 CT ABD & PELVIS W/CONTRAST: CPT

## 2025-04-27 PROCEDURE — 99285 EMERGENCY DEPT VISIT HI MDM: CPT

## 2025-04-27 PROCEDURE — 99284 EMERGENCY DEPT VISIT MOD MDM: CPT

## 2025-04-27 PROCEDURE — 83605 ASSAY OF LACTIC ACID: CPT

## 2025-04-27 PROCEDURE — 99223 1ST HOSP IP/OBS HIGH 75: CPT

## 2025-04-27 RX ORDER — ASPIRIN 81 MG/1
81 TABLET, CHEWABLE ORAL DAILY
Status: DISCONTINUED | OUTPATIENT
Start: 2025-04-28 | End: 2025-04-30 | Stop reason: HOSPADM

## 2025-04-27 RX ORDER — ACETAMINOPHEN 10 MG/ML
1000 INJECTION, SOLUTION INTRAVENOUS EVERY 6 HOURS PRN
Status: DISCONTINUED | OUTPATIENT
Start: 2025-04-27 | End: 2025-04-30

## 2025-04-27 RX ORDER — POLYETHYLENE GLYCOL 3350 17 G/17G
17 POWDER, FOR SOLUTION ORAL DAILY PRN
Status: DISCONTINUED | OUTPATIENT
Start: 2025-04-27 | End: 2025-04-30 | Stop reason: HOSPADM

## 2025-04-27 RX ORDER — PANTOPRAZOLE SODIUM 40 MG/10ML
40 INJECTION, POWDER, LYOPHILIZED, FOR SOLUTION INTRAVENOUS EVERY 12 HOURS SCHEDULED
Status: DISCONTINUED | OUTPATIENT
Start: 2025-04-27 | End: 2025-04-30

## 2025-04-27 RX ORDER — ACETAMINOPHEN 325 MG/1
650 TABLET ORAL EVERY 6 HOURS PRN
Status: DISCONTINUED | OUTPATIENT
Start: 2025-04-27 | End: 2025-04-27

## 2025-04-27 RX ORDER — ONDANSETRON 2 MG/ML
4 INJECTION INTRAMUSCULAR; INTRAVENOUS EVERY 6 HOURS PRN
Status: DISCONTINUED | OUTPATIENT
Start: 2025-04-27 | End: 2025-04-30 | Stop reason: HOSPADM

## 2025-04-27 RX ORDER — MAGNESIUM HYDROXIDE/ALUMINUM HYDROXICE/SIMETHICONE 120; 1200; 1200 MG/30ML; MG/30ML; MG/30ML
30 SUSPENSION ORAL EVERY 6 HOURS PRN
Status: DISCONTINUED | OUTPATIENT
Start: 2025-04-27 | End: 2025-04-30 | Stop reason: HOSPADM

## 2025-04-27 RX ORDER — SODIUM CHLORIDE, SODIUM GLUCONATE, SODIUM ACETATE, POTASSIUM CHLORIDE, MAGNESIUM CHLORIDE, SODIUM PHOSPHATE, DIBASIC, AND POTASSIUM PHOSPHATE .53; .5; .37; .037; .03; .012; .00082 G/100ML; G/100ML; G/100ML; G/100ML; G/100ML; G/100ML; G/100ML
75 INJECTION, SOLUTION INTRAVENOUS CONTINUOUS
Status: DISCONTINUED | OUTPATIENT
Start: 2025-04-27 | End: 2025-04-29

## 2025-04-27 RX ORDER — LIDOCAINE 50 MG/G
1 PATCH TOPICAL DAILY
Status: DISCONTINUED | OUTPATIENT
Start: 2025-04-27 | End: 2025-04-30 | Stop reason: HOSPADM

## 2025-04-27 RX ORDER — METRONIDAZOLE 500 MG/100ML
500 INJECTION, SOLUTION INTRAVENOUS EVERY 12 HOURS
Status: DISCONTINUED | OUTPATIENT
Start: 2025-04-28 | End: 2025-04-28

## 2025-04-27 RX ORDER — ONDANSETRON 2 MG/ML
4 INJECTION INTRAMUSCULAR; INTRAVENOUS EVERY 6 HOURS PRN
Status: DISCONTINUED | OUTPATIENT
Start: 2025-04-27 | End: 2025-04-27

## 2025-04-27 RX ADMIN — ACETAMINOPHEN 650 MG: 325 TABLET, FILM COATED ORAL at 18:29

## 2025-04-27 RX ADMIN — IOHEXOL 100 ML: 350 INJECTION, SOLUTION INTRAVENOUS at 19:16

## 2025-04-27 RX ADMIN — PIPERACILLIN AND TAZOBACTAM 4.5 G: 36; 4.5 INJECTION, POWDER, LYOPHILIZED, FOR SOLUTION INTRAVENOUS at 21:40

## 2025-04-27 RX ADMIN — SODIUM CHLORIDE 1000 ML: 0.9 INJECTION, SOLUTION INTRAVENOUS at 18:35

## 2025-04-27 RX ADMIN — SODIUM CHLORIDE 1000 ML: 0.9 INJECTION, SOLUTION INTRAVENOUS at 21:40

## 2025-04-27 RX ADMIN — PANTOPRAZOLE SODIUM 40 MG: 40 INJECTION, POWDER, FOR SOLUTION INTRAVENOUS at 22:38

## 2025-04-27 NOTE — Clinical Note
Case was discussed with AN SLIM and the patient's admission status was agreed to be Admission Status: inpatient status to the service of Dr. Caldera .

## 2025-04-27 NOTE — ED PROVIDER NOTES
Time reflects when diagnosis was documented in both MDM as applicable and the Disposition within this note       Time User Action Codes Description Comment    4/27/2025  8:21 PM Maxi Almanzar Add [K52.9] Colitis     4/27/2025  8:51 PM Andres Almanzaruel Add [K92.1] Hematochezia     4/27/2025  8:51 PM Andres Almanzaruel Remove [K92.1] Hematochezia     4/27/2025  8:51 PM JenelleAndresMaxi Add [R19.7] Bloody diarrhea     4/27/2025  8:51 PM Andres Almanzaruel Add [R10.9] Abdominal pain           ED Disposition       ED Disposition   Admit    Condition   Stable    Date/Time   Sun Apr 27, 2025  9:18 PM    Comment   Case was discussed with AN SLIM and the patient's admission status was agreed to be Admission Status: inpatient status to the service of Dr. Tompkins.               Assessment & Plan       Medical Decision Making  60-year-old female presenting today with concerns of abdominal pain, bloody diarrhea over the last 5 days.  History of ischemic colitis.  On exam, patient uncomfortable, tenderness and guarding to palpation of the abdomen.  Labs.  Stool studies.  CT abd pelv with contrast. Lactic acid.  As needed pain medication and nausea medication, currently denying needing anything for pain.  Labs reassuring.  CT with concerns of colitis, unspecified type. With concerns of ischemic colitis, reached out to general surgery for evaluation.   General surgery did evaluate, no emergent surgery at this time but would like to admit, start IV fluids and IV antibiotics.  Admit to medicine for GI consult and general surgery consult and inpatient admission.  The patient was contacted,  Agreeable to admission.  Patient at time of admission was stable.    Amount and/or Complexity of Data Reviewed  Labs: ordered. Decision-making details documented in ED Course.  Radiology: ordered.    Risk  OTC drugs.  Prescription drug management.  Decision regarding hospitalization.        ED Course as of 04/27/25 2121   Sun Apr 27, 2025   1849 WBC: 9.75   1849  "Hemoglobin: 14.1   1849 Platelet Count: 312   2115 I spoke with general surgery, no need for emergent surgery right now, but would like to admit, give IV fluids and IV antibiotics, preferably Zosyn, and medicine admit, GI consult, surgical consult as well.       Medications   acetaminophen (TYLENOL) tablet 650 mg (650 mg Oral Given 4/27/25 1829)   ondansetron (ZOFRAN) injection 4 mg (4 mg Intravenous Not Given 4/27/25 1830)   sodium chloride 0.9 % bolus 1,000 mL (has no administration in time range)   piperacillin-tazobactam (ZOSYN) IVPB 4.5 g (has no administration in time range)   sodium chloride 0.9 % bolus 1,000 mL (1,000 mL Intravenous New Bag 4/27/25 1835)   iohexol (OMNIPAQUE) 350 MG/ML injection (MULTI-DOSE) 100 mL (100 mL Intravenous Given 4/27/25 1916)       ED Risk Strat Scores                    No data recorded                            History of Present Illness       Chief Complaint   Patient presents with    Diarrhea     Pt reports diarrhea over a week, reports blood and other drainage in stool, denies n/v, abd/lower back pain       Past Medical History:   Diagnosis Date    Colitis     Diverticulitis     Dizziness     HL (hearing loss)     Psoriasis     Stroke (HCC)     unknown origin of 3 \"mini strokes\"  2008, 2012, [facility] and [facility]     Tinnitus       Past Surgical History:   Procedure Laterality Date    ADENOIDECTOMY      CHOLECYSTECTOMY      COLONOSCOPY      DILATION AND CURETTAGE OF UTERUS      IA COLONOSCOPY FLX DX W/COLLJ SPEC WHEN PFRMD N/A 4/16/2018    Procedure: COLONOSCOPY;  Surgeon: Negar Patton MD;  Location: [facility];  Service: Gastroenterology    TONSILLECTOMY      US GUIDED THYROID BIOPSY  10/24/2024      Family History   Problem Relation Age of Onset    Diabetes type II Mother     Heart failure Mother     Prostate cancer Father 67    Heart disease Father     No Known Problems Sister     No Known Problems Sister     Diabetes type II Brother     No Known Problems " Maternal Aunt     Breast cancer Paternal Aunt 52    Colon cancer Paternal Aunt 80    Colon cancer Paternal Uncle         colon ca    No Known Problems Maternal Grandmother     No Known Problems Maternal Grandfather     No Known Problems Paternal Grandmother     No Known Problems Paternal Grandfather     No Known Problems Daughter     No Known Problems Daughter     BRCA1 Positive Cousin     BRCA1 Positive Cousin       Social History     Tobacco Use    Smoking status: Former    Smokeless tobacco: Never   Vaping Use    Vaping status: Never Used   Substance Use Topics    Alcohol use: No    Drug use: No      E-Cigarette/Vaping    E-Cigarette Use Never User       E-Cigarette/Vaping Substances      I have reviewed and agree with the history as documented.     60-year-old female presents today with concerns of abdominal pain for the last for 5 days and bloody bowel movements with tissue passage.  Has not been able to eat or drink as she has been somewhat nauseous.  She does have a history of ischemic colitis and this feels somewhat similar.  No urinary symptoms.  No vaginal bleeding.  No flank or back pain.  No chest pain or shortness of breath.  She denies any fever or chills.  She has felt somewhat fatigued as of late.        Review of Systems   Constitutional:  Positive for fatigue. Negative for chills and fever.   HENT:  Negative for ear pain and sore throat.    Eyes:  Negative for pain and visual disturbance.   Respiratory:  Negative for cough and shortness of breath.    Cardiovascular:  Negative for chest pain and palpitations.   Gastrointestinal:  Positive for abdominal pain, blood in stool, diarrhea and nausea. Negative for abdominal distention, anal bleeding, constipation, rectal pain and vomiting.   Genitourinary:  Negative for dysuria and hematuria.   Musculoskeletal:  Negative for arthralgias and back pain.   Skin:  Negative for color change and rash.   Neurological:  Negative for seizures and syncope.   All  other systems reviewed and are negative.          Objective       ED Triage Vitals [04/27/25 1750]   Temperature Pulse Blood Pressure Respirations SpO2 Patient Position - Orthostatic VS   97.8 °F (36.6 °C) 102 141/66 18 95 % Sitting      Temp Source Heart Rate Source BP Location FiO2 (%) Pain Score    Oral Monitor Right arm -- --      Vitals      Date and Time Temp Pulse SpO2 Resp BP Pain Score FACES Pain Rating User   04/27/25 2119 -- 84 99 % -- 158/70 -- -- PS   04/27/25 1750 97.8 °F (36.6 °C) 102 95 % 18 141/66 -- -- AW            Physical Exam  Vitals and nursing note reviewed.   Constitutional:       General: She is not in acute distress.     Appearance: She is well-developed. She is ill-appearing (Cramping stomach secondary to pain).   HENT:      Head: Normocephalic and atraumatic.   Eyes:      Conjunctiva/sclera: Conjunctivae normal.   Cardiovascular:      Rate and Rhythm: Normal rate and regular rhythm.      Pulses: Normal pulses.      Heart sounds: Normal heart sounds. No murmur heard.     No friction rub. No gallop.   Pulmonary:      Effort: Pulmonary effort is normal. No respiratory distress.      Breath sounds: Normal breath sounds.   Abdominal:      General: There is distension.      Palpations: Abdomen is soft. There is no mass.      Tenderness: There is abdominal tenderness. There is guarding. There is no right CVA tenderness, left CVA tenderness or rebound.      Hernia: No hernia is present.   Musculoskeletal:         General: No swelling.      Cervical back: Neck supple.      Right lower leg: No edema.      Left lower leg: No edema.   Skin:     General: Skin is warm and dry.      Capillary Refill: Capillary refill takes less than 2 seconds.   Neurological:      Mental Status: She is alert.   Psychiatric:         Mood and Affect: Mood normal.         Results Reviewed       Procedure Component Value Units Date/Time    Lactic acid, plasma (w/reflex if result > 2.0) [[id number]]  (Normal) Collected:  04/27/25 1835    Lab Status: Final result Specimen: Blood from Arm, Left Updated: 04/27/25 1910     LACTIC ACID 0.9 mmol/L     Narrative:      Result may be elevated if tourniquet was used during collection.    Lipase [[id number]]  (Normal) Collected: 04/27/25 1835    Lab Status: Final result Specimen: Blood from Arm, Left Updated: 04/27/25 1910     Lipase 16 u/L     Comprehensive metabolic panel [[id number]] Collected: 04/27/25 1835    Lab Status: Final result Specimen: Blood from Arm, Left Updated: 04/27/25 1910     Sodium 138 mmol/L      Potassium 4.1 mmol/L      Chloride 105 mmol/L      CO2 23 mmol/L      ANION GAP 10 mmol/L      BUN 17 mg/dL      Creatinine 0.87 mg/dL      Glucose 85 mg/dL      Calcium 9.1 mg/dL      AST 25 U/L      ALT 39 U/L      Alkaline Phosphatase 69 U/L      Total Protein 7.6 g/dL      Albumin 4.4 g/dL      Total Bilirubin 0.65 mg/dL      eGFR 73 ml/min/1.73sq m     Narrative:      National Kidney Disease Foundation guidelines for Chronic Kidney Disease (CKD):     Stage 1 with normal or high GFR (GFR > 90 mL/min/1.73 square meters)    Stage 2 Mild CKD (GFR = 60-89 mL/min/1.73 square meters)    Stage 3A Moderate CKD (GFR = 45-59 mL/min/1.73 square meters)    Stage 3B Moderate CKD (GFR = 30-44 mL/min/1.73 square meters)    Stage 4 Severe CKD (GFR = 15-29 mL/min/1.73 square meters)    Stage 5 End Stage CKD (GFR <15 mL/min/1.73 square meters)  Note: GFR calculation is accurate only with a steady state creatinine    CBC and differential [[id number]] Collected: 04/27/25 1835    Lab Status: Final result Specimen: Blood from Arm, Left Updated: 04/27/25 1849     WBC 9.75 Thousand/uL      RBC 4.63 Million/uL      Hemoglobin 14.1 g/dL      Hematocrit 43.5 %      MCV 94 fL      MCH 30.5 pg      MCHC 32.4 g/dL      RDW 14.0 %      MPV 9.4 fL      Platelets 312 Thousands/uL      nRBC 0 /100 WBCs      Segmented % 62 %      Immature Grans % 0 %      Lymphocytes % 27 %      Monocytes % 10 %      Eosinophils  Relative 1 %      Basophils Relative 0 %      Absolute Neutrophils 6.00 Thousands/µL      Absolute Immature Grans 0.03 Thousand/uL      Absolute Lymphocytes 2.60 Thousands/µL      Absolute Monocytes 0.97 Thousand/µL      Eosinophils Absolute 0.12 Thousand/µL      Basophils Absolute 0.03 Thousands/µL     Stool Enteric Bacterial Panel by PCR [[id number]]     Lab Status: No result Specimen: Stool     Clostridioides difficile toxin by PCR with EIA [[id number]]     Lab Status: No result Specimen: Stool             CT abdomen pelvis with contrast   Final Interpretation by Rolando Barba MD (04/27 2017)      1.  There is diffuse wall thickening throughout the descending and proximal sigmoid colon, with loss of haustration. This is consistent with a nonspecific colitis. The differential diagnosis includes infectious, inflammatory and ischemic etiologies.   2.  Please refer to the report body for description of other incidental, chronic and/or benign findings.         Workstation performed: [id number]             Procedures    ED Medication and Procedure Management   Prior to Admission Medications   Prescriptions Last Dose Informant Patient Reported? Taking?   ASPIRIN 81 PO  Self Yes No   Sig: Take 81 mg by mouth   Cholecalciferol 50 MCG (2000 UT) TABS  Self Yes No   Sig: Take 1 capsule by mouth Daily   Magnesium 400 MG TABS  Self Yes No   Sig: Take by mouth   Wegovy 0.5 MG/0.5ML  Self Yes No   Sig: Inject 0.5 mg under the skin once a week   diazepam (VALIUM) 5 mg tablet  Self Yes No   Sig: if needed   diphenhydrAMINE (Benadryl Allergy) 25 mg capsule  Self Yes No   Sig: take 1 capsule by oral route  at bedtime as needed   meclizine (ANTIVERT) 25 mg tablet  Self Yes No   Sig: Take 25 mg by mouth 3 (three) times a day as needed   mometasone (ELOCON) 0.1 % lotion  Self Yes No   Sig: Apply topically if needed      Facility-Administered Medications: None     Patient's Medications   Discharge Prescriptions    No medications on  file     No discharge procedures on file.  ED SEPSIS DOCUMENTATION   Time reflects when diagnosis was documented in both MDM as applicable and the Disposition within this note       Time User Action Codes Description Comment    4/27/2025  8:21 PM Maxi Almanzar [K52.9] Colitis     4/27/2025  8:51 PM Maxi Almanzar [K92.1] Hematochezia     4/27/2025  8:51 PM Maxi Almanzar Remove [K92.1] Hematochezia     4/27/2025  8:51 PM Maxi Almanzar [R19.7] Bloody diarrhea     4/27/2025  8:51 PM Maxi Almanzar [R10.9] Abdominal pain                  Maxi Almanzar PA-C  04/27/25 2129

## 2025-04-28 LAB
ALBUMIN SERPL BCG-MCNC: 3.7 G/DL (ref 3.5–5)
ALP SERPL-CCNC: 55 U/L (ref 34–104)
ALT SERPL W P-5'-P-CCNC: 30 U/L (ref 7–52)
ANION GAP SERPL CALCULATED.3IONS-SCNC: 7 MMOL/L (ref 4–13)
AST SERPL W P-5'-P-CCNC: 20 U/L (ref 13–39)
BASOPHILS # BLD AUTO: 0.03 THOUSANDS/ÂΜL (ref 0–0.1)
BASOPHILS NFR BLD AUTO: 0 % (ref 0–1)
BILIRUB SERPL-MCNC: 0.66 MG/DL (ref 0.2–1)
BUN SERPL-MCNC: 13 MG/DL (ref 5–25)
C COLI+JEJUNI TUF STL QL NAA+PROBE: NEGATIVE
C DIFF TOX A+B STL QL IA: NEGATIVE
C DIFF TOX GENS STL QL NAA+PROBE: POSITIVE
CALCIUM SERPL-MCNC: 8 MG/DL (ref 8.4–10.2)
CHLORIDE SERPL-SCNC: 108 MMOL/L (ref 96–108)
CO2 SERPL-SCNC: 23 MMOL/L (ref 21–32)
CREAT SERPL-MCNC: 0.83 MG/DL (ref 0.6–1.3)
CRP SERPL QL: 8.8 MG/L
EC STX1+STX2 GENES STL QL NAA+PROBE: NEGATIVE
EOSINOPHIL # BLD AUTO: 0.13 THOUSAND/ÂΜL (ref 0–0.61)
EOSINOPHIL NFR BLD AUTO: 2 % (ref 0–6)
ERYTHROCYTE [DISTWIDTH] IN BLOOD BY AUTOMATED COUNT: 13.8 % (ref 11.6–15.1)
GFR SERPL CREATININE-BSD FRML MDRD: 77 ML/MIN/1.73SQ M
GLUCOSE SERPL-MCNC: 80 MG/DL (ref 65–140)
HCT VFR BLD AUTO: 36.4 % (ref 34.8–46.1)
HCT VFR BLD AUTO: 37 % (ref 34.8–46.1)
HCT VFR BLD AUTO: 37.4 % (ref 34.8–46.1)
HGB BLD-MCNC: 11.8 G/DL (ref 11.5–15.4)
HGB BLD-MCNC: 12.1 G/DL (ref 11.5–15.4)
HGB BLD-MCNC: 12.2 G/DL (ref 11.5–15.4)
IMM GRANULOCYTES # BLD AUTO: 0.03 THOUSAND/UL (ref 0–0.2)
IMM GRANULOCYTES NFR BLD AUTO: 0 % (ref 0–2)
LYMPHOCYTES # BLD AUTO: 2.31 THOUSANDS/ÂΜL (ref 0.6–4.47)
LYMPHOCYTES NFR BLD AUTO: 28 % (ref 14–44)
MAGNESIUM SERPL-MCNC: 2 MG/DL (ref 1.9–2.7)
MCH RBC QN AUTO: 30.8 PG (ref 26.8–34.3)
MCHC RBC AUTO-ENTMCNC: 32.7 G/DL (ref 31.4–37.4)
MCV RBC AUTO: 94 FL (ref 82–98)
MONOCYTES # BLD AUTO: 0.82 THOUSAND/ÂΜL (ref 0.17–1.22)
MONOCYTES NFR BLD AUTO: 10 % (ref 4–12)
NEUTROPHILS # BLD AUTO: 4.88 THOUSANDS/ÂΜL (ref 1.85–7.62)
NEUTS SEG NFR BLD AUTO: 60 % (ref 43–75)
NRBC BLD AUTO-RTO: 0 /100 WBCS
PHOSPHATE SERPL-MCNC: 3.3 MG/DL (ref 2.7–4.5)
PLATELET # BLD AUTO: 246 THOUSANDS/UL (ref 149–390)
PMV BLD AUTO: 9 FL (ref 8.9–12.7)
POTASSIUM SERPL-SCNC: 3.8 MMOL/L (ref 3.5–5.3)
PROT SERPL-MCNC: 6.3 G/DL (ref 6.4–8.4)
RBC # BLD AUTO: 3.93 MILLION/UL (ref 3.81–5.12)
SALMONELLA SP SPAO STL QL NAA+PROBE: NEGATIVE
SHIGELLA SP+EIEC IPAH STL QL NAA+PROBE: NEGATIVE
SODIUM SERPL-SCNC: 138 MMOL/L (ref 135–147)
TSH SERPL DL<=0.05 MIU/L-ACNC: 0.97 UIU/ML (ref 0.45–4.5)
WBC # BLD AUTO: 8.2 THOUSAND/UL (ref 4.31–10.16)

## 2025-04-28 PROCEDURE — 85018 HEMOGLOBIN: CPT | Performed by: PHYSICIAN ASSISTANT

## 2025-04-28 PROCEDURE — 87505 NFCT AGENT DETECTION GI: CPT

## 2025-04-28 PROCEDURE — 84100 ASSAY OF PHOSPHORUS: CPT

## 2025-04-28 PROCEDURE — 80053 COMPREHEN METABOLIC PANEL: CPT

## 2025-04-28 PROCEDURE — 83735 ASSAY OF MAGNESIUM: CPT

## 2025-04-28 PROCEDURE — 36415 COLL VENOUS BLD VENIPUNCTURE: CPT

## 2025-04-28 PROCEDURE — 99254 IP/OBS CNSLTJ NEW/EST MOD 60: CPT | Performed by: INTERNAL MEDICINE

## 2025-04-28 PROCEDURE — 99232 SBSQ HOSP IP/OBS MODERATE 35: CPT | Performed by: INTERNAL MEDICINE

## 2025-04-28 PROCEDURE — 85014 HEMATOCRIT: CPT | Performed by: PHYSICIAN ASSISTANT

## 2025-04-28 PROCEDURE — 84443 ASSAY THYROID STIM HORMONE: CPT

## 2025-04-28 PROCEDURE — 99232 SBSQ HOSP IP/OBS MODERATE 35: CPT | Performed by: SURGERY

## 2025-04-28 PROCEDURE — 85018 HEMOGLOBIN: CPT

## 2025-04-28 PROCEDURE — 85014 HEMATOCRIT: CPT

## 2025-04-28 PROCEDURE — 86140 C-REACTIVE PROTEIN: CPT | Performed by: PHYSICIAN ASSISTANT

## 2025-04-28 PROCEDURE — 85025 COMPLETE CBC W/AUTO DIFF WBC: CPT

## 2025-04-28 RX ORDER — VANCOMYCIN HYDROCHLORIDE 125 MG/1
125 CAPSULE ORAL EVERY 6 HOURS SCHEDULED
Status: DISCONTINUED | OUTPATIENT
Start: 2025-04-28 | End: 2025-04-30 | Stop reason: HOSPADM

## 2025-04-28 RX ADMIN — ASPIRIN 81 MG: 81 TABLET, CHEWABLE ORAL at 08:12

## 2025-04-28 RX ADMIN — METRONIDAZOLE 500 MG: 500 INJECTION, SOLUTION INTRAVENOUS at 05:25

## 2025-04-28 RX ADMIN — SODIUM CHLORIDE, SODIUM GLUCONATE, SODIUM ACETATE, POTASSIUM CHLORIDE, MAGNESIUM CHLORIDE, SODIUM PHOSPHATE, DIBASIC, AND POTASSIUM PHOSPHATE 125 ML/HR: .53; .5; .37; .037; .03; .012; .00082 INJECTION, SOLUTION INTRAVENOUS at 00:07

## 2025-04-28 RX ADMIN — VANCOMYCIN HYDROCHLORIDE 125 MG: 125 CAPSULE ORAL at 17:11

## 2025-04-28 RX ADMIN — VANCOMYCIN HYDROCHLORIDE 125 MG: 125 CAPSULE ORAL at 23:26

## 2025-04-28 RX ADMIN — SODIUM CHLORIDE, SODIUM GLUCONATE, SODIUM ACETATE, POTASSIUM CHLORIDE, MAGNESIUM CHLORIDE, SODIUM PHOSPHATE, DIBASIC, AND POTASSIUM PHOSPHATE 125 ML/HR: .53; .5; .37; .037; .03; .012; .00082 INJECTION, SOLUTION INTRAVENOUS at 12:00

## 2025-04-28 RX ADMIN — PANTOPRAZOLE SODIUM 40 MG: 40 INJECTION, POWDER, FOR SOLUTION INTRAVENOUS at 08:12

## 2025-04-28 RX ADMIN — CEFTRIAXONE 1000 MG: 10 INJECTION, POWDER, FOR SOLUTION INTRAVENOUS at 05:21

## 2025-04-28 NOTE — UTILIZATION REVIEW
Initial Clinical Review    Admission: Date/Time/Statement:   Admission Orders (From admission, onward)       Ordered        04/27/25 2118  INPATIENT ADMISSION  Once                          Orders Placed This Encounter   Procedures    INPATIENT ADMISSION     Standing Status:   Standing     Number of Occurrences:   1     Level of Care:   Med Surg [16]     Estimated length of stay:   More than 2 Midnights     Certification:   I certify that inpatient services are medically necessary for this patient for a duration of greater than two midnights. See H&P and MD Progress Notes for additional information about the patient's course of treatment.     ED Arrival Information       Expected   -    Arrival   4/27/2025 17:45    Acuity   Urgent              Means of arrival   Walk-In    Escorted by   Self    Service   Hospitalist    Admission type   Emergency              Arrival complaint   Abdominal Pain (ischemic colitis)             Chief Complaint   Patient presents with    Diarrhea     Pt reports diarrhea over a week, reports blood and other drainage in stool, denies n/v, abd/lower back pain       Initial Presentation: 60 y.o. female with PMHx of colitis, diverticulitis, dizziness, psoriasis, who presents to ED as a walk-in with 5 days of abdominal pain accompanied by bloody stools. The pt reports pain is moderate to severe in the LLQ. CBC, BMP wnl. CT A/P shows possible colitis.  IVFs, protonix and Zosyn given in ED.  Admitted Inpatient to Med/Surg unit with Abdominal Pain, Colitis --  npo, IVFs. Continue ceftriaxone and flagyl. IV Protonix BID. GI and surgery consulted. Continue PTA po meds. PT/OT evals.    Anticipated Length of Stay/Certification Statement: Patient will be admitted on an inpatient basis with an anticipated length of stay of greater than 2 midnights secondary to abdominal pain and pending GI and general surgery consult.     Surgery consult -- A: Colitis -- Recommend abx, continue npo, IVFs.     Date: 4/28    Day 2: Still having frequent bms. Had an episode of pain this morning which is beginning to improve. Abdomen soft, mild suprapubic tenderness, no rebound or guarding on exam. C-diff pos. Continue Rocephin and Flagyl. Clear liquid diet.     GI consult -- Abdomen pain - Pt with almost 2 wks of abdominal pain followed by 5-7 episodes of nonbloody diarrhea, followed by 7-10 episodes of daily bms.  She did not present to the ED until several days later.  CT with left-sided colitis.  Mild drop in hgb.  Blood in the stool is improving today.  Suspected infectious/ischemic colitis  Pt with improvement of symptoms.  Abdominal exam benign. Offered clear liquid diet which she reports she may hold off of until tomorrow. F/u stool studies. Continue ceftriaxone and Flagyl. Monitor stool output. Antiemetics prn. Monitor Hgb/WBC, can decrease frequency to daily. If continuing to improve with no significant bloody output, lab work stable can likely hold off on inpatient procedure and do op colonoscopy in 6-8 wks.         ED Treatment-Medication Administration from 04/27/2025 1745 to 04/28/2025 0506         Date/Time Order Dose Route Action     04/27/2025 1835 sodium chloride 0.9 % bolus 1,000 mL 1,000 mL Intravenous New Bag     04/27/2025 1829 acetaminophen (TYLENOL) tablet 650 mg 650 mg Oral Given     04/27/2025 2140 sodium chloride 0.9 % bolus 1,000 mL 1,000 mL Intravenous New Bag     04/27/2025 2140 piperacillin-tazobactam (ZOSYN) IVPB 4.5 g 4.5 g Intravenous New Bag     04/28/2025 0007 multi-electrolyte (Plasmalyte-A/Isolyte-S PH 7.4/Normosol-R) IV solution 125 mL/hr Intravenous New Bag     04/27/2025 2238 pantoprazole (PROTONIX) injection 40 mg 40 mg Intravenous Given         Scheduled Medications:  aspirin, 81 mg, Oral, Daily  cefTRIAXone, 1,000 mg, Intravenous, Q24H  lidocaine, 1 patch, Topical, Daily  metroNIDAZOLE, 500 mg, Intravenous, Q12H  pantoprazole, 40 mg, Intravenous, Q12H CARLOS    Continuous IV  Infusions:  multi-electrolyte, 125 mL/hr, Intravenous, Continuous      PRN Meds:  acetaminophen, 1,000 mg, Intravenous, Q6H PRN 4/27 x1  aluminum-magnesium hydroxide-simethicone, 30 mL, Oral, Q6H PRN  ondansetron, 4 mg, Intravenous, Q6H PRN  polyethylene glycol, 17 g, Oral, Daily PRN      ED Triage Vitals   Temperature Pulse Respirations Blood Pressure SpO2 Pain Score   04/27/25 1750 04/27/25 1750 04/27/25 1750 04/27/25 1750 04/27/25 1750 04/28/25 0011   97.8 °F (36.6 °C) 102 18 141/66 95 % 6     Weight (last 2 days)       Date/Time Weight    04/28/25 0514 113 (248.68)            Vital Signs (last 3 days)       Date/Time Temp Pulse Resp BP MAP (mmHg) SpO2 O2 Device Patient Position - Orthostatic VS Cantril Coma Scale Score Pain    04/28/25 1100 98 °F (36.7 °C) -- 18 134/57 77 -- -- Lying -- --    04/28/25 0800 -- -- -- -- -- -- -- -- 15 --    04/28/25 0747 97.8 °F (36.6 °C) -- -- 113/68 83 -- None (Room air) Lying -- --    04/28/25 0734 -- -- -- -- -- -- -- -- -- No Pain    04/28/25 0518 -- -- -- -- -- -- -- -- 15 --    04/28/25 0514 97.6 °F (36.4 °C) 76 18 113/65 81 99 % None (Room air) Lying -- --    04/28/25 0415 -- 72 18 131/61 88 97 % None (Room air) Sitting -- --    04/28/25 0012 -- -- -- -- -- -- -- -- 15 --    04/28/25 0011 97.7 °F (36.5 °C) 78 18 141/68 98 98 % None (Room air) Lying -- 6    04/27/25 2119 -- 84 -- 158/70 101 99 % None (Room air) -- -- --    04/27/25 1750 97.8 °F (36.6 °C) 102 18 141/66 95 95 % None (Room air) Sitting -- --         Pertinent Labs/Diagnostic Test Results:   Radiology:  CT abdomen pelvis with contrast   Final Interpretation by Rolando Barba MD (04/27 2017)      1.  There is diffuse wall thickening throughout the descending and proximal sigmoid colon, with loss of haustration. This is consistent with a nonspecific colitis. The differential diagnosis includes infectious, inflammatory and ischemic etiologies.   2.  Please refer to the report body for description of other  "incidental, chronic and/or benign findings.        Results from last 7 days   Lab Units 04/28/25  1152 04/28/25  0416 04/27/25  1835   WBC Thousand/uL  --  8.20 9.75   HEMOGLOBIN g/dL 11.8 12.1 14.1   HEMATOCRIT % 36.4 37.0 43.5   PLATELETS Thousands/uL  --  246 312   TOTAL NEUT ABS Thousands/µL  --  4.88 6.00     Results from last 7 days   Lab Units 04/28/25  0416 04/27/25  1835   SODIUM mmol/L 138 138   POTASSIUM mmol/L 3.8 4.1   CHLORIDE mmol/L 108 105   CO2 mmol/L 23 23   ANION GAP mmol/L 7 10   BUN mg/dL 13 17   CREATININE mg/dL 0.83 0.87   EGFR ml/min/1.73sq m 77 73   CALCIUM mg/dL 8.0* 9.1   MAGNESIUM mg/dL 2.0  --    PHOSPHORUS mg/dL 3.3  --      Results from last 7 days   Lab Units 04/28/25  0416 04/27/25  1835   AST U/L 20 25   ALT U/L 30 39   ALK PHOS U/L 55 69   TOTAL PROTEIN g/dL 6.3* 7.6   ALBUMIN g/dL 3.7 4.4   TOTAL BILIRUBIN mg/dL 0.66 0.65     Results from last 7 days   Lab Units 04/28/25  0416 04/27/25  1835   GLUCOSE RANDOM mg/dL 80 85     Results from last 7 days   Lab Units 04/28/25  0416   TSH 3RD GENERATON uIU/mL 0.966     Results from last 7 days   Lab Units 04/27/25  1835   LACTIC ACID mmol/L 0.9     Results from last 7 days   Lab Units 04/27/25  1835   LIPASE u/L 16     Results from last 7 days   Lab Units 04/28/25  0416   CRP mg/L 8.8*     Results from last 7 days   Lab Units 04/28/25  0546   C DIFF TOXIN B BY PCR  Positive*         Past Medical History:   Diagnosis Date    Colitis     Diverticulitis     Dizziness     HL (hearing loss)     Psoriasis     Stroke (HCC)     unknown origin of 3 \"mini strokes\"  2008, 2012, [facility]     Tinnitus      Present on Admission:   Colitis   Acute on chronic back pain   GERD (gastroesophageal reflux disease)   Abdominal pain   Hematochezia      Admitting Diagnosis: Colitis [K52.9]  Abdominal pain [R10.9]  Bloody diarrhea [R19.7]  Age/Sex: 60 y.o. female    Network Utilization Review Department  ATTENTION: Please call with any " questions or concerns to [phone] and carefully listen to the prompts so that you are directed to the right person. All voicemails are confidential.   For Discharge needs, contact Care Management DC Support Team at [phone] opt. 2  Send all requests for admission clinical reviews, approved or denied determinations and any other requests to dedicated fax number below belonging to the [place] where the patient is receiving treatment. List of dedicated fax numbers for the Facilities:  FACILITY NAME UR FAX NUMBER   ADMISSION DENIALS (Administrative/Medical Necessity) [phone]   DISCHARGE SUPPORT TEAM (NETWORK) [phone]   PARENT CHILD HEALTH (Maternity/NICU/Pediatrics) [phone]   [facility] [phone]   [facility] [phone]   [facility] [phone]   [facility] [phone]   Psychiatric hospital [phone]   [facility] [phone]   [facility] [phone]   [facility] [phone]   [facility] [phone]   [facility] [phone]   General acute hospital [phone]   [facility] [phone]

## 2025-04-28 NOTE — PLAN OF CARE
Problem: PAIN - ADULT  Goal: Verbalizes/displays adequate comfort level or baseline comfort level  Description: Interventions:- Encourage patient to monitor pain and request assistance- Assess pain using appropriate pain scale- Administer analgesics based on type and severity of pain and evaluate response- Implement non-pharmacological measures as appropriate and evaluate response- Consider cultural and social influences on pain and pain management- Notify physician/advanced practitioner if interventions unsuccessful or patient reports new pain  Outcome: Progressing     Problem: INFECTION - ADULT  Goal: Absence or prevention of progression during hospitalization  Description: INTERVENTIONS:- Assess and monitor for signs and symptoms of infection- Monitor lab/diagnostic results- Monitor all insertion sites, i.e. indwelling lines, tubes, and drains- Monitor endotracheal if appropriate and nasal secretions for changes in amount and color- [place] appropriate cooling/warming therapies per order- Administer medications as ordered- Instruct and encourage patient and family to use good hand hygiene technique- Identify and instruct in appropriate isolation precautions for identified infection/condition  Outcome: Progressing  Goal: Absence of fever/infection during neutropenic period  Description: INTERVENTIONS:- Monitor WBC  Outcome: Progressing     Problem: DISCHARGE PLANNING  Goal: Discharge to home or other facility with appropriate resources  Description: INTERVENTIONS:- Identify barriers to discharge w/patient and caregiver- Arrange for needed discharge resources and transportation as appropriate- Identify discharge learning needs (meds, wound care, etc.)- Arrange for interpretive services to assist at discharge as needed- Refer to Case Management Department for coordinating discharge planning if the patient needs post-hospital services based on physician/advanced practitioner order or complex needs related to  functional status, cognitive ability, or social support system  Outcome: Progressing

## 2025-04-28 NOTE — PROGRESS NOTES
Progress Note - Hospitalist   Name: Zhane García 60 y.o. female I MRN: [id number]  Unit/Bed#: ICU 02 I Date of Admission: 4/27/2025   Date of Service: 4/28/2025 I Hospital Day: 1    Assessment & Plan  Abdominal pain  Zhane García is a 60-year-old female who presents in the emergency department with a few days of hematochezia and abdominal pain.  The patient has a history of colitis, last colonoscopy in 2016 showed sigmoid diverticulosis, diffuse moderate scaring in the ascending colon.  A second colonoscopy in 2018 was obtained with sigmoid diverticulosis and no evidence of colitis  CT abdomen and pelvis on admission shows diffuse wall thickening throughout the descending and proximal sigmoid colon with loss of haustration, possible colitis  Blood work shows stable hemoglobin, the patient is hemodynamically stable, does not meet SIRS or sepsis criteria as of now  On assessment the patient is alert and oriented x 4, reports 5 days of strings of blood in the stool, those episodes are accompanied with purulent discharge, reports moderate/severe abdominal pain mostly in the left lower quadrant  General Surgery was consulted by ED provider, appreciate recommendations  Patient received a dose of Zosyn, continue with ceftriaxone and Flagyl on the floor  C dif fpending  IVF  Advance diet to clear liquids  Protonix twice daily  GI and general surgery consult in place  Colitis  History of ischemic colitis  CT abdomen and pelvis on admission shows diffuse wall thickening throughout the descending and proximal sigmoid colon with loss of postradiation, this is consistent with colitis  Refer to the above plan    Acute on chronic back pain  Reports chronic back pain, however, states pain has been exacerbated by GI events  Chart was reviewed and noted patient was following with physical therapy in 2024 due to vertigo , appears to be resolved  Etiology of acute on chronic back pain likely secondary to possible colitis  flare/abdominal pain  Multimodal pain management  PT for further evaluation and treatment    GERD (gastroesophageal reflux disease)  Protonix twice daily    VTE Pharmacologic Prophylaxis: VTE Score: 3 Low Risk (Score 0-2) - Encourage Ambulation.    Mobility:   Basic Mobility Inpatient Raw Score: 24  JH-HLM Goal: 8: Walk 250 feet or more  JH-HLM Achieved: 8: Walk 250 feet ot more  JH-HLM Goal achieved. Continue to encourage appropriate mobility.    Patient Centered Rounds: I performed bedside rounds with nursing staff today.   Discussions with Specialists or Other Care Team Provider:     Education and Discussions with Family / Patient: Updated  (significant other) at bedside.    Current Length of Stay: 1 day(s)  Current Patient Status: Inpatient   Certification Statement: The patient will continue to require additional inpatient hospital stay due to pending stool studies  Discharge Plan: Anticipate discharge in 24-48 hrs to discharge location to be determined pending rehab evaluations.    Code Status: Level 1 - Full Code    Subjective   Patient tolerating clear liquid diet, reports several episodes of diarrhea this am    Objective :  Temp:  [97.6 °F (36.4 °C)-98 °F (36.7 °C)] 98 °F (36.7 °C)  HR:  [] 76  BP: (113-158)/(57-70) 134/57  Resp:  [18] 18  SpO2:  [95 %-99 %] 99 %  O2 Device: None (Room air)    Body mass index is 42.69 kg/m².     Input and Output Summary (last 24 hours):     Intake/Output Summary (Last 24 hours) at 4/28/2025 1513  Last data filed at 4/28/2025 0800  Gross per 24 hour   Intake 3295.42 ml   Output --   Net 3295.42 ml       Physical Exam  Vitals and nursing note reviewed.   Constitutional:       General: She is not in acute distress.     Appearance: She is well-developed. She is not toxic-appearing or diaphoretic.   HENT:      Head: Normocephalic and atraumatic.   Eyes:      General: No scleral icterus.     Conjunctiva/sclera: Conjunctivae normal.   Cardiovascular:      Rate  and Rhythm: Normal rate and regular rhythm.      Heart sounds: No murmur heard.     No friction rub. No gallop.   Pulmonary:      Effort: Pulmonary effort is normal. No respiratory distress.      Breath sounds: Normal breath sounds. No stridor. No wheezing, rhonchi or rales.   Chest:      Chest wall: No tenderness.   Abdominal:      General: There is no distension.      Palpations: Abdomen is soft. There is no mass.      Tenderness: There is no abdominal tenderness. There is no guarding or rebound.      Hernia: No hernia is present.   Musculoskeletal:         General: No swelling or tenderness.      Cervical back: Neck supple.   Skin:     General: Skin is warm and dry.      Capillary Refill: Capillary refill takes less than 2 seconds.   Neurological:      Mental Status: She is alert and oriented to person, place, and time.   Psychiatric:         Mood and Affect: Mood normal.           Lines/Drains:              Lab Results: I have reviewed the following results:   Results from last 7 days   Lab Units 04/28/25  1152 04/28/25  0416   WBC Thousand/uL  --  8.20   HEMOGLOBIN g/dL 11.8 12.1   HEMATOCRIT % 36.4 37.0   PLATELETS Thousands/uL  --  246   SEGS PCT %  --  60   LYMPHO PCT %  --  28   MONO PCT %  --  10   EOS PCT %  --  2     Results from last 7 days   Lab Units 04/28/25  0416   SODIUM mmol/L 138   POTASSIUM mmol/L 3.8   CHLORIDE mmol/L 108   CO2 mmol/L 23   BUN mg/dL 13   CREATININE mg/dL 0.83   ANION GAP mmol/L 7   CALCIUM mg/dL 8.0*   ALBUMIN g/dL 3.7   TOTAL BILIRUBIN mg/dL 0.66   ALK PHOS U/L 55   ALT U/L 30   AST U/L 20   GLUCOSE RANDOM mg/dL 80                 Results from last 7 days   Lab Units 04/27/25  1835   LACTIC ACID mmol/L 0.9       Recent Cultures (last 7 days):   Results from last 7 days   Lab Units 04/28/25  0546   C DIFF TOXIN B BY PCR  Positive*       Imaging Results Review: No pertinent imaging studies reviewed.  Other Study Results Review: No additional pertinent studies reviewed.    Last  24 Hours Medication List:     Current Facility-Administered Medications:     acetaminophen (Ofirmev) injection 1,000 mg, Q6H PRN    aluminum-magnesium hydroxide-simethicone (MAALOX) oral suspension 30 mL, Q6H PRN    aspirin chewable tablet 81 mg, Daily    ceftriaxone (ROCEPHIN) 1 g/50 mL in dextrose IVPB, Q24H, Last Rate: Stopped (04/28/25 0545)    lidocaine (LIDODERM) 5 % patch 1 patch, Daily    metroNIDAZOLE (FLAGYL) IVPB (premix) 500 mg 100 mL, Q12H, Last Rate: Stopped (04/28/25 0602)    multi-electrolyte (Plasmalyte-A/Isolyte-S PH 7.4/Normosol-R) IV solution, Continuous, Last Rate: 125 mL/hr (04/28/25 1200)    ondansetron (ZOFRAN) injection 4 mg, Q6H PRN    pantoprazole (PROTONIX) injection 40 mg, Q12H CARLOS    polyethylene glycol (MIRALAX) packet 17 g, Daily PRN    vancomycin (VANCOCIN) capsule 125 mg, Q6H CARLOS    Administrative Statements   Today, Patient Was Seen By: Caden Byrne DO      **Please Note: This note may have been constructed using a voice recognition system.**

## 2025-04-28 NOTE — PROGRESS NOTES
Progress Note - Surgery-General   Name: Zhane García 60 y.o. female I MRN: [id number]  Unit/Bed#: ICU 02 I Date of Admission: 4/27/2025   Date of Service: 4/28/2025 I Hospital Day: 1    Assessment & Plan  Colitis  - Hematochezia and purulent discharge in setting of colitis. History of ischemic colitis in 2014. Colonoscopy in 2016 with marked sigmoid diverticulosis, diffuse moderate scarring in descending colon. 2018 colonoscopy with sigmoid diverticulosis and no evidence of colitis, though with poor colonic preparation. 2022 colonoscopy with descending colon-scarring noted.   - 4/27 CT AP with diffuse wall thickening throughout the descending and proximal sigmoid colon, with loss of haustration. Labs on admission: WBC 9.75, Hgb 14.1, Lactate 0.9. No recent illnesses.   - No indication for urgent surgery  - GI consult, appreciate recs  - Advance to CLD  - Continue IVF  - Recommend to continue IV antibiotics  - Outpatient follow up with colorectal surgery.  - Remainder of care per primary team  - General Surgery will follow along.  Abdominal pain  - See above    Surgery-General service will follow.    Subjective   No acute events overnight. Afebrile, hemodynamically stable. No nausea, or vomiting, fevers or chills. Still having frequent bowel movements. Had an episode of pain this morning which is beginning to improve.       Objective :  Temp:  [97.6 °F (36.4 °C)-97.8 °F (36.6 °C)] 97.8 °F (36.6 °C)  HR:  [] 76  BP: (113-158)/(61-70) 113/68  Resp:  [18] 18  SpO2:  [95 %-99 %] 99 %  O2 Device: None (Room air)    I/O         04/26 0701  04/27 0700 04/27 0701  04/28 0700 04/28 0701  04/29 0700    I.V. (mL/kg)  747.5 (6.6)     IV Piggyback  2250     Total Intake(mL/kg)  2997.5 (26.5)     Net  +2997.5            Unmeasured Urine Occurrence  2 x     Unmeasured Stool Occurrence  1 x             Physical Exam:  General: No acute distress, alert and oriented  CV: Well perfused, regular rate  Lungs: Normal work of  breathing, no increased respiratory effort  Abdomen: Soft, mild suprapubic tenderness, no rebound or guarding  Extremities: No edema, clubbing or cyanosis  Skin: Warm, dry      Lab Results: I have reviewed the following results:  Recent Labs     04/27/25  1835 04/28/25  0416   WBC 9.75 8.20   HGB 14.1 12.1   HCT 43.5 37.0    246   SODIUM 138 138   K 4.1 3.8    108   CO2 23 23   BUN 17 13   CREATININE 0.87 0.83   GLUC 85 80   MG  --  2.0   PHOS  --  3.3   AST 25 20   ALT 39 30   ALB 4.4 3.7   TBILI 0.65 0.66   ALKPHOS 69 55   LACTICACID 0.9  --        Imaging Results Review: No pertinent imaging studies reviewed.  Other Study Results Review: No additional pertinent studies reviewed.    VTE Pharmacologic Prophylaxis: VTE covered by:    None     VTE Mechanical Prophylaxis: sequential compression device

## 2025-04-28 NOTE — ASSESSMENT & PLAN NOTE
Zhane García is a 60-year-old female who presents in the emergency department with a few days of hematochezia and abdominal pain.  The patient has a history of colitis, last colonoscopy in 2016 showed sigmoid diverticulosis, diffuse moderate scaring in the ascending colon.  A second colonoscopy in 2018 was obtained with sigmoid diverticulosis and no evidence of colitis  CT abdomen and pelvis on admission shows diffuse wall thickening throughout the descending and proximal sigmoid colon with loss of haustration, possible colitis  Blood work shows stable hemoglobin, the patient is hemodynamically stable, does not meet SIRS or sepsis criteria as of now  On assessment the patient is alert and oriented x 4, reports 5 days of strings of blood in the stool, those episodes are accompanied with purulent discharge, reports moderate/severe abdominal pain mostly in the left lower quadrant  General Surgery was consulted by ED provider, appreciate recommendations  Patient received a dose of Zosyn, continue with ceftriaxone and Flagyl on the floor  IVF  N.p.o.  Protonix twice daily  GI and general consult in place

## 2025-04-28 NOTE — ASSESSMENT & PLAN NOTE
Patient with almost 2 weeks of abdominal pain followed by 5-7 episodes of nonbloody diarrhea, followed by 7-10 episodes of daily bloody bowel movements.  She did not present to the emergency room until several days later.  Hemodynamically stable.  CT with left-sided colitis.  Mild drop in hemoglobin.  Blood in the stool is improving today.  Suspected infectious/ischemic colitis    - Patient with improvement of symptoms.  Abdominal exam benign.  - Offered clear liquid diet which she reports she may hold off of until tomorrow    -Follow-up stool studies.  - Continue antibiotics, ceftriaxone and Flagyl.  - Monitor stool output.  - Antiemetics as needed.  -Monitor Hgb/WBC, can decrease frequency to daily    - If continuing to improve with no significant bloody output, lab work stable can likely hold off on inpatient procedure.  - Recommend outpatient colonoscopy in 6 to 8 weeks.

## 2025-04-28 NOTE — ASSESSMENT & PLAN NOTE
Zhane García is a 60-year-old female who presents in the emergency department with a few days of hematochezia and abdominal pain.  The patient has a history of colitis, last colonoscopy in 2016 showed sigmoid diverticulosis, diffuse moderate scaring in the ascending colon.  A second colonoscopy in 2018 was obtained with sigmoid diverticulosis and no evidence of colitis  CT abdomen and pelvis on admission shows diffuse wall thickening throughout the descending and proximal sigmoid colon with loss of haustration, possible colitis  Blood work shows stable hemoglobin, the patient is hemodynamically stable, does not meet SIRS or sepsis criteria as of now  On assessment the patient is alert and oriented x 4, reports 5 days of strings of blood in the stool, those episodes are accompanied with purulent discharge, reports moderate/severe abdominal pain mostly in the left lower quadrant  General Surgery was consulted by ED provider, appreciate recommendations  Patient received a dose of Zosyn, continue with ceftriaxone and Flagyl on the floor  C dif fpending  IVF  Advance diet to clear liquids  Protonix twice daily  GI and general surgery consult in place

## 2025-04-28 NOTE — ED NOTES
Unable to collect stool due to large amount of urine mixed with sample, pt willing to try again later.      Joseline Arias, DRISS  04/28/25 0028

## 2025-04-28 NOTE — H&P
H&P - Hospitalist   Name: Zhane García 60 y.o. female I MRN: [id number]  Unit/Bed#: ED-38 I Date of Admission: 4/27/2025   Date of Service: 4/27/2025 I Hospital Day: 0     Assessment & Plan  Abdominal pain  Zhane García is a 60-year-old female who presents in the emergency department with a few days of hematochezia and abdominal pain.  The patient has a history of colitis, last colonoscopy in 2016 showed sigmoid diverticulosis, diffuse moderate scaring in the ascending colon.  A second colonoscopy in 2018 was obtained with sigmoid diverticulosis and no evidence of colitis  CT abdomen and pelvis on admission shows diffuse wall thickening throughout the descending and proximal sigmoid colon with loss of haustration, possible colitis  Blood work shows stable hemoglobin, the patient is hemodynamically stable, does not meet SIRS or sepsis criteria as of now  On assessment the patient is alert and oriented x 4, reports 5 days of strings of blood in the stool, those episodes are accompanied with purulent discharge, reports moderate/severe abdominal pain mostly in the left lower quadrant  General Surgery was consulted by ED provider, appreciate recommendations  Patient received a dose of Zosyn, continue with ceftriaxone and Flagyl on the floor  IVF  N.p.o.  Protonix twice daily  GI and general consult in place  Colitis  History of ischemic colitis  CT abdomen and pelvis on admission shows diffuse wall thickening throughout the descending and proximal sigmoid colon with loss of postradiation, this is consistent with colitis  Refer to the above plan    Acute on chronic back pain  Reports chronic back pain, however, states pain has been exacerbated by GI events  Chart was reviewed and noted patient was following with physical therapy in 2024 due to vertigo , appears to be resolved  Etiology of acute on chronic back pain likely secondary to possible colitis flare/abdominal pain  Multimodal pain management  PT for  further evaluation and treatment    GERD (gastroesophageal reflux disease)  Protonix twice daily      VTE Pharmacologic Prophylaxis: VTE Score: 3 Moderate Risk (Score 3-4) - Pharmacological DVT Prophylaxis Ordered: enoxaparin (Lovenox).  Code Status: Level 1 - Full Code   Discussion with family: Patient declined call to .     Anticipated Length of Stay: Patient will be admitted on an inpatient basis with an anticipated length of stay of greater than 2 midnights secondary to abdominal pain and pending GI and general surgery consult.    History of Present Illness   Chief Complaint:   Chief Complaint   Patient presents with    Diarrhea     Pt reports diarrhea over a week, reports blood and other drainage in stool, denies n/v, abd/lower back pain         Zhane García is a 60 y.o. female with a PMH of colitis, diverticulitis, dizziness, psoriasis, who presents with abdominal pain.  Patient presents in the ER with an onset of abdominal pain that has been going on for 5 days.  The patient reports this pain is accompanied by bloody stools with some yellow  discharge.  Patient reports there is a strings of blood with her feces, she has not seen bright blood in toilet bowl or gross bleeding.  The patient reports pain is moderate to severe in the left lower quadrant.  CT abdomen and pelvis in the ER shows possible colitis, hemoglobin is stable.  The patient has been admitted as inpatient for further management and treatment.    Review of Systems   Constitutional:  Negative for chills and fever.   HENT:  Negative for ear pain and sore throat.    Eyes:  Negative for pain and visual disturbance.   Respiratory:  Negative for cough and shortness of breath.    Cardiovascular:  Negative for chest pain and palpitations.   Gastrointestinal:  Positive for abdominal pain, blood in stool, nausea and rectal pain. Negative for vomiting.   Genitourinary:  Negative for dysuria and hematuria.   Musculoskeletal:  Positive  "for back pain. Negative for arthralgias.   Skin:  Negative for color change and rash.   Neurological:  Negative for seizures and syncope.   All other systems reviewed and are negative.      Historical Information   Past Medical History:   Diagnosis Date    Colitis     Diverticulitis     Dizziness     HL (hearing loss)     Psoriasis     Stroke (HCC)     unknown origin of 3 \"mini strokes\"  2008, 2012, [facility]     Tinnitus      Past Surgical History:   Procedure Laterality Date    ADENOIDECTOMY      CHOLECYSTECTOMY      COLONOSCOPY      DILATION AND CURETTAGE OF UTERUS      [place] COLONOSCOPY FLX DX W/COLLJ SPEC WHEN PFRMD N/A 4/16/2018    Procedure: COLONOSCOPY;  Surgeon: Neagr Patton MD;  Location: [facility];  Service: Gastroenterology    TONSILLECTOMY      US GUIDED THYROID BIOPSY  10/24/2024     Social History     Tobacco Use    Smoking status: Former    Smokeless tobacco: Never   Vaping Use    Vaping status: Never Used   Substance and Sexual Activity    Alcohol use: No    Drug use: No    Sexual activity: Yes     Partners: Male     E-Cigarette/Vaping    E-Cigarette Use Never User      E-Cigarette/Vaping Substances     Family History   Problem Relation Age of Onset    Diabetes type II Mother     Heart failure Mother     Prostate cancer Father 67    Heart disease Father     No Known Problems Sister     No Known Problems Sister     Diabetes type II Brother     No Known Problems Maternal Aunt     Breast cancer Paternal Aunt 52    Colon cancer Paternal Aunt 80    Colon cancer Paternal Uncle         colon ca    No Known Problems Maternal Grandmother     No Known Problems Maternal Grandfather     No Known Problems Paternal Grandmother     No Known Problems Paternal Grandfather     No Known Problems Daughter     No Known Problems Daughter     BRCA1 Positive Cousin     BRCA1 Positive Cousin      Social History:  Marital Status: /Civil Union   Occupation: Works  Patient Pre-hospital Living " Situation: Home  Patient Pre-hospital Level of Mobility: walks  Patient Pre-hospital Diet Restrictions: None    Meds/Allergies   I have reviewed home medications with patient personally.  Reports she takes baby aspirin at home only and magnesium 500 mg.  Prior to Admission medications    Medication Sig Start Date End Date Taking? Authorizing Provider   ASPIRIN 81 PO Take 81 mg by mouth    Historical Provider, MD   Cholecalciferol 50 MCG (2000 UT) TABS Take 1 capsule by mouth Daily 10/30/23   Historical Provider, MD   diazepam (VALIUM) 5 mg tablet if needed 4/11/23   Historical Provider, MD   diphenhydrAMINE (Benadryl Allergy) 25 mg capsule take 1 capsule by oral route  at bedtime as needed 5/16/24   Historical Provider, MD   Magnesium 400 MG TABS Take by mouth    Historical Provider, MD   meclizine (ANTIVERT) 25 mg tablet Take 25 mg by mouth 3 (three) times a day as needed 8/25/22   Historical Provider, MD   mometasone (ELOCON) 0.1 % lotion Apply topically if needed 12/7/22   Historical Provider, MD   Wegovy 0.5 MG/0.5ML Inject 0.5 mg under the skin once a week    Historical Provider, MD     No Known Allergies    Objective :  Temp:  [97.8 °F (36.6 °C)] 97.8 °F (36.6 °C)  HR:  [] 84  BP: (141-158)/(66-70) 158/70  Resp:  [18] 18  SpO2:  [95 %-99 %] 99 %  O2 Device: None (Room air)    Physical Exam  Vitals and nursing note reviewed.   Constitutional:       General: She is not in acute distress.     Appearance: Normal appearance. She is well-developed. She is obese.   HENT:      Head: Normocephalic and atraumatic.      Mouth/Throat:      Mouth: Mucous membranes are moist.      Pharynx: Oropharynx is clear.   Eyes:      Conjunctiva/sclera: Conjunctivae normal.      Pupils: Pupils are equal, round, and reactive to light.   Cardiovascular:      Rate and Rhythm: Normal rate and regular rhythm.      Pulses: Normal pulses.      Heart sounds: Normal heart sounds. No murmur heard.  Pulmonary:      Effort: Pulmonary effort  is normal. No respiratory distress.      Breath sounds: Normal breath sounds.   Abdominal:      General: Bowel sounds are normal.      Palpations: Abdomen is soft.      Tenderness: There is no abdominal tenderness.   Musculoskeletal:         General: No swelling.      Cervical back: Neck supple.   Skin:     General: Skin is warm and dry.      Capillary Refill: Capillary refill takes less than 2 seconds.   Neurological:      Mental Status: She is alert. Mental status is at baseline.   Psychiatric:         Mood and Affect: Mood normal.          Lines/Drains:            Lab Results: I have reviewed the following results:  Results from last 7 days   Lab Units 04/27/25  1835   WBC Thousand/uL 9.75   HEMOGLOBIN g/dL 14.1   HEMATOCRIT % 43.5   PLATELETS Thousands/uL 312   SEGS PCT % 62   LYMPHO PCT % 27   MONO PCT % 10   EOS PCT % 1     Results from last 7 days   Lab Units 04/27/25  1835   SODIUM mmol/L 138   POTASSIUM mmol/L 4.1   CHLORIDE mmol/L 105   CO2 mmol/L 23   BUN mg/dL 17   CREATININE mg/dL 0.87   ANION GAP mmol/L 10   CALCIUM mg/dL 9.1   ALBUMIN g/dL 4.4   TOTAL BILIRUBIN mg/dL 0.65   ALK PHOS U/L 69   ALT U/L 39   AST U/L 25   GLUCOSE RANDOM mg/dL 85             Lab Results   Component Value Date    HGBA1C 5.8 (H) 02/25/2023     Results from last 7 days   Lab Units 04/27/25  1835   LACTIC ACID mmol/L 0.9       Imaging Results Review: I personally reviewed the following image studies/reports in PACS and discussed pertinent findings with Radiology: CT abdomen/pelvis. My interpretation of the radiology images/reports is: Possible colitis.  Other Study Results Review: No additional pertinent studies reviewed.    Administrative Statements   I have spent a total time of 75 minutes in caring for this patient on the day of the visit/encounter including Diagnostic results, Prognosis, Risks and benefits of tx options, Instructions for management, Patient and family education, Importance of tx compliance, Risk factor  reductions, Impressions, Counseling / Coordination of care, Documenting in the medical record, Reviewing/placing orders in the medical record (including tests, medications, and/or procedures), Obtaining or reviewing history  , and Communicating with other healthcare professionals .    ** Please Note: This note has been constructed using a voice recognition system. **

## 2025-04-28 NOTE — ASSESSMENT & PLAN NOTE
History of ischemic colitis  CT abdomen and pelvis on admission shows diffuse wall thickening throughout the descending and proximal sigmoid colon with loss of postradiation, this is consistent with colitis  Refer to the above plan

## 2025-04-28 NOTE — CONSULTS
Consultation - Surgery-General   Name: Zhane García 60 y.o. female I MRN: [id number]  Unit/Bed#: ED-38 I Date of Admission: 4/27/2025   Date of Service: 4/27/2025 I Hospital Day: 0   Consults  Physician Requesting Evaluation: Arsenio Alvarez MD   Reason for Evaluation / Principal Problem: hematochezia     Assessment & Plan  Colitis  - Hematochezia and purulent discharge in setting of colitis. History of ischemic colitis in 2014. Colonoscopy in 2016 with marked sigmoid diverticulosis, diffuse moderate scarring in descending colon. 2018 colonoscopy with sigmoid diverticulosis and no evidence of colitis, though with poor colonic preparation.   - 4/27 CT AP with diffuse wall thickening throughout the descending and proximal sigmoid colon, with loss of haustration. Labs on admission: WBC 9.75, Hgb 14.1, Lactate 0.9. No recent illnesses.   - Recommend medicine evaluation for admission  - GI consult  - NPO  - IVF  - Recommend antibiotics  - Outpatient follow up with colorectal surgery.  - Remainder of care per primary team  - General Surgery will follow along.  Hematochezia  - See above  Surgery-General service will follow.    History of Present Illness   Zhane García is a 60 y.o. female who presents with abdominal pain, hematochezia, and purulent rectal discharge. She says the pain has been occurring for the past week and more intense a few days ago but has since begun to improve. She denies nausea, vomiting, fevers, or chills. She does endorse intermittent hematochezia and purulent discharge. She states the symptoms feel similar to a prior episode of ischemic colitis in 2014 which was managed medically. She has self imposed a liquid diet when the symptoms began. She denies any recent illness or changes to her health prior to this episode. Colonoscopy in 2016 with marked sigmoid diverticulosis, diffuse moderate scarring in descending colon. 2018 colonoscopy with sigmoid diverticulosis and no evidence of  "colitis, though with poor colonic preparation. 4/27 CT AP with diffuse wall thickening throughout the descending and proximal sigmoid colon, with loss of haustration. Labs on admission: WBC 9.75, Hgb 14.1, Lactate 0.9. Was taking baby aspirin which was held when this episode began, no AC.     Review of Systems  Review of systems negative except as per HPI.     Medical History Review: I have reviewed the patient's PMH, PSH, Social History, Family History, Meds, and Allergies   Historical Information   Past Medical History:   Diagnosis Date    Colitis     Diverticulitis     Dizziness     HL (hearing loss)     Psoriasis     Stroke (HCC)     unknown origin of 3 \"mini strokes\"  2008, 2012, [facility]     Tinnitus      Past Surgical History:   Procedure Laterality Date    ADENOIDECTOMY      CHOLECYSTECTOMY      COLONOSCOPY      DILATION AND CURETTAGE OF UTERUS      CO COLONOSCOPY FLX DX W/COLLJ SPEC WHEN PFRMD N/A 4/16/2018    Procedure: COLONOSCOPY;  Surgeon: Negar Patton MD;  Location: [facility];  Service: Gastroenterology    TONSILLECTOMY      US GUIDED THYROID BIOPSY  10/24/2024     Social History     Tobacco Use    Smoking status: Former    Smokeless tobacco: Never   Vaping Use    Vaping status: Never Used   Substance and Sexual Activity    Alcohol use: No    Drug use: No    Sexual activity: Yes     Partners: Male     E-Cigarette/Vaping    E-Cigarette Use Never User      E-Cigarette/Vaping Substances     Family History   Problem Relation Age of Onset    Diabetes type II Mother     Heart failure Mother     Prostate cancer Father 67    Heart disease Father     No Known Problems Sister     No Known Problems Sister     Diabetes type II Brother     No Known Problems Maternal Aunt     Breast cancer Paternal Aunt 52    Colon cancer Paternal Aunt 80    Colon cancer Paternal Uncle         colon ca    No Known Problems Maternal Grandmother     No Known Problems Maternal Grandfather     No Known " Problems Paternal Grandmother     No Known Problems Paternal Grandfather     No Known Problems Daughter     No Known Problems Daughter     BRCA1 Positive Cousin     BRCA1 Positive Cousin      Social History     Tobacco Use    Smoking status: Former    Smokeless tobacco: Never   Vaping Use    Vaping status: Never Used   Substance and Sexual Activity    Alcohol use: No    Drug use: No    Sexual activity: Yes     Partners: Male       Current Facility-Administered Medications:     acetaminophen (Ofirmev) injection 1,000 mg, Q6H PRN    aluminum-magnesium hydroxide-simethicone (MAALOX) oral suspension 30 mL, Q6H PRN    [START ON 4/28/2025] aspirin chewable tablet 81 mg, Daily    [START ON 4/28/2025] ceftriaxone (ROCEPHIN) 1 g/50 mL in dextrose IVPB, Q24H    lidocaine (LIDODERM) 5 % patch 1 patch, Daily    [START ON 4/28/2025] metroNIDAZOLE (FLAGYL) IVPB (premix) 500 mg 100 mL, Q12H    multi-electrolyte (Plasmalyte-A/Isolyte-S PH 7.4/Normosol-R) IV solution, Continuous    ondansetron (ZOFRAN) injection 4 mg, Q6H PRN    pantoprazole (PROTONIX) injection 40 mg, Q12H CARLOS    polyethylene glycol (MIRALAX) packet 17 g, Daily PRN    sodium chloride 0.9 % bolus 1,000 mL, Once, Last Rate: 1,000 mL (04/27/25 2140)  Prior to Admission Medications   Prescriptions Last Dose Informant Patient Reported? Taking?   ASPIRIN 81 PO 4/27/2025 Self Yes Yes   Sig: Take 81 mg by mouth   Cholecalciferol 50 MCG (2000 UT) TABS Not Taking Self Yes No   Sig: Take 1 capsule by mouth Daily   Patient not taking: Reported on 4/27/2025   Magnesium 400 MG TABS Past Week Self Yes Yes   Sig: Take by mouth   Wegovy 0.5 MG/0.5ML 4/26/2025 Self Yes Yes   Sig: Inject 0.5 mg under the skin once a week   diazepam (VALIUM) 5 mg tablet Not Taking Self Yes No   Sig: if needed   Patient not taking: Reported on 4/27/2025   diphenhydrAMINE (Benadryl Allergy) 25 mg capsule More than a month Self Yes No   Sig: take 1 capsule by oral route  at bedtime as needed   Patient  not taking: Reported on 4/27/2025   meclizine (ANTIVERT) 25 mg tablet 4/26/2025 Self Yes Yes   Sig: Take 25 mg by mouth 3 (three) times a day as needed   mometasone (ELOCON) 0.1 % lotion 4/26/2025 Self Yes Yes   Sig: Apply topically if needed      Facility-Administered Medications: None     Patient has no known allergies.    Objective :  Temp:  [97.8 °F (36.6 °C)] 97.8 °F (36.6 °C)  HR:  [102] 102  BP: (141)/(66) 141/66  Resp:  [18] 18  SpO2:  [95 %] 95 %  O2 Device: None (Room air)        Physical Exam:  General: No acute distress, alert and oriented  CV: Well perfused, tachycardia  Lungs: Normal work of breathing, no increased respiratory effort  Abdomen: Soft, tender in suprapubic region and LLQ, no rebound or guarding.  Extremities: No edema, clubbing or cyanosis  Skin: Warm, dry      Lab Results: I have reviewed the following results:  Recent Labs     04/27/25  1835   WBC 9.75   HGB 14.1   HCT 43.5      SODIUM 138   K 4.1      CO2 23   BUN 17   CREATININE 0.87   GLUC 85   AST 25   ALT 39   ALB 4.4   TBILI 0.65   ALKPHOS 69   LACTICACID 0.9       Imaging Results Review: I personally reviewed the following image studies in PACS and associated radiology reports: CT abdomen/pelvis. My interpretation of the radiology images/reports is: Colitis, severe diverticulosis.  Other Study Results Review: No additional pertinent studies reviewed.    VTE Pharmacologic Prophylaxis: VTE covered by:    None     VTE Mechanical Prophylaxis: sequential compression device    Bautista Buchanan MD  General Surgery   04/27/25

## 2025-04-28 NOTE — CONSULTS
Consultation - Gastroenterology   Name: Zhane García 60 y.o. female I MRN: [id number]  Unit/Bed#: ICU 02 I Date of Admission: 4/27/2025   Date of Service: 4/28/2025 I Hospital Day: 1   Inpatient consult to gastroenterology  Consult performed by: Dahlia Mahajan PA-C  Consult ordered by: EBEN Clark        Physician Requesting Evaluation: Caden Byrne DO   Reason for Evaluation / Principal Problem: bloody stools      60-year-old female with history of ischemic colitis in 2018 who presents to the emergency room 4/27 for abdominal pain and bloody diarrhea x 5, CT showing diffuse wall thickening throughout the descending colon.  Hemodynamically stable with labs relatively unremarkable.  Assessment & Plan  Hematochezia  Patient with almost 2 weeks of abdominal pain followed by 5-7 episodes of nonbloody diarrhea, followed by 7-10 episodes of daily bloody bowel movements.  She did not present to the emergency room until several days later.  Hemodynamically stable.  CT with left-sided colitis.  Mild drop in hemoglobin.  Blood in the stool is improving today.  Suspected infectious/ischemic colitis    - Patient with improvement of symptoms.  Abdominal exam benign.  - Offered clear liquid diet which she reports she may hold off of until tomorrow    -Follow-up stool studies.  - Continue antibiotics, ceftriaxone and Flagyl.  - Monitor stool output.  - Antiemetics as needed.  -Monitor Hgb/WBC, can decrease frequency to daily    - If continuing to improve with no significant bloody output, lab work stable can likely hold off on inpatient procedure.  - Recommend outpatient colonoscopy in 6 to 8 weeks.    Abdominal pain  See above  Colitis  See above    Gastroenterology service will follow.    History of Present Illness   HPI:  Zhane García is a 60 y.o. female who presented to the emergency room yesterday 4/27 for abdominal pain and bloody diarrhea x 5 days.  CT scan was performed showing diffuse wall  thickening throughout the descending and proximal sigmoid colon, consistent with nonspecific colitis.  Lab work on arrival with unremarkable CMP and CBC with hemoglobin of 14 downtrending to 12 today.  Hemodynamically stable.  General surgery was also consulted.  She was placed on IV ceftriaxone and Flagyl.  She had history of ischemic colitis in 2018.    Patient reports almost 2 weeks ago started with abdominal cramping.  She then ate ham on Easter that she reports did not sit well with her.  She had abdominal cramping with this.  She ate the ham a few days later and reports symptoms started then with abdominal cramping and diarrhea.  She reports diarrhea was initially nonbloody for a few days having 5-7 bowel movements a day.  She reports she then began having bloody stools at least 10 times daily.  She reached out to GI office and PCP however ultimately went to the emergency room several days later.  She denies any recent antibiotics, travel or sick contacts.    She reports bowel movement this morning with improvement of blood only with small specks of red.    She has no chronic symptoms.    No prior abdominal surgeries.    Patient had a few mesenteric Dopplers in the past.  1 showing possible stenosis.  She had 2 following this which were unremarkable.  She followed up with vascular surgery 5/2019.  Symptoms at that time were thought to be unrelated to mesenteric ischemia or insufficiency.    Last colonoscopy 10/2022 with scarring noted in the descending colon, 1 cm mucosal nausea earlier lesion in the proximal ascending colon biopsies consistent with lipoma.  Repeat colonoscopy recommended in 5 years.      Review of Systems   All other systems reviewed and are negative.    Medical History Review: I have reviewed the patient's PMH, PSH, Social History, Family History, Meds, and Allergies     Objective :  Temp:  [97.6 °F (36.4 °C)-97.8 °F (36.6 °C)] 97.8 °F (36.6 °C)  HR:  [] 76  BP: (113-158)/(61-70)  113/68  Resp:  [18] 18  SpO2:  [95 %-99 %] 99 %  O2 Device: None (Room air)    Physical Exam  Vitals reviewed.   Constitutional:       General: She is not in acute distress.     Appearance: Normal appearance. She is not ill-appearing.      Comments: Lying comfortably in bed.  No distress.   HENT:      Head: Normocephalic and atraumatic.   Eyes:      Conjunctiva/sclera: Conjunctivae normal.   Cardiovascular:      Rate and Rhythm: Normal rate and regular rhythm.      Heart sounds: No murmur heard.  Pulmonary:      Effort: Pulmonary effort is normal. No respiratory distress.      Breath sounds: Normal breath sounds.   Abdominal:      General: Abdomen is flat. Bowel sounds are normal. There is no distension.      Palpations: Abdomen is soft.      Tenderness: There is abdominal tenderness (left sided). There is no guarding or rebound.   Musculoskeletal:         General: No swelling.      Cervical back: Normal range of motion.      Right lower leg: No edema.      Left lower leg: No edema.   Skin:     General: Skin is warm.      Coloration: Skin is not jaundiced.   Neurological:      General: No focal deficit present.      Mental Status: She is alert and oriented to person, place, and time. Mental status is at baseline.   Psychiatric:         Mood and Affect: Mood normal.         Behavior: Behavior normal.         Lab Results: I have reviewed the following results:

## 2025-04-28 NOTE — ASSESSMENT & PLAN NOTE
- Hematochezia and purulent discharge in setting of colitis. History of ischemic colitis in 2014. Colonoscopy in 2016 with marked sigmoid diverticulosis, diffuse moderate scarring in descending colon. 2018 colonoscopy with sigmoid diverticulosis and no evidence of colitis, though with poor colonic preparation.   - 4/27 CT AP with diffuse wall thickening throughout the descending and proximal sigmoid colon, with loss of haustration. Labs on admission: WBC 9.75, Hgb 14.1, Lactate 0.9. No recent illnesses.   - Recommend medicine evaluation for admission  - GI consult  - NPO  - IVF  - Recommend antibiotics  - Outpatient follow up with colorectal surgery.  - Remainder of care per primary team  - General Surgery will follow along.

## 2025-04-28 NOTE — ASSESSMENT & PLAN NOTE
Reports chronic back pain, however, states pain has been exacerbated by GI events  Chart was reviewed and noted patient was following with physical therapy in 2024 due to vertigo , appears to be resolved  Etiology of acute on chronic back pain likely secondary to possible colitis flare/abdominal pain  Multimodal pain management  PT for further evaluation and treatment

## 2025-04-28 NOTE — ASSESSMENT & PLAN NOTE
- Hematochezia and purulent discharge in setting of colitis. History of ischemic colitis in 2014. Colonoscopy in 2016 with marked sigmoid diverticulosis, diffuse moderate scarring in descending colon. 2018 colonoscopy with sigmoid diverticulosis and no evidence of colitis, though with poor colonic preparation. 2022 colonoscopy with descending colon-scarring noted.   - 4/27 CT AP with diffuse wall thickening throughout the descending and proximal sigmoid colon, with loss of haustration. Labs on admission: WBC 9.75, Hgb 14.1, Lactate 0.9. No recent illnesses.   - No indication for urgent surgery  - GI consult, appreciate recs  - Advance to [facility]  - Continue IVF  - Recommend to continue IV antibiotics  - Outpatient follow up with colorectal surgery.  - Remainder of care per primary team  - General Surgery will follow along.

## 2025-04-28 NOTE — QUICK NOTE
Inpatient consult to Acute Care Surgery  Consult performed by: Dahlia Toney PA-C  Consult ordered by: EBEN Clark      Please see consultation note written by Bautista Buchanan MD on 4/27/25    Dahlia Toney

## 2025-04-29 PROBLEM — A04.72 C. DIFFICILE COLITIS: Status: ACTIVE | Noted: 2018-02-27

## 2025-04-29 LAB
ALBUMIN SERPL BCG-MCNC: 3.6 G/DL (ref 3.5–5)
ALP SERPL-CCNC: 52 U/L (ref 34–104)
ALT SERPL W P-5'-P-CCNC: 23 U/L (ref 7–52)
ANION GAP SERPL CALCULATED.3IONS-SCNC: 5 MMOL/L (ref 4–13)
AST SERPL W P-5'-P-CCNC: 18 U/L (ref 13–39)
BASOPHILS # BLD AUTO: 0.03 THOUSANDS/ÂΜL (ref 0–0.1)
BASOPHILS NFR BLD AUTO: 1 % (ref 0–1)
BILIRUB SERPL-MCNC: 0.41 MG/DL (ref 0.2–1)
BUN SERPL-MCNC: 6 MG/DL (ref 5–25)
CALCIUM SERPL-MCNC: 8.4 MG/DL (ref 8.4–10.2)
CHLORIDE SERPL-SCNC: 109 MMOL/L (ref 96–108)
CO2 SERPL-SCNC: 27 MMOL/L (ref 21–32)
CREAT SERPL-MCNC: 0.73 MG/DL (ref 0.6–1.3)
EOSINOPHIL # BLD AUTO: 0.19 THOUSAND/ÂΜL (ref 0–0.61)
EOSINOPHIL NFR BLD AUTO: 3 % (ref 0–6)
ERYTHROCYTE [DISTWIDTH] IN BLOOD BY AUTOMATED COUNT: 14.1 % (ref 11.6–15.1)
GFR SERPL CREATININE-BSD FRML MDRD: 90 ML/MIN/1.73SQ M
GLUCOSE SERPL-MCNC: 84 MG/DL (ref 65–140)
HCT VFR BLD AUTO: 36.3 % (ref 34.8–46.1)
HGB BLD-MCNC: 12.2 G/DL (ref 11.5–15.4)
IMM GRANULOCYTES # BLD AUTO: 0.02 THOUSAND/UL (ref 0–0.2)
IMM GRANULOCYTES NFR BLD AUTO: 0 % (ref 0–2)
LYMPHOCYTES # BLD AUTO: 1.64 THOUSANDS/ÂΜL (ref 0.6–4.47)
LYMPHOCYTES NFR BLD AUTO: 25 % (ref 14–44)
MCH RBC QN AUTO: 31.3 PG (ref 26.8–34.3)
MCHC RBC AUTO-ENTMCNC: 33.6 G/DL (ref 31.4–37.4)
MCV RBC AUTO: 93 FL (ref 82–98)
MONOCYTES # BLD AUTO: 0.73 THOUSAND/ÂΜL (ref 0.17–1.22)
MONOCYTES NFR BLD AUTO: 11 % (ref 4–12)
NEUTROPHILS # BLD AUTO: 3.98 THOUSANDS/ÂΜL (ref 1.85–7.62)
NEUTS SEG NFR BLD AUTO: 60 % (ref 43–75)
NRBC BLD AUTO-RTO: 0 /100 WBCS
PLATELET # BLD AUTO: 253 THOUSANDS/UL (ref 149–390)
PMV BLD AUTO: 9.1 FL (ref 8.9–12.7)
POTASSIUM SERPL-SCNC: 3.9 MMOL/L (ref 3.5–5.3)
PROT SERPL-MCNC: 6 G/DL (ref 6.4–8.4)
RBC # BLD AUTO: 3.9 MILLION/UL (ref 3.81–5.12)
SODIUM SERPL-SCNC: 141 MMOL/L (ref 135–147)
WBC # BLD AUTO: 6.59 THOUSAND/UL (ref 4.31–10.16)

## 2025-04-29 PROCEDURE — 99232 SBSQ HOSP IP/OBS MODERATE 35: CPT | Performed by: SURGERY

## 2025-04-29 PROCEDURE — 80053 COMPREHEN METABOLIC PANEL: CPT | Performed by: PHYSICIAN ASSISTANT

## 2025-04-29 PROCEDURE — 99232 SBSQ HOSP IP/OBS MODERATE 35: CPT | Performed by: INTERNAL MEDICINE

## 2025-04-29 PROCEDURE — 85025 COMPLETE CBC W/AUTO DIFF WBC: CPT | Performed by: PHYSICIAN ASSISTANT

## 2025-04-29 RX ADMIN — VANCOMYCIN HYDROCHLORIDE 125 MG: 125 CAPSULE ORAL at 12:16

## 2025-04-29 RX ADMIN — VANCOMYCIN HYDROCHLORIDE 125 MG: 125 CAPSULE ORAL at 05:15

## 2025-04-29 RX ADMIN — ASPIRIN 81 MG: 81 TABLET, CHEWABLE ORAL at 08:27

## 2025-04-29 RX ADMIN — LIDOCAINE 1 PATCH: 50 PATCH CUTANEOUS at 08:27

## 2025-04-29 RX ADMIN — VANCOMYCIN HYDROCHLORIDE 125 MG: 125 CAPSULE ORAL at 17:12

## 2025-04-29 RX ADMIN — PANTOPRAZOLE SODIUM 40 MG: 40 INJECTION, POWDER, FOR SOLUTION INTRAVENOUS at 21:03

## 2025-04-29 RX ADMIN — PANTOPRAZOLE SODIUM 40 MG: 40 INJECTION, POWDER, FOR SOLUTION INTRAVENOUS at 08:27

## 2025-04-29 NOTE — ASSESSMENT & PLAN NOTE
Reports chronic back pain, however, states pain has been exacerbated by GI events  Chart was reviewed and noted patient was following with physical therapy in 2024 due to vertigo , appears to be resolved  Etiology of acute on chronic back pain likely secondary to possible colitis flare/abdominal pain  Multimodal pain management

## 2025-04-29 NOTE — PROGRESS NOTES
Progress Note - Gastroenterology   Name: Zhane García 60 y.o. female I MRN: [id number]  Unit/Bed#: W -01 I Date of Admission: 4/27/2025   Date of Service: 4/29/2025 I Hospital Day: 2      60-year-old female with history of ischemic colitis in 2018 who presents to the emergency room 4/27 for abdominal pain and bloody diarrhea x 5, CT showing diffuse wall thickening throughout the descending colon. C dif +/-.  Assessment & Plan  Hematochezia  Patient initially with 5-7 nonbloody bowel movements followed by at least 7-10 bloody bowel movements daily for 5 days prior to arrival.  CT with left-sided colitis.  C. difficile testing came back positive PCR negative toxin.  Suspect infectious/ischemic colitis.  No leukocytosis.  Hemodynamically stable.    -In the setting of colitis, severe diarrhea recommend treatment of C. difficile.  -Recommend vancomycin 125 mg 4 times daily x 10 days  - Will advance to low fiber/low residue/nondairy diet  - Fluids per primary team.  - Monitor for any fevers or leukocytosis.    -Due to improvement of symptoms, we will hold off on any inpatient endoscopic evaluation.  Will plan for repeat colonoscopy in 6 to 8 weeks.  Patient is aware.  - Discussed good hand hygiene at home, washing hands with soap and water, cleaning surfaces with bleach based products.    Abdominal pain  See above  Colitis  See above        Subjective   Patient reports feeling better today.  No bowel movements overnight.  Last bowel movement with only small specks of red blood.  She was able to tolerate some clear liquids.  She does still have some occasional left-sided abdominal burning.    Objective :  Temp:  [97.6 °F (36.4 °C)-98.1 °F (36.7 °C)] 98.1 °F (36.7 °C)  HR:  [69-91] 77  BP: (134-142)/(57-88) 141/67  Resp:  [15-18] 17  SpO2:  [92 %-97 %] 95 %  O2 Device: None (Room air)    Physical Exam  Vitals reviewed.   Constitutional:       General: She is not in acute distress.     Appearance: Normal  appearance. She is not ill-appearing.      Comments: Sitting comfortable in chair   HENT:      Head: Normocephalic and atraumatic.   Eyes:      Conjunctiva/sclera: Conjunctivae normal.   Cardiovascular:      Rate and Rhythm: Normal rate and regular rhythm.      Heart sounds: No murmur heard.  Pulmonary:      Effort: Pulmonary effort is normal. No respiratory distress.      Breath sounds: Normal breath sounds.   Abdominal:      General: Abdomen is flat. There is no distension.      Palpations: Abdomen is soft.      Tenderness: There is abdominal tenderness (mild left sided discomfort). There is no guarding or rebound.   Musculoskeletal:         General: No swelling.      Cervical back: Normal range of motion.      Right lower leg: No edema.      Left lower leg: No edema.   Skin:     General: Skin is warm.      Coloration: Skin is not jaundiced.   Neurological:      General: No focal deficit present.      Mental Status: She is alert and oriented to person, place, and time. Mental status is at baseline.   Psychiatric:         Mood and Affect: Mood normal.         Behavior: Behavior normal.         Lab Results: I have reviewed the following results:

## 2025-04-29 NOTE — PLAN OF CARE
Problem: PAIN - ADULT  Goal: Verbalizes/displays adequate comfort level or baseline comfort level  Description: Interventions:- Encourage patient to monitor pain and request assistance- Assess pain using appropriate pain scale- Administer analgesics based on type and severity of pain and evaluate response- Implement non-pharmacological measures as appropriate and evaluate response- Consider cultural and social influences on pain and pain management- Notify physician/advanced practitioner if interventions unsuccessful or patient reports new pain  Outcome: Progressing     Problem: INFECTION - ADULT  Goal: Absence or prevention of progression during hospitalization  Description: INTERVENTIONS:- Assess and monitor for signs and symptoms of infection- Monitor lab/diagnostic results- Monitor all insertion sites, i.e. indwelling lines, tubes, and drains- Monitor endotracheal if appropriate and nasal secretions for changes in amount and color- [place] appropriate cooling/warming therapies per order- Administer medications as ordered- Instruct and encourage patient and family to use good hand hygiene technique- Identify and instruct in appropriate isolation precautions for identified infection/condition  Outcome: Progressing  Goal: Absence of fever/infection during neutropenic period  Description: INTERVENTIONS:- Monitor WBC  Outcome: Progressing     Problem: DISCHARGE PLANNING  Goal: Discharge to home or other facility with appropriate resources  Description: INTERVENTIONS:- Identify barriers to discharge w/patient and caregiver- Arrange for needed discharge resources and transportation as appropriate- Identify discharge learning needs (meds, wound care, etc.)- Arrange for interpretive services to assist at discharge as needed- Refer to Case Management Department for coordinating discharge planning if the patient needs post-hospital services based on physician/advanced practitioner order or complex needs related to  functional status, cognitive ability, or social support system  Outcome: Progressing     Problem: Knowledge Deficit  Goal: Patient/family/caregiver demonstrates understanding of disease process, treatment plan, medications, and discharge instructions  Description: Complete learning assessment and assess knowledge base.Interventions:- Provide teaching at level of understanding- Provide teaching via preferred learning methods  Outcome: Progressing     Problem: Nutrition/Hydration-ADULT  Goal: Nutrient/Hydration intake appropriate for improving, restoring or maintaining nutritional needs  Description: Monitor and assess patient's nutrition/hydration status for malnutrition. Collaborate with interdisciplinary team and initiate plan and interventions as ordered.  Monitor patient's weight and dietary intake as ordered or per policy. Utilize nutrition screening tool and intervene as necessary. Determine patient's food preferences and provide high-protein, high-caloric foods as appropriate. INTERVENTIONS:- Monitor oral intake, urinary output, labs, and treatment plans- Assess nutrition and hydration status and recommend course of action- Evaluate amount of meals eaten- Assist patient with eating if necessary - Allow adequate time for meals- Recommend/ encourage appropriate diets, oral nutritional supplements, and vitamin/mineral supplements- Order, calculate, and assess calorie counts as needed- Recommend, monitor, and adjust tube feedings and TPN/PPN based on assessed needs- Assess need for intravenous fluids- Provide specific nutrition/hydration education as appropriate- Include patient/family/caregiver in decisions related to nutrition  Outcome: Progressing

## 2025-04-29 NOTE — PROGRESS NOTES
Progress Note - Hospitalist   Name: Zhane García 60 y.o. female I MRN: [id number]  Unit/Bed#: W -01 I Date of Admission: 4/27/2025   Date of Service: 4/29/2025 I Hospital Day: 2    Assessment & Plan  C. difficile colitis  History of ischemic colitis  CT abdomen and pelvis on admission shows diffuse wall thickening throughout the descending and proximal sigmoid colon with loss of postradiation, this is consistent with colitis  C. difficile toxin positive by PCR but EIA negative.  But given her symptoms patient was started on oral vancomycin.  Reports significant improvement in her symptoms.  Almost resolved.  No diarrhea since yesterday evening.  Tolerating diet well.  GI following.    Acute on chronic back pain  Reports chronic back pain, however, states pain has been exacerbated by GI events  Chart was reviewed and noted patient was following with physical therapy in 2024 due to vertigo , appears to be resolved  Etiology of acute on chronic back pain likely secondary to possible colitis flare/abdominal pain  Multimodal pain management    GERD (gastroesophageal reflux disease)  Protonix twice daily    VTE Pharmacologic Prophylaxis: VTE Score: 3 High Risk (Score >/= 5) - Pharmacological DVT Prophylaxis Contraindicated. Sequential Compression Devices Ordered.    Mobility:   Basic Mobility Inpatient Raw Score: 24  JH-HLM Goal: 8: Walk 250 feet or more  JH-HLM Achieved: 8: Walk 250 feet ot more  JH-HLM Goal achieved. Continue to encourage appropriate mobility.    Patient Centered Rounds: I performed bedside rounds with nursing staff today.   Discussions with Specialists or Other Care Team Provider: GI    Education and Discussions with Family / Patient: Patient declined call to .     Current Length of Stay: 2 day(s)  Current Patient Status: Inpatient   Certification Statement: The patient will continue to require additional inpatient hospital stay due to above  Discharge Plan: Anticipate  discharge tomorrow to home.    Code Status: Level 1 - Full Code    Subjective   Pt seen and examined by me in morning.  Says she is feeling much better.  No diarrhea since yesterday evening.  Still reports mild abdominal discomfort.  Tolerating diet well.    Objective :  Temp:  [97.6 °F (36.4 °C)-98.1 °F (36.7 °C)] 98.1 °F (36.7 °C)  HR:  [69-91] 77  BP: (134-142)/(67-88) 141/67  Resp:  [15-17] 17  SpO2:  [92 %-97 %] 95 %  O2 Device: None (Room air)    Body mass index is 42.69 kg/m².     Input and Output Summary (last 24 hours):     Intake/Output Summary (Last 24 hours) at 4/29/2025 1412  Last data filed at 4/29/2025 1039  Gross per 24 hour   Intake 2677.5 ml   Output 0 ml   Net 2677.5 ml       Physical Exam    Constitutional: Pt appears comfortable. Not in any acute distress.  HENT:   Head: Normocephalic and atraumatic.   Eyes: EOM are normal.   Neck: Neck supple.   Cardiovascular: Normal rate, regular rhythm, normal heart sounds.  No murmur heard.  Pulmonary/Chest: Effort normal, air entry b/l equal. No respiratory distress. Pt has no wheezes or crackles.   Abdominal: Soft. Non-distended, Non-tender. Bowel sounds are normal.   Neurological: awake, alert. Moving all extremities spontaneously.  Psychiatric: normal mood and affect.      Lines/Drains:              Lab Results: I have reviewed the following results:   Results from last 7 days   Lab Units 04/29/25  0607   WBC Thousand/uL 6.59   HEMOGLOBIN g/dL 12.2   HEMATOCRIT % 36.3   PLATELETS Thousands/uL 253   SEGS PCT % 60   LYMPHO PCT % 25   MONO PCT % 11   EOS PCT % 3     Results from last 7 days   Lab Units 04/29/25  0607   SODIUM mmol/L 141   POTASSIUM mmol/L 3.9   CHLORIDE mmol/L 109*   CO2 mmol/L 27   BUN mg/dL 6   CREATININE mg/dL 0.73   ANION GAP mmol/L 5   CALCIUM mg/dL 8.4   ALBUMIN g/dL 3.6   TOTAL BILIRUBIN mg/dL 0.41   ALK PHOS U/L 52   ALT U/L 23   AST U/L 18   GLUCOSE RANDOM mg/dL 84                 Results from last 7 days   Lab Units  04/27/25  1835   LACTIC ACID mmol/L 0.9       Recent Cultures (last 7 days):   Results from last 7 days   Lab Units 04/28/25  0546   C DIFF TOXIN B BY PCR  Positive*             Last 24 Hours Medication List:     Current Facility-Administered Medications:     acetaminophen (Ofirmev) injection 1,000 mg, Q6H PRN    aluminum-magnesium hydroxide-simethicone (MAALOX) oral suspension 30 mL, Q6H PRN    aspirin chewable tablet 81 mg, Daily    lidocaine (LIDODERM) 5 % patch 1 patch, Daily    ondansetron (ZOFRAN) injection 4 mg, Q6H PRN    pantoprazole (PROTONIX) injection 40 mg, Q12H CARLOS    polyethylene glycol (MIRALAX) packet 17 g, Daily PRN    vancomycin (VANCOCIN) capsule 125 mg, Q6H CARLOS    Administrative Statements   Today, Patient Was Seen By: Sandor Story MD      **Please Note: This note may have been constructed using a voice recognition system.**

## 2025-04-29 NOTE — ASSESSMENT & PLAN NOTE
History of ischemic colitis  CT abdomen and pelvis on admission shows diffuse wall thickening throughout the descending and proximal sigmoid colon with loss of postradiation, this is consistent with colitis  C. difficile toxin positive by PCR but EIA negative.  But given her symptoms patient was started on oral vancomycin.  Reports significant improvement in her symptoms.  Almost resolved.  No diarrhea since yesterday evening.  Tolerating diet well.  GI following.

## 2025-04-29 NOTE — PLAN OF CARE
Problem: PAIN - ADULT  Goal: Verbalizes/displays adequate comfort level or baseline comfort level  Description: Interventions:- Encourage patient to monitor pain and request assistance- Assess pain using appropriate pain scale- Administer analgesics based on type and severity of pain and evaluate response- Implement non-pharmacological measures as appropriate and evaluate response- Consider cultural and social influences on pain and pain management- Notify physician/advanced practitioner if interventions unsuccessful or patient reports new pain  Outcome: Progressing     Problem: INFECTION - ADULT  Goal: Absence or prevention of progression during hospitalization  Description: INTERVENTIONS:- Assess and monitor for signs and symptoms of infection- Monitor lab/diagnostic results- Monitor all insertion sites, i.e. indwelling lines, tubes, and drains- Monitor endotracheal if appropriate and nasal secretions for changes in amount and color- [place] appropriate cooling/warming therapies per order- Administer medications as ordered- Instruct and encourage patient and family to use good hand hygiene technique- Identify and instruct in appropriate isolation precautions for identified infection/condition  Outcome: Progressing  Goal: Absence of fever/infection during neutropenic period  Description: INTERVENTIONS:- Monitor WBC  Outcome: Progressing     Problem: DISCHARGE PLANNING  Goal: Discharge to home or other facility with appropriate resources  Description: INTERVENTIONS:- Identify barriers to discharge w/patient and caregiver- Arrange for needed discharge resources and transportation as appropriate- Identify discharge learning needs (meds, wound care, etc.)- Arrange for interpretive services to assist at discharge as needed- Refer to Case Management Department for coordinating discharge planning if the patient needs post-hospital services based on physician/advanced practitioner order or complex needs related to  functional status, cognitive ability, or social support system  Outcome: Progressing     Problem: Knowledge Deficit  Goal: Patient/family/caregiver demonstrates understanding of disease process, treatment plan, medications, and discharge instructions  Description: Complete learning assessment and assess knowledge base.Interventions:- Provide teaching at level of understanding- Provide teaching via preferred learning methods  Outcome: Progressing     Problem: Nutrition/Hydration-ADULT  Goal: Nutrient/Hydration intake appropriate for improving, restoring or maintaining nutritional needs  Description: Monitor and assess patient's nutrition/hydration status for malnutrition. Collaborate with interdisciplinary team and initiate plan and interventions as ordered.  Monitor patient's weight and dietary intake as ordered or per policy. Utilize nutrition screening tool and intervene as necessary. Determine patient's food preferences and provide high-protein, high-caloric foods as appropriate. INTERVENTIONS:- Monitor oral intake, urinary output, labs, and treatment plans- Assess nutrition and hydration status and recommend course of action- Evaluate amount of meals eaten- Assist patient with eating if necessary - Allow adequate time for meals- Recommend/ encourage appropriate diets, oral nutritional supplements, and vitamin/mineral supplements- Order, calculate, and assess calorie counts as needed- Recommend, monitor, and adjust tube feedings and TPN/PPN based on assessed needs- Assess need for intravenous fluids- Provide specific nutrition/hydration education as appropriate- Include patient/family/caregiver in decisions related to nutrition  Outcome: Progressing

## 2025-04-29 NOTE — PROGRESS NOTES
Progress Note - Surgery-General   Name: Zhane García 60 y.o. female I MRN: [id number]  Unit/Bed#: W -01 I Date of Admission: 4/27/2025   Date of Service: 4/29/2025 I Hospital Day: 2    Assessment & Plan  Colitis  - Hematochezia and purulent discharge in setting of colitis. History of ischemic colitis in 2014. Colonoscopy in 2016 with marked sigmoid diverticulosis, diffuse moderate scarring in descending colon. 2018 colonoscopy with sigmoid diverticulosis and no evidence of colitis, though with poor colonic preparation. 2022 colonoscopy with descending colon-scarring noted.   - 4/27 CT AP with diffuse wall thickening throughout the descending and proximal sigmoid colon, with loss of haustration. Labs on admission: WBC 9.75, Hgb 14.1, Lactate 0.9. No recent illnesses.   - no acute surgical intervention   - GI consult, appreciate recs  - Continue IVF  - on PO vanc for cdiff colonization, per primary   - Outpatient follow up with colorectal surgery.  - on clears can advance as tolerated, please reach out to general surgery w any questions or concerns   Abdominal pain  - See above  Hematochezia  - See above        Subjective   Doing well, pain well controlled, tolerated liquids without nausea or emesis, passing flatus and no bloody BM overnight, voiding.     Objective :  Temp:  [97.6 °F (36.4 °C)-98.1 °F (36.7 °C)] 98.1 °F (36.7 °C)  HR:  [69-91] 77  BP: (134-142)/(57-88) 141/67  Resp:  [15-18] 17  SpO2:  [92 %-97 %] 95 %  O2 Device: None (Room air)    I/O         04/27 0701  04/28 0700 04/28 0701  04/29 0700 04/29 0701  04/30 0700    P.O.  360     I.V. (mL/kg) 747.5 (6.6) 2255.4 (20)     IV Piggyback 2250      Total Intake(mL/kg) 2997.5 (26.5) 2615.4 (23.1)     Urine (mL/kg/hr)  0 (0)     Stool  0     Total Output  0     Net +2997.5 +2615.4            Unmeasured Urine Occurrence 2 x 6 x     Unmeasured Stool Occurrence 1 x 4 x             Physical Exam    General: NAD  Skin: Warm, dry, anicteric  HEENT:  Normocephalic, atraumatic  CV: RRR, no m/r/g  Pulm: CTA b/l, no inc WOB  Abd: Soft, ND, minimally tender diffusely   MSK: Symmetric, no edema, no tenderness, no deformity  Neuro: AOx3, GCS 15       Lab Results: I have reviewed the following results:  Recent Labs     04/27/25  1835 04/28/25  0416 04/28/25  1152 04/29/25  0607   WBC 9.75 8.20  --  6.59   HGB 14.1 12.1   < > 12.2   HCT 43.5 37.0   < > 36.3    246  --  253   SODIUM 138 138  --  141   K 4.1 3.8  --  3.9    108  --  109*   CO2 23 23  --  27   BUN 17 13  --  6   CREATININE 0.87 0.83  --  0.73   GLUC 85 80  --  84   MG  --  2.0  --   --    PHOS  --  3.3  --   --    AST 25 20  --  18   ALT 39 30  --  23   ALB 4.4 3.7  --  3.6   TBILI 0.65 0.66  --  0.41   ALKPHOS 69 55  --  52   LACTICACID 0.9  --   --   --     < > = values in this interval not displayed.             VTE Pharmacologic Prophylaxis: VTE covered by:    None     VTE Mechanical Prophylaxis: sequential compression device

## 2025-04-29 NOTE — ASSESSMENT & PLAN NOTE
Patient initially with 5-7 nonbloody bowel movements followed by at least 7-10 bloody bowel movements daily for 5 days prior to arrival.  CT with left-sided colitis.  C. difficile testing came back positive PCR negative toxin.  Suspect infectious/ischemic colitis.  No leukocytosis.  Hemodynamically stable.    -In the setting of colitis, severe diarrhea recommend treatment of C. difficile.  -Recommend vancomycin 125 mg 4 times daily x 10 days  - Will advance to low fiber/low residue/nondairy diet  - Fluids per primary team.  - Monitor for any fevers or leukocytosis.    -Due to improvement of symptoms, we will hold off on any inpatient endoscopic evaluation.  Will plan for repeat colonoscopy in 6 to 8 weeks.  Patient is aware.  - Discussed good hand hygiene at home, washing hands with soap and water, cleaning surfaces with bleach based products.

## 2025-04-29 NOTE — PHYSICAL THERAPY NOTE
Physical Therapy Screen    Patient Name: Zhane García  Today's Date: 4/29/2025  Problem List  Principal Problem:    Abdominal pain  Active Problems:    Colitis    Hematochezia    GERD (gastroesophageal reflux disease)    Acute on chronic back pain       04/29/25 0855   PT Last Visit   PT Visit Date 04/29/25   Note Type   Note type Screen   Additional Comments PT consult received and chart reviewed, PT screen completed. Pt's most recent AMPAC = 24, indicating full independence with mobility. Spoke with DRISS Davidson whom confirms that pt has been independently mobilizing and there is no need for skilled PT services in the acute care setting. If acute on chronic back pain persists, recommend ambulatory referral to OPPT upon d/c. Will screen from PT caseload at this time. Please reconsult if pt's functional status significantly changes.       Zhane Mcbride, PT, DPT   Available via [employer]  NPI # [id number]  [place] License - [id number]  4/29/2025

## 2025-04-29 NOTE — ASSESSMENT & PLAN NOTE
- Hematochezia and purulent discharge in setting of colitis. History of ischemic colitis in 2014. Colonoscopy in 2016 with marked sigmoid diverticulosis, diffuse moderate scarring in descending colon. 2018 colonoscopy with sigmoid diverticulosis and no evidence of colitis, though with poor colonic preparation. 2022 colonoscopy with descending colon-scarring noted.   - 4/27 CT AP with diffuse wall thickening throughout the descending and proximal sigmoid colon, with loss of haustration. Labs on admission: WBC 9.75, Hgb 14.1, Lactate 0.9. No recent illnesses.   - no acute surgical intervention   - GI consult, appreciate recs  - Continue IVF  - on PO vanc for cdiff colonization, per primary   - Outpatient follow up with colorectal surgery.  - on clears can advance as tolerated, please reach out to general surgery w any questions or concerns

## 2025-04-29 NOTE — UTILIZATION REVIEW
Continued Stay Review    Date: 4/29                           Current Patient Class: Inpatient  Current Level of Care:  MS     HPI:60 y.o. female initially admitted on 4/27/25      Current Diagnosis: abdominal pain , hematochezia , L sided Colitis . C. difficile testing came back positive PCR negative toxin General sx and GI following .     Assessment/Plan: 4/29    IV Rocephin and IV Flagyl d/courtney yesterday afternoon . Pt on po vanco x 10 days for C dif colonization . Pt states pain well controlled , reports some L sided abdominal burning .  On exam, abdomen minimally  diffusely tender . Tolerating CL diet w/o N/V . . ON IV PPI BID . Pt passing flatus, no bloody bm overnight .  CL diet- advance as lulu to low fiber/low residue/nondairy diet . IVF infusing . Monitor WBC's, fever curve .Per GI,  due to improvement of symptoms, we will hold off on any inpatient endoscopic evaluation. Will plan for repeat colonoscopy in 6 to 8 weeks.     Medications:   Scheduled Medications:  aspirin, 81 mg, Oral, Daily  lidocaine, 1 patch, Topical, Daily  pantoprazole, 40 mg, Intravenous, Q12H CARLOS  vancomycin oral (capsules or solution), 125 mg, Oral, Q6H CARLOS    metroNIDAZOLE (FLAGYL) IVPB (premix) 500 mg 100 mL  Dose: 500 mg  Freq: Every 12 hours Route: IV  Last Dose: Stopped (04/28/25 0602)  Start: 04/28/25 0600 End: 04/28/25 1540  ceftriaxone (ROCEPHIN) 1 g/50 mL in dextrose IVPB  Dose: 1,000 mg  Freq: Every 24 hours Route: IV  Last Dose: Stopped (04/28/25 0545)  Start: 04/28/25 0600 End: 04/28/25 1540      Continuous IV Infusions:  multi-electrolyte, 75 mL/hr, Intravenous, Continuous      PRN Meds:  acetaminophen, 1,000 mg, Intravenous, Q6H PRN  aluminum-magnesium hydroxide-simethicone, 30 mL, Oral, Q6H PRN  ondansetron, 4 mg, Intravenous, Q6H PRN  polyethylene glycol, 17 g, Oral, Daily PRN      Discharge Plan: TBD     Vital Signs (last 3 days)       Date/Time Temp Pulse Resp BP MAP (mmHg) SpO2 O2 Device Patient Position -  Orthostatic VS [place] Coma Scale Score Pain    04/29/25 07:54:34 98.1 °F (36.7 °C) 77 17 141/67 92 95 % -- -- -- --    04/29/25 07:54:19 98.1 °F (36.7 °C) 79 16 141/67 92 95 % -- -- -- --    04/29/25 01:05:08 97.6 °F (36.4 °C) 75 15 142/67 92 94 % -- -- -- --    04/29/25 0000 -- 75 -- -- -- 94 % -- -- -- --    04/28/25 2345 -- 76 -- -- -- 95 % -- -- -- --    04/28/25 2330 -- 79 -- -- -- 97 % -- -- -- --    04/28/25 2315 -- 73 -- -- -- 92 % -- -- -- --    04/28/25 2300 -- 69 -- -- -- 93 % -- -- -- --    04/28/25 2200 -- 77 -- -- -- 95 % None (Room air) -- 15 No Pain    04/28/25 2145 -- 79 -- -- -- 93 % -- -- -- --    04/28/25 2130 -- 77 -- -- -- 94 % -- -- -- --    04/28/25 2115 -- 83 -- -- -- 94 % -- -- -- --    04/28/25 2100 -- 78 -- -- -- 95 % -- -- -- --    04/28/25 2030 -- 73 -- -- -- 93 % -- -- -- --    04/28/25 2015 -- 83 -- -- -- 96 % -- -- -- --    04/28/25 2000 -- 79 -- 134/88 103 95 % -- -- -- --    04/28/25 19:56:50 97.8 °F (36.6 °C) 91 17 134/88 103 94 % -- -- -- --    04/28/25 1100 98 °F (36.7 °C) -- 18 134/57 77 -- -- Lying -- --    04/28/25 0800 -- -- -- -- -- -- -- -- 15 --    04/28/25 0747 97.8 °F (36.6 °C) -- -- 113/68 83 -- None (Room air) Lying -- --    04/28/25 0734 -- -- -- -- -- -- -- -- -- No Pain    04/28/25 0518 -- -- -- -- -- -- -- -- 15 --    04/28/25 0514 97.6 °F (36.4 °C) 76 18 113/65 81 99 % None (Room air) Lying -- --    04/28/25 0415 -- 72 18 131/61 88 97 % None (Room air) Sitting -- --    04/28/25 0012 -- -- -- -- -- -- -- -- 15 --    04/28/25 0011 97.7 °F (36.5 °C) 78 18 141/68 98 98 % None (Room air) Lying -- 6    04/27/25 2119 -- 84 -- 158/70 101 99 % None (Room air) -- -- --    04/27/25 1750 97.8 °F (36.6 °C) 102 18 141/66 95 95 % None (Room air) Sitting -- --          Weight (last 2 days)       Date/Time Weight    04/28/25 0514 113 (248.68)            Pertinent Labs/Diagnostic Results:   Radiology:  CT abdomen pelvis with contrast   Final Interpretation by Rolando Ribeiro  MD Jameson (04/27 2017)      1.  There is diffuse wall thickening throughout the descending and proximal sigmoid colon, with loss of haustration. This is consistent with a nonspecific colitis. The differential diagnosis includes infectious, inflammatory and ischemic etiologies.   2.  Please refer to the report body for description of other incidental, chronic and/or benign findings.         Workstation performed: [id number]           Cardiology:  No orders to display     GI:  No orders to display           Results from last 7 days   Lab Units 04/29/25  0607 04/28/25  2336 04/28/25  1152 04/28/25  0416 04/27/25  1835   WBC Thousand/uL 6.59  --   --  8.20 9.75   HEMOGLOBIN g/dL 12.2 12.2 11.8 12.1 14.1   HEMATOCRIT % 36.3 37.4 36.4 37.0 43.5   PLATELETS Thousands/uL 253  --   --  246 312   TOTAL NEUT ABS Thousands/µL 3.98  --   --  4.88 6.00         Results from last 7 days   Lab Units 04/29/25  0607 04/28/25 0416 04/27/25  1835   SODIUM mmol/L 141 138 138   POTASSIUM mmol/L 3.9 3.8 4.1   CHLORIDE mmol/L 109* 108 105   CO2 mmol/L 27 23 23   ANION GAP mmol/L 5 7 10   BUN mg/dL 6 13 17   CREATININE mg/dL 0.73 0.83 0.87   EGFR ml/min/1.73sq m 90 77 73   CALCIUM mg/dL 8.4 8.0* 9.1   MAGNESIUM mg/dL  --  2.0  --    PHOSPHORUS mg/dL  --  3.3  --      Results from last 7 days   Lab Units 04/29/25  0607 04/28/25  0416 04/27/25  1835   AST U/L 18 20 25   ALT U/L 23 30 39   ALK PHOS U/L 52 55 69   TOTAL PROTEIN g/dL 6.0* 6.3* 7.6   ALBUMIN g/dL 3.6 3.7 4.4   TOTAL BILIRUBIN mg/dL 0.41 0.66 0.65         Results from last 7 days   Lab Units 04/29/25  0607 04/28/25  0416 04/27/25  1835   GLUCOSE RANDOM mg/dL 84 80 85                   Results from last 7 days   Lab Units 04/28/25  0416   TSH 3RD GENERATON uIU/mL 0.966         Results from last 7 days   Lab Units 04/27/25  1835   LACTIC ACID mmol/L 0.9               Results from last 7 days   Lab Units 04/27/25  1835   LIPASE u/L 16     Results from last 7 days   Lab Units  04/28/25  0416   CRP mg/L 8.8*                                 Results from last 7 days   Lab Units 04/28/25  0546   C DIFF TOXIN B BY PCR  Positive*     Results from last 7 days   Lab Units 04/28/25  0546   SALMONELLA SP PCR  Negative   SHIGELLA SP/ENTEROINVASIVE E. COLI (EIEC)  Negative   CAMPYLOBACTER SP (JEJUNI AND COLI)  Negative   SHIGA TOXIN 1/SHIGA TOXIN 2  Negative                           Network Utilization Review Department  ATTENTION: Please call with any questions or concerns to [phone] and carefully listen to the prompts so that you are directed to the right person. All voicemails are confidential.   For Discharge needs, contact Care Management DC Support Team at [phone] opt. [phone]  Send all requests for admission clinical reviews, approved or denied determinations and any other requests to dedicated fax number below belonging to the campus where the patient is receiving treatment. List of dedicated fax numbers for the Facilities:  FACILITY NAME UR FAX NUMBER   ADMISSION DENIALS (Administrative/Medical Necessity) [phone]   DISCHARGE SUPPORT TEAM (NETWORK) [phone]   PARENT CHILD HEALTH (Maternity/NICU/Pediatrics) [phone]   [facility] [phone]   [facility] [phone]   [facility] [phone]   [facility] [phone]   [facility] [phone]   [facility] [phone]   [facility] [phone]   [facility] [phone]   [facility] [phone]   [facility] [phone]   [facility] [phone]   [facility] [phone]

## 2025-04-30 ENCOUNTER — TELEPHONE (OUTPATIENT)
Age: 60
End: 2025-04-30

## 2025-04-30 VITALS
WEIGHT: 248.68 LBS | HEIGHT: 64 IN | RESPIRATION RATE: 18 BRPM | HEART RATE: 69 BPM | SYSTOLIC BLOOD PRESSURE: 132 MMHG | TEMPERATURE: 97.7 F | BODY MASS INDEX: 42.46 KG/M2 | DIASTOLIC BLOOD PRESSURE: 79 MMHG | OXYGEN SATURATION: 94 %

## 2025-04-30 DIAGNOSIS — K52.9 COLITIS: Primary | ICD-10-CM

## 2025-04-30 PROCEDURE — 99239 HOSP IP/OBS DSCHRG MGMT >30: CPT | Performed by: INTERNAL MEDICINE

## 2025-04-30 RX ORDER — PANTOPRAZOLE SODIUM 40 MG/1
40 TABLET, DELAYED RELEASE ORAL
Status: DISCONTINUED | OUTPATIENT
Start: 2025-04-30 | End: 2025-04-30 | Stop reason: HOSPADM

## 2025-04-30 RX ORDER — ACETAMINOPHEN 325 MG/1
975 TABLET ORAL EVERY 6 HOURS PRN
Status: DISCONTINUED | OUTPATIENT
Start: 2025-04-30 | End: 2025-04-30 | Stop reason: HOSPADM

## 2025-04-30 RX ORDER — VANCOMYCIN HYDROCHLORIDE 125 MG/1
125 CAPSULE ORAL EVERY 6 HOURS SCHEDULED
Qty: 33 CAPSULE | Refills: 0 | Status: SHIPPED | OUTPATIENT
Start: 2025-04-30 | End: 2025-05-09

## 2025-04-30 RX ADMIN — PANTOPRAZOLE SODIUM 40 MG: 40 INJECTION, POWDER, FOR SOLUTION INTRAVENOUS at 08:47

## 2025-04-30 RX ADMIN — VANCOMYCIN HYDROCHLORIDE 125 MG: 125 CAPSULE ORAL at 06:00

## 2025-04-30 RX ADMIN — ASPIRIN 81 MG: 81 TABLET, CHEWABLE ORAL at 08:47

## 2025-04-30 RX ADMIN — VANCOMYCIN HYDROCHLORIDE 125 MG: 125 CAPSULE ORAL at 00:33

## 2025-04-30 NOTE — ASSESSMENT & PLAN NOTE
Chief Complaint   Patient presents with   • Cough     yellow phlegm-seen by  twice-given Cefdinir-told flu but never actually tested   • Sinus Problem     yellow discharge   • Back Pain     tx with Bactrim per  for UTI but stopped Bactrim to start Cefdinir       Subjective     Wendi Gill is a 50 y.o. female being seen for a follow up appointment today regarding  URI and UTI. She was seen in the urgent care, started on Bactrim for UTI. She ended up with sinus issues and producitve cough of yellow phlegm. She went back to  Wednesday, and she was switched to Omnicef 300 mg BID 1 week ago. She is still has a cough of yellow mucous,  SOA, fatigue.     She is having some flank pain with cough, but no bllod in urine. She has history of kidney stones.       History of Present Illness     Allergies   Allergen Reactions   • Latex          Current Outpatient Medications:   •  cefdinir (OMNICEF) 300 MG capsule, Take 300 mg by mouth Every 12 (Twelve) Hours., Disp: , Rfl:   •  escitalopram (LEXAPRO) 10 MG tablet, Take 1 tablet by mouth Daily., Disp: 90 tablet, Rfl: 3  •  montelukast (SINGULAIR) 10 MG tablet, TAKE 1 TABLET BY MOUTH EVERY NIGHT, Disp: 90 tablet, Rfl: 1  •  cetirizine (zyrTEC) 10 MG tablet, Take 1 tablet by mouth Daily., Disp: 30 tablet, Rfl: 1  •  rizatriptan (MAXALT) 10 MG tablet, Take 1 tablet by mouth 1 (One) Time As Needed for Migraine for up to 1 dose. May repeat in 2 hours if needed, Disp: 18 tablet, Rfl: 6  •  TROKENDI XR 50 MG capsule sustained-release 24 hr, TAKE 1 CAPSULE BY MOUTH EVERY NIGHT, Disp: 90 capsule, Rfl: 1    The following portions of the patient's history were reviewed and updated as appropriate: allergies, current medications, past family history, past medical history, past social history, past surgical history and problem list.    Review of Systems   Constitutional: Positive for fatigue.   HENT: Positive for postnasal drip, rhinorrhea and sinus pressure.    Eyes: Negative.   History of ischemic colitis  CT abdomen and pelvis on admission shows diffuse wall thickening throughout the descending and proximal sigmoid colon with loss of postradiation, this is consistent with colitis  C. difficile toxin positive by PCR but EIA negative.  But given her symptoms patient was started on oral vancomycin.  Reports significant improvement in her symptoms.  Almost resolved.  No diarrhea since yesterday evening.  Tolerating diet well.  GI following.  Patient medically stable for discharge.  Will discharge on p.o. vancomycin to complete total 10 days.       Respiratory: Positive for cough, chest tightness and shortness of breath. Negative for wheezing.    Cardiovascular: Negative.    Gastrointestinal: Negative.    Endocrine: Negative.    Genitourinary: Positive for flank pain.   Musculoskeletal: Positive for myalgias.        Generalized aches   Skin: Negative.    Allergic/Immunologic: Negative.    Neurological: Negative.    Hematological: Negative.    Psychiatric/Behavioral: Negative.        Assessment     Physical Exam   Constitutional: She is oriented to person, place, and time. She appears well-developed and well-nourished. No distress.   HENT:   Head: Normocephalic.   Right Ear: External ear normal.   Left Ear: External ear normal.   Nose: Nose normal.   Mouth/Throat: Oropharynx is clear and moist. No oropharyngeal exudate.   Neck: Neck supple. No thyromegaly present.   Cardiovascular: Normal rate, regular rhythm and normal heart sounds.   No murmur heard.  Pulmonary/Chest: Effort normal. No stridor. No respiratory distress. She has decreased breath sounds. She has wheezes. She has rhonchi in the left upper field and the left middle field. She has no rales.   Musculoskeletal: She exhibits no edema.   Neurological: She is alert and oriented to person, place, and time.   Skin: Skin is warm and dry.   Psychiatric: She has a normal mood and affect. Her behavior is normal.   Vitals reviewed.      Plan       Wendi was seen today for cough, sinus problem, flank pain and generalized body aches.    Diagnoses and all orders for this visit:    Acute bronchitis, unspecified organism  -     XR Chest PA & Lateral  -     mometasone-formoterol (DULERA) 200-5 MCG/ACT inhaler; Inhale 2 puffs 2 (Two) Times a Day.  -     predniSONE (DELTASONE) 20 MG tablet; Take 1 tablet by mouth Daily.    Body aches  -     POCT Influenza A/B    Flank pain  -     POCT urinalysis dipstick, automated    Cough  -     POCT Influenza A/B  -     XR Chest PA & Lateral    Wheezing on exhalation  -      mometasone-formoterol (DULERA) 200-5 MCG/ACT inhaler; Inhale 2 puffs 2 (Two) Times a Day.  -     predniSONE (DELTASONE) 20 MG tablet; Take 1 tablet by mouth Daily.        Urine is clear in office today, no culture indicated.    Suspicious for CAP, will get a CXR.    Follow up after CXR is completed.

## 2025-04-30 NOTE — TELEPHONE ENCOUNTER
Scheduled date of colonoscopy (as of today): 07/14/2025  Physician performing colonoscopy: DR BARRIENTOS  [facility] of colonoscopy[facility]  Bowel prep reviewed with patient: GOLYTELY  Instructions reviewed with patient by: Amanda via telephone. Procedure directions sent via [employer].  Clearances:

## 2025-04-30 NOTE — ASSESSMENT & PLAN NOTE
Reports chronic back pain, however, states pain has been exacerbated by GI events  Chart was reviewed and noted patient was following with physical therapy in 2024 due to vertigo , appears to be resolved  Etiology of acute on chronic back pain likely secondary to possible colitis flare/abdominal pain  Now resolved

## 2025-04-30 NOTE — PLAN OF CARE
Problem: PAIN - ADULT  Goal: Verbalizes/displays adequate comfort level or baseline comfort level  Description: Interventions:- Encourage patient to monitor pain and request assistance- Assess pain using appropriate pain scale- Administer analgesics based on type and severity of pain and evaluate response- Implement non-pharmacological measures as appropriate and evaluate response- Consider cultural and social influences on pain and pain management- Notify physician/advanced practitioner if interventions unsuccessful or patient reports new pain  Outcome: Progressing     Problem: INFECTION - ADULT  Goal: Absence or prevention of progression during hospitalization  Description: INTERVENTIONS:- Assess and monitor for signs and symptoms of infection- Monitor lab/diagnostic results- Monitor all insertion sites, i.e. indwelling lines, tubes, and drains- Monitor endotracheal if appropriate and nasal secretions for changes in amount and color- [place] appropriate cooling/warming therapies per order- Administer medications as ordered- Instruct and encourage patient and family to use good hand hygiene technique- Identify and instruct in appropriate isolation precautions for identified infection/condition  Outcome: Progressing  Goal: Absence of fever/infection during neutropenic period  Description: INTERVENTIONS:- Monitor WBC  Outcome: Progressing     Problem: DISCHARGE PLANNING  Goal: Discharge to home or other facility with appropriate resources  Description: INTERVENTIONS:- Identify barriers to discharge w/patient and caregiver- Arrange for needed discharge resources and transportation as appropriate- Identify discharge learning needs (meds, wound care, etc.)- Arrange for interpretive services to assist at discharge as needed- Refer to Case Management Department for coordinating discharge planning if the patient needs post-hospital services based on physician/advanced practitioner order or complex needs related to  functional status, cognitive ability, or social support system  Outcome: Progressing     Problem: Knowledge Deficit  Goal: Patient/family/caregiver demonstrates understanding of disease process, treatment plan, medications, and discharge instructions  Description: Complete learning assessment and assess knowledge base.Interventions:- Provide teaching at level of understanding- Provide teaching via preferred learning methods  Outcome: Progressing     Problem: Nutrition/Hydration-ADULT  Goal: Nutrient/Hydration intake appropriate for improving, restoring or maintaining nutritional needs  Description: Monitor and assess patient's nutrition/hydration status for malnutrition. Collaborate with interdisciplinary team and initiate plan and interventions as ordered.  Monitor patient's weight and dietary intake as ordered or per policy. Utilize nutrition screening tool and intervene as necessary. Determine patient's food preferences and provide high-protein, high-caloric foods as appropriate. INTERVENTIONS:- Monitor oral intake, urinary output, labs, and treatment plans- Assess nutrition and hydration status and recommend course of action- Evaluate amount of meals eaten- Assist patient with eating if necessary - Allow adequate time for meals- Recommend/ encourage appropriate diets, oral nutritional supplements, and vitamin/mineral supplements- Order, calculate, and assess calorie counts as needed- Recommend, monitor, and adjust tube feedings and TPN/PPN based on assessed needs- Assess need for intravenous fluids- Provide specific nutrition/hydration education as appropriate- Include patient/family/caregiver in decisions related to nutrition  Outcome: Progressing

## 2025-04-30 NOTE — PROGRESS NOTES
The pantoprazole has / have been converted to Oral per [facility] IV-to-PO Auto-Conversion Protocol for Adults as approved by the Pharmacy and Therapeutics Committee. The patient met all eligible criteria:  1) Age = 18 years old   2) Received at least one dose of the IV form   3) Receiving at least one other scheduled oral/enteral medication   4) Tolerating an oral/enteral diet   and did not have any exclusions:   1) Critical care patient   2) Active GI bleed (IF assessing H2RAs or PPIs)   3) Continuous tube feeding (IF assessing cipro, doxycycline, levofloxacin, minocycline, rifampin, or voriconazole)   4) Receiving PO vancomycin (IF assessing metronidazole)   5) Persistent nausea and/or vomiting   6) Ileus or gastrointestinal obstruction   7) Ricki/nasogastric tube set for continuous suction   8) Specific order not to automatically convert to PO (in the order's comments or if discussed in the most recent Infectious Disease or primary team's progress notes).

## 2025-04-30 NOTE — TELEPHONE ENCOUNTER
----- Message from Kenia LOUISE sent at 4/30/2025  8:43 AM EDT -----    ----- Message -----  From: Dahlia Mahajan PA-C  Sent: 4/30/2025   8:18 AM EDT  To: [facility]    Please call patient for outpatient colonoscopy in 6 to 8 weeks due to colitis with Dr Patton. Recommend [place] prep. Thank you!

## 2025-04-30 NOTE — DISCHARGE SUMMARY
Discharge Summary - Hospitalist   Name: Zhane García 60 y.o. female I MRN: [id number]  Unit/Bed#: W -01 I Date of Admission: 4/27/2025   Date of Service: 4/30/2025 I Hospital Day: 3     Assessment & Plan  C. difficile colitis  History of ischemic colitis  CT abdomen and pelvis on admission shows diffuse wall thickening throughout the descending and proximal sigmoid colon with loss of postradiation, this is consistent with colitis  C. difficile toxin positive by PCR but EIA negative.  But given her symptoms patient was started on oral vancomycin.  Reports significant improvement in her symptoms.  Almost resolved.  No diarrhea since yesterday evening.  Tolerating diet well.  GI following.  Patient medically stable for discharge.  Will discharge on p.o. vancomycin to complete total 10 days.    Acute on chronic back pain  Reports chronic back pain, however, states pain has been exacerbated by GI events  Chart was reviewed and noted patient was following with physical therapy in 2024 due to vertigo , appears to be resolved  Etiology of acute on chronic back pain likely secondary to possible colitis flare/abdominal pain  Now resolved    GERD (gastroesophageal reflux disease)  Protonix twice daily     Medical Problems       Resolved Problems  Date Reviewed: 9/27/2024   None       Discharging Physician / Practitioner: Sandor Story MD  PCP: Roque Lynne MD  Admission Date:   Admission Orders (From admission, onward)       Ordered        04/27/25 2118  INPATIENT ADMISSION  Once                          Discharge Date: 04/30/25    Consultations During Hospital Stay:  GI      Reason for Admission: colitis    Hospital Course:   Zhane García is a 60 y.o. female patient who originally presented to the hospital on 4/27/2025 due to colitis and diarrhea.  Suspected most likely from C. difficile.  Had significant provide after she was started on oral vancomycin.  To be discharged on oral vancomycin to complete  "total 10 days of treatment.          Please see above list of diagnoses and related plan for additional information.     Condition at Discharge: good    Discharge Day Visit / Exam:   Subjective:  Pt seen and examined by me in morning.  Says she had a bowel movement this morning and had some flaky things but overall diarrhea has significantly slowed down.  Only 1 loose bowel movement yesterday.  No blood in stool.  No abdominal pain.  Tolerating diet well.    Vitals: Blood Pressure: 132/79 (04/30/25 0748)  Pulse: 69 (04/30/25 0748)  Temperature: 97.7 °F (36.5 °C) (04/30/25 0748)  Temp Source: Oral (04/29/25 0105)  Respirations: 18 (04/29/25 2236)  Height: 5' 4\" (162.6 cm) (04/28/25 0514)  Weight - Scale: 113 kg (248 lb 10.9 oz) (04/28/25 0514)  SpO2: 94 % (04/30/25 0748)    Physical Exam   Constitutional: Pt appears comfortable. Not in any acute distress.  Cardiovascular: Normal rate, regular rhythm, normal heart sounds.  No murmur heard.  Pulmonary/Chest: Effort normal, air entry b/l equal. No respiratory distress. Pt has no wheezes or crackles.   Abdominal: Soft. Non-distended, Non-tender. Bowel sounds are normal.   Musculoskeletal: Normal range of motion.   Neurological: awake, alert. Moving all extremities spontaneously.  Psychiatric: normal mood and affect.      Discussion with Family:  pt said she will call he rhusband.     Discharge instructions/Information to patient and family:   See after visit summary for information provided to patient and family.      Provisions for Follow-Up Care:  See after visit summary for information related to follow-up care and any pertinent home health orders.      Mobility at time of Discharge:   Basic Mobility Inpatient Raw Score: 24  JH-HLM Goal: 8: Walk 250 feet or more  JH-HLM Achieved: 8: Walk 250 feet ot more  HLM Goal achieved. Continue to encourage appropriate mobility.     Disposition:   Home    Planned Readmission: no    Discharge Medications:  See after visit summary " for reconciled discharge medications provided to patient and/or family.      Administrative Statements   Discharge Statement:  I have spent a total time of 35 minutes in caring for this patient on the day of the visit/encounter. >30 minutes of time was spent on: Diagnostic results, Prognosis, Risks and benefits of tx options, Patient and family education, Importance of tx compliance, Counseling / Coordination of care, Documenting in the medical record, and Reviewing / ordering tests, medicine, procedures  .    **Please Note: This note may have been constructed using a voice recognition system**

## 2025-04-30 NOTE — TELEPHONE ENCOUNTER
Left message for patient to call and schedule colonoscopy with Dr. Patton in 6-8 weeks. Will call her again in 1 wk if she doesn't return call.

## 2025-04-30 NOTE — PROGRESS NOTES
The acetaminophen has / have been converted to Oral per [facility] IV-to-PO Auto-Conversion Protocol for Adults as approved by the Pharmacy and Therapeutics Committee. The patient met all eligible criteria:  1) Age = 18 years old   2) Received at least one dose of the IV form   3) Receiving at least one other scheduled oral/enteral medication   4) Tolerating an oral/enteral diet   and did not have any exclusions:   1) Critical care patient   2) Active GI bleed (IF assessing H2RAs or PPIs)   3) Continuous tube feeding (IF assessing cipro, doxycycline, levofloxacin, minocycline, rifampin, or voriconazole)   4) Receiving PO vancomycin (IF assessing metronidazole)   5) Persistent nausea and/or vomiting   6) Ileus or gastrointestinal obstruction   7) Ricki/nasogastric tube set for continuous suction   8) Specific order not to automatically convert to PO (in the order's comments or if discussed in the most recent Infectious Disease or primary team's progress notes).

## 2025-05-01 NOTE — UTILIZATION REVIEW
NOTIFICATION OF ADMISSION DISCHARGE   This is a Notification of Discharge from [facility]. Please be advised that this patient has been discharge from our facility. Below you will find the admission and discharge date and time including the patient’s disposition.   UTILIZATION REVIEW CONTACT:  Utilization Review Assistants  Network Utilization Review Department  Phone: [phone] x carefully listen to the prompts. All voicemails are confidential.  Email: [email or web address]     ADMISSION INFORMATION  PRESENTATION DATE: 4/27/2025  5:53 PM  OBERVATION ADMISSION DATE: N/A  INPATIENT ADMISSION DATE: 4/27/25  9:19 PM   DISCHARGE DATE: 4/30/2025 10:34 AM   DISPOSITION:Home/Self Care    Network Utilization Review Department  ATTENTION: Please call with any questions or concerns to [phone] and carefully listen to the prompts so that you are directed to the right person. All voicemails are confidential.   For Discharge needs, contact Care Management DC Support Team at [phone] opt. 2  Send all requests for admission clinical reviews, approved or denied determinations and any other requests to dedicated fax number below belonging to the campus where the patient is receiving treatment. List of dedicated fax numbers for the Facilities:  FACILITY NAME UR FAX NUMBER   ADMISSION DENIALS (Administrative/Medical Necessity) [phone]   DISCHARGE SUPPORT TEAM ([facility]) [phone]   PARENT CHILD HEALTH (Maternity/NICU/Pediatrics) [phone]   [facility] [phone]   [facility] [phone]   [facility] [phone]   [facility] [phone]   Critical access hospital [phone]   [facility] [phone]   [facility] [phone]   [facility] [phone]   [facility]  [facility] [phone]   [facility] [phone]   [facility] [phone]   [facility] [phone]

## 2025-05-08 ENCOUNTER — TELEPHONE (OUTPATIENT)
Age: 60
End: 2025-05-08

## 2025-05-08 NOTE — TELEPHONE ENCOUNTER
Detail Level: Detailed Spoke with pt, reviewed provider recommendations. Per our discussion [facility] [employer] Pt has f/u with PCP tomorrow, will request short term disability from him. If he refuses pt asking if we can fill out, advised we can request but provider's have protocol to follow in regards to that and we may not be able to accommodate.

## 2025-05-08 NOTE — TELEPHONE ENCOUNTER
Pt calling in asking to speak with Dahlia ENCISO. She finished last dose of vanco today- stools are soft/ formed and going about 4x a day. She tried eating toast this morning and right after had to have a BM. She went back to liquids but is asking for diet info.     Pt still having abdominal cramping/ burning pain- she can not wear tight pants and in the car the bumps in the road cause pain. She did not try tylenol.     Please advise recs for pt and would you like pt to be on low residue diet?

## 2025-05-08 NOTE — TELEPHONE ENCOUNTER
Bowel movements can be slightly irregular with more gas, bloating or abdominal cramping after ischemic colitis especially with C. difficile as well.  Agree with low residue/low fiber diet and avoiding dairy products for now.  If any severe worsening abdominal pain or development of fevers or bloody stools recommend going back to the emergency room.  If any development of diarrhea again would recommend reaching out to consider repeat stool studies. Thank you!

## 2025-05-09 ENCOUNTER — EVALUATION (OUTPATIENT)
Dept: PHYSICAL THERAPY | Facility: CLINIC | Age: 60
End: 2025-05-09
Payer: COMMERCIAL

## 2025-05-09 DIAGNOSIS — H81.11 BPPV (BENIGN PAROXYSMAL POSITIONAL VERTIGO), RIGHT: Primary | ICD-10-CM

## 2025-05-09 PROCEDURE — 95992 CANALITH REPOSITIONING PROC: CPT | Performed by: PHYSICAL THERAPIST

## 2025-05-09 PROCEDURE — 97161 PT EVAL LOW COMPLEX 20 MIN: CPT | Performed by: PHYSICAL THERAPIST

## 2025-05-09 NOTE — PROGRESS NOTES
PT EVALUATION     Today's date: 25  Patient name: Zhane García  : 1965  MRN: [id number]  Referring provider: Mirza English, PT  Dx:   1. Vertigo          Zhane García is a 58 y.o. female who presents recurrence of BPPV.  Kallie-Hallpike was positive on the right.  Epley performed 3 times right and Li maneuver 1x bilaterally.  She was given Li maneuver to perform at home.   This patient would benefit from skilled physical therapy to address their listed impairments and functional limitations to maximize functional outcome.     Impairments:    activity intolerance   Positional dizziness     Prognosis:  Good  Positive and negative prognostic indicator(s):  prior success with conservative care     Goals:    STG Patient is independent with HEP   LTG Balance & endurance & gait & locomotion are improved by 50% in 4 weeks  LTG ADL performance improved to prior level of function in 6 weeks     Planned interventions:  home exercise program, patient education and canalith repositioning techniques     Duration in visits:  6  Frequency: 2 visits per week   Duration in weeks:  3     History of Current Injury: notes sudden onset vertigo this morning when rolling on to her right side this morning.  No nausea.  Did not get dizzy sitting up in bed.  She has had similar symptoms in the past.     Pain descriptors:  Dizziness, spinning  Pain intensity:  8/10     Aggravating factors: rolling in bed  Easing factors: avoiding above     Patient goals:  independence with ADLs     Objective   Kallie-Hallpike: negative left, positive right with torsional nystagmus  Epley position one:  torsional nystagmus, mild 15s duration  Epley head turn left: no symptoms  Epley position 3: severe symptoms/nystagmus last up to 20 seconds  Epley sitting up: no symptoms

## 2025-06-30 ENCOUNTER — ANESTHESIA (OUTPATIENT)
Dept: ANESTHESIOLOGY | Facility: HOSPITAL | Age: 60
End: 2025-06-30

## 2025-06-30 ENCOUNTER — ANESTHESIA EVENT (OUTPATIENT)
Dept: ANESTHESIOLOGY | Facility: HOSPITAL | Age: 60
End: 2025-06-30

## 2025-07-14 ENCOUNTER — HOSPITAL ENCOUNTER (OUTPATIENT)
Dept: GASTROENTEROLOGY | Facility: AMBULARY SURGERY CENTER | Age: 60
Setting detail: OUTPATIENT SURGERY
Discharge: HOME | End: 2025-07-14
Attending: PHYSICIAN ASSISTANT
Payer: COMMERCIAL

## 2025-07-14 ENCOUNTER — ANESTHESIA EVENT (OUTPATIENT)
Dept: GASTROENTEROLOGY | Facility: AMBULARY SURGERY CENTER | Age: 60
End: 2025-07-14
Payer: COMMERCIAL

## 2025-07-14 VITALS
HEART RATE: 78 BPM | HEIGHT: 64 IN | TEMPERATURE: 97.5 F | BODY MASS INDEX: 40.29 KG/M2 | RESPIRATION RATE: 16 BRPM | SYSTOLIC BLOOD PRESSURE: 100 MMHG | WEIGHT: 236 LBS | OXYGEN SATURATION: 98 % | DIASTOLIC BLOOD PRESSURE: 69 MMHG

## 2025-07-14 DIAGNOSIS — K52.9 COLITIS: ICD-10-CM

## 2025-07-14 PROCEDURE — 45385 COLONOSCOPY W/LESION REMOVAL: CPT | Performed by: INTERNAL MEDICINE

## 2025-07-14 PROCEDURE — 88305 TISSUE EXAM BY PATHOLOGIST: CPT | Performed by: PATHOLOGY

## 2025-07-14 RX ORDER — TURMERIC 400 MG
1 CAPSULE ORAL 2 TIMES DAILY
COMMUNITY

## 2025-07-14 RX ORDER — SODIUM CHLORIDE, SODIUM LACTATE, POTASSIUM CHLORIDE, CALCIUM CHLORIDE 600; 310; 30; 20 MG/100ML; MG/100ML; MG/100ML; MG/100ML
125 INJECTION, SOLUTION INTRAVENOUS CONTINUOUS
Status: DISCONTINUED | OUTPATIENT
Start: 2025-07-14 | End: 2025-07-18 | Stop reason: HOSPADM

## 2025-07-14 RX ORDER — PROPOFOL 10 MG/ML
INJECTION, EMULSION INTRAVENOUS AS NEEDED
Status: DISCONTINUED | OUTPATIENT
Start: 2025-07-14 | End: 2025-07-14

## 2025-07-14 RX ADMIN — PROPOFOL 100 MG: 10 INJECTION, EMULSION INTRAVENOUS at 10:05

## 2025-07-14 RX ADMIN — PROPOFOL 50 MG: 10 INJECTION, EMULSION INTRAVENOUS at 10:09

## 2025-07-14 RX ADMIN — SODIUM CHLORIDE, SODIUM LACTATE, POTASSIUM CHLORIDE, AND CALCIUM CHLORIDE: .6; .31; .03; .02 INJECTION, SOLUTION INTRAVENOUS at 09:45

## 2025-07-14 RX ADMIN — SODIUM CHLORIDE, SODIUM LACTATE, POTASSIUM CHLORIDE, AND CALCIUM CHLORIDE: .6; .31; .03; .02 INJECTION, SOLUTION INTRAVENOUS at 10:13

## 2025-07-14 RX ADMIN — PROPOFOL 100 MG: 10 INJECTION, EMULSION INTRAVENOUS at 10:04

## 2025-07-14 NOTE — ANESTHESIA POSTPROCEDURE EVALUATION
Post-Op Assessment Note    CV Status:  Stable  Pain Score: 0    Pain management: adequate       Mental Status:  Alert and awake   Hydration Status:  Euvolemic   PONV Controlled:  Controlled   Airway Patency:  Patent     Post Op Vitals Reviewed: Yes    No anethesia notable event occurred.    Staff: CRNA           Last Filed PACU Vitals:  Vitals Value Taken Time   Temp 98 1015   Pulse 85    BP 90/61    Resp 16    SpO2 99

## 2025-07-14 NOTE — ANESTHESIA PREPROCEDURE EVALUATION
"Procedure:  COLONOSCOPY    Relevant Problems   CARDIO   (+) Transient cerebral ischemia   (+) Vascular insufficiency of intestine (HCC)   (+) Venous insufficiency (chronic) (peripheral)      GI/HEPATIC   (+) GERD (gastroesophageal reflux disease)      /RENAL   (+) Nephrolithiasis      MUSCULOSKELETAL   (+) Acute on chronic back pain      NEURO/PSYCH   (+) Depression   (+) Headache   (+) Numbness and tingling of both legs     Stroke (HCC) unknown origin of 3 \"mini strokes\"  2008, 2012, [facility]           Physical Exam    Airway     Mallampati score: II  TM Distance: >3 FB  Neck ROM: full      Cardiovascular      Dental       Pulmonary      Neurological      Other Findings  post-pubertal.      Anesthesia Plan  ASA Score- 3     Anesthesia Type- IV sedation with anesthesia with ASA Monitors.         Additional Monitors:     Airway Plan: natural airway.           Plan Factors-    Chart reviewed.    Patient summary reviewed.                  Induction-     Postoperative Plan- .   Monitoring Plan - Monitoring plan - standard ASA monitoring      Perioperative Resuscitation Plan - Level 1 - Full Code.       Informed Consent- Anesthetic plan and risks discussed with patient.  I personally reviewed this patient with the CRNA. Discussed and agreed on the Anesthesia Plan with the CRNA..      "

## 2025-07-14 NOTE — H&P
"History and Physical -  [facility]  Zhane TASNEEM García 60 y.o. female MRN: [id number]        HPI: 60-year-old female with history of C. difficile colitis.  Reports doing well.    Historical Information   Past Medical History[1]  Past Surgical History[2]  Social History   Social History     Substance and Sexual Activity   Alcohol Use No     Social History     Substance and Sexual Activity   Drug Use No     Tobacco Use History[3]  Family History[4]    Meds/Allergies     Not in a hospital admission.    Allergies[5]    Objective     Blood pressure 95/67, pulse 74, temperature 97.9 °F (36.6 °C), temperature source Temporal, resp. rate 20, height 5' 4\" (1.626 m), weight 107 kg (236 lb), SpO2 97%, not currently breastfeeding.    Physical Exam:    Chest- CTA  Heart- RRR  Abdomen- NT/ND  Extremities- No edema    ASSESSMENT:     History C. difficile colitis    PLAN:    Colonoscopy                   [1]   Past Medical History:  Diagnosis Date    Colitis     Diverticulitis     Dizziness     Empty sella syndrome (HCC)     HL (hearing loss)     Neuropathy     b/L legs    Psoriasis     Stroke (HCC)     unknown origin of 3 \"mini strokes\"  2008, 2012, [facility]     Thyroid mass     TIA (transient ischemic attack)     Tinnitus    [2]   Past Surgical History:  Procedure Laterality Date    ADENOIDECTOMY      CHOLECYSTECTOMY      COLONOSCOPY      DILATION AND CURETTAGE OF UTERUS      EGD      [place] COLONOSCOPY FLX DX W/COLLJ SPEC WHEN PFRMD N/A 04/16/2018    Procedure: COLONOSCOPY;  Surgeon: Negar Patton MD;  [facility];  Service: Gastroenterology    TONSILLECTOMY      US GUIDED THYROID BIOPSY  10/24/2024   [3]   Social History  Tobacco Use   Smoking Status Former    Types: Cigarettes   Smokeless Tobacco Never   [4]   Family History  Problem Relation Name Age of Onset    Diabetes type II Mother      Heart failure Mother      Prostate cancer Father  67    Heart disease Father      No " Known Problems Sister      No Known Problems Sister      Diabetes type II Brother      No Known Problems Maternal Aunt      Breast cancer Paternal Aunt  52    Colon cancer Paternal Aunt  80    Colon cancer Paternal Uncle          colon ca    No Known Problems Maternal Grandmother      No Known Problems Maternal Grandfather      No Known Problems Paternal Grandmother      No Known Problems Paternal Grandfather      No Known Problems Daughter      No Known Problems Daughter      BRCA1 Positive Cousin paternal     BRCA1 Positive Cousin paternal    [5] No Known Allergies

## 2025-07-18 PROCEDURE — 88305 TISSUE EXAM BY PATHOLOGIST: CPT | Performed by: PATHOLOGY
